# Patient Record
Sex: FEMALE | Race: WHITE | NOT HISPANIC OR LATINO | Employment: PART TIME | URBAN - METROPOLITAN AREA
[De-identification: names, ages, dates, MRNs, and addresses within clinical notes are randomized per-mention and may not be internally consistent; named-entity substitution may affect disease eponyms.]

---

## 2017-02-01 ENCOUNTER — ALLSCRIPTS OFFICE VISIT (OUTPATIENT)
Dept: OTHER | Facility: OTHER | Age: 67
End: 2017-02-01

## 2017-07-11 ENCOUNTER — ALLSCRIPTS OFFICE VISIT (OUTPATIENT)
Dept: OTHER | Facility: OTHER | Age: 67
End: 2017-07-11

## 2017-07-11 DIAGNOSIS — M81.8 OTHER OSTEOPOROSIS WITHOUT CURRENT PATHOLOGICAL FRACTURE: ICD-10-CM

## 2017-07-11 DIAGNOSIS — Z13.820 ENCOUNTER FOR SCREENING FOR OSTEOPOROSIS: ICD-10-CM

## 2017-07-11 DIAGNOSIS — Z12.31 ENCOUNTER FOR SCREENING MAMMOGRAM FOR MALIGNANT NEOPLASM OF BREAST: ICD-10-CM

## 2017-07-12 ENCOUNTER — ALLSCRIPTS OFFICE VISIT (OUTPATIENT)
Dept: OTHER | Facility: OTHER | Age: 67
End: 2017-07-12

## 2017-07-15 LAB
ADDITIONAL INFORMATION (HISTORICAL): NORMAL
ADEQUACY: (HISTORICAL): NORMAL
COMMENT (HISTORICAL): NORMAL
CYTOTECHNOLOGIST: (HISTORICAL): NORMAL
INTERPRETATION (HISTORICAL): NORMAL
LMP (HISTORICAL): NORMAL
PREV. BX: (HISTORICAL): NORMAL
PREV. PAP (HISTORICAL): NORMAL
SOURCE (HISTORICAL): NORMAL

## 2017-07-18 ENCOUNTER — GENERIC CONVERSION - ENCOUNTER (OUTPATIENT)
Dept: OTHER | Facility: OTHER | Age: 67
End: 2017-07-18

## 2017-10-27 ENCOUNTER — ALLSCRIPTS OFFICE VISIT (OUTPATIENT)
Dept: OTHER | Facility: OTHER | Age: 67
End: 2017-10-27

## 2018-01-10 NOTE — MISCELLANEOUS
Message  The patient has called several times complaining of insomnia  Increasing trazodone to 200 mg at bedtime did not help   I instructed her to take 2 mg of lorazepam in addition to 200 mg of trazodone at bedtime until she sees Teresa NUNEZ NP      Signatures   Electronically signed by : Lane Burk MD; Feb 9 2016  6:04PM EST                       (Author)

## 2018-01-11 NOTE — PSYCH
Psych Med Mgmt    Appearance: was calm and cooperative and good eye contact  Observed mood: was dysphoric  Observed mood: affect was flat and affect was constricted  Speech: a normal rate  Thought processes: coherent/organized  Hallucinations: no hallucinations present  Thought Content: no delusions  Abnormal Thoughts: The patient has no suicidal thoughts and no homicidal thoughts  Orientation: The patient is oriented to person, place and time  Recent and Remote Memory: short term memory intact and long term memory intact  Attention Span And Concentration: concentration intact  Insight: Insight intact  Judgment: Her judgment was intact  Treatment Recommendations: Follow up in 3 months  Same meds  The patient has been filling controlled prescriptions on time as prescribed to [facility]       She reports normal appetite, normal energy level and no weight change  Mood is stable  Has settled into her residence much better  More social with the group  Recently had to give her dog up for adoption due to multiple medical issues and Elsa Chaidez could not afford to keep up with the expenses  She's handling the loss fairly well  It certainly has helped that she's become more social at her residence and has made some friends  Continue current meds  Vitals  Signs   Recorded: 69WWI6327 96:74GS   Systolic: 694  Diastolic: 88  Height: 5 ft 5 in  Weight: 170 lb   BMI Calculated: 28 29  BSA Calculated: 1 85    DSM    Provisional Diagnosis: Bipolar Depression no Psychosis  GAF--40  Assessment    1  Bipolar affective disorder, currently depressed, mild (296 51) (F31 31)    Plan    1  ARIPiprazole 5 MG Oral Tablet (Abilify); TAKE 1 TABLET DAILY   2  QUEtiapine Fumarate 100 MG Oral Tablet (SEROquel); take 2 tablets by mouth   at bedtime    Active Problems    1  Bipolar affective disorder, currently depressed, mild (296 51) (F31 31)   2  Cervical cancer screening (V76 2) (Z12 4)   3  Colon cancer screening (V76 51) (Z12 11)   4  Dietary calcium deficiency (269 3) (E58)   5  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 (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 09) (M81 8)   6  Encounter for gynecological examination without abnormal finding (V72 31) (Z01 419)   7  Encounter for screening for osteoporosis (V82 81) (Z13 820)   8  Inadequate exercise (V69 0) (Z72 3)   9  Osteoporosis in other diseases classified elsewhere (733 09) (M81 8)   10  Sebaceous cyst (706 2) (L72 3)   11  Visit for screening mammogram (V76 12) (Z12 31)    Past Medical History    1  Denied: History of abnormal cervical Pap smear   2  History of arthritis (V13 4) (Z87 39)   3  History of gastroesophageal reflux (GERD) (V12 79) (Z87 19)   4  Denied: History of herpes simplex infection   5  History of herpes zoster virus vaccination (V45 89) (Z92 29)   6  History of hypertension (V12 59) (Z86 79)   7  Denied: History of pregnancy   8  History of Insomnia, unspecified type (780 52) (G47 00)   9  History of Irritable bowel syndrome, unspecified type (564 1) (K58 9)   10  History of Migraine with aura and without status migrainosus, not intractable (346 00)    (G43 109)   11  History of Therapeutic opioid-induced constipation (OIC) (564 09,E935 2)    (K59 03,T40 2X5A)   12  History of Varicella without complication (951 0) (R12 8)    Allergies    1  Morphine Derivatives   2  Skelaxin TABS    Current Meds   1  ARIPiprazole 5 MG Oral Tablet; TAKE 1 TABLET DAILY; Therapy: 98MTF9188 to (Evaluate:03Oct2017)  Requested for: 87EQO3779; Last   Rx:05Vzd9317 Ordered   2  Azelastine HCl - 0 1 % Nasal Solution; Therapy: 53IBE9164 to Recorded   3  BuPROPion HCl ER (XL) 300 MG Oral Tablet Extended Release 24 Hour; Take 1 tablet   daily; Therapy: 66AGR5846 to (Evaluate:43Dfu8494)  Requested for: 48QSD4301; Last   Rx:38Phw5022 Ordered   4  Linzess 145 MCG Oral Capsule; Therapy: 24XZD8737 to Recorded   5   LORazepam 2 MG Oral Tablet; TAKE 1 TABLET 3 times daily; Therapy: 93IFQ1814 to (Evaluate:02May2017); Last Rx:52Emv1109 Ordered   6  Losartan Potassium 50 MG Oral Tablet; Therapy: 18VNK0335 to Recorded   7  Omeprazole 20 MG Oral Capsule Delayed Release; Therapy: 12Kgp7030 to Recorded   8  QUEtiapine Fumarate 100 MG Oral Tablet; take 2 tablets by mouth at bedtime; Therapy: 29ALA4435 to (Evaluate:34Yzp9183)  Requested for: 43EQN8148; Last   KX:06PCR5665 Ordered   9  SLPG Compound Medication; oxycodone 10/325 1 po daily; Therapy: (Recorded:66Fls2592) to Recorded    Family Psych History  Mother    1  Family history of breast cancer (V16 3) (Z80 3)  Father    2  Family history of liver disease (V18 59) (Z83 79)  Sister    3  Family history of depression (V17 0) (Z81 8)  Maternal Cousin    4  Family history of ovarian cancer (V16 41) (Z80 41)    Social History    · Alcohol use (V49 89) (Z78 9)   · Current every day smoker (305 1) (F17 200)   · Disabled   · Denied: History of Drug use   · Inadequate exercise (V69 0) (Z72 3)   · Occupation   · [employer]   · Single    End of Encounter Meds    1  ARIPiprazole 5 MG Oral Tablet (Abilify); TAKE 1 TABLET DAILY; Therapy: 68QXC4954 to (Evaluate:10Oct2017)  Requested for: 76Qon9646; Last   Rx:87Ftd0566 Ordered   2  BuPROPion HCl ER (XL) 300 MG Oral Tablet Extended Release 24 Hour (Wellbutrin XL); Take 1 tablet daily; Therapy: 12DBU7394 to (Evaluate:09Jul2017)  Requested for: 28GZU0171; Last   Rx:10May2017 Ordered   3  LORazepam 2 MG Oral Tablet; TAKE 1 TABLET 3 times daily; Therapy: 61DTF1411 to (Evaluate:02May2017); Last Rx:01Feb2017 Ordered   4  QUEtiapine Fumarate 100 MG Oral Tablet (SEROquel); take 2 tablets by mouth at   bedtime; Therapy: 85IEG6595 to (Evaluate:10Oct2017)  Requested for: 30Lcq6114; Last   Rx:10Jbk7575 Ordered    5  Azelastine HCl - 0 1 % Nasal Solution; Therapy: 11AUW1404 to Recorded   6  Linzess 145 MCG Oral Capsule;    Therapy: 56TVP1189 to Recorded   7  Losartan Potassium 50 MG Oral Tablet; Therapy: 98HGL9623 to Recorded   8  Omeprazole 20 MG Oral Capsule Delayed Release; Therapy: 73Wkp2351 to Recorded   9  SLPG Compound Medication; oxycodone 10/325 1 po daily;    Therapy: (Recorded:10Xqm9540) to Recorded    Signatures   Electronically signed by : Tray Swan; Jul 12 2017  3:32PM EST                       (Author)    Electronically signed by : Charles Mazariegos MD; Jul 12 2017  4:56PM EST

## 2018-01-12 NOTE — PSYCH
Psych Med Mgmt    Appearance: was calm and cooperative, demonstrated behavior psychomotor retardation and good eye contact  Observed mood: depressed and anxious  Observed mood: affect was constricted  Speech: a normal rate  Thought processes: flight of ideas  Hallucinations: no hallucinations present  Thought Content: no delusions  Abnormal Thoughts: The patient has no suicidal thoughts and no homicidal thoughts  Orientation: The patient is oriented to person, place and time  Recent and Remote Memory: short term memory intact and long term memory intact  Attention Span And Concentration: concentration impaired  Insight: Limited insight  Judgment: Her judgment was intact  Treatment Recommendations: Follow up in 4 weeks  Trial Abilify  The patient has been filling controlled prescriptions on time as prescribed to [facility]       She reports normal appetite, normal energy level and no weight change  Still experiencing mood fluctuations and anger over finances  Fights with sister frequently over $ from her trust fund  Discussed options, such as part-time employment  She will consider  No suicidal ideation  Vitals  Signs   Recorded: 56TYP9467 33:01WX   Systolic: 962  Diastolic: 80  Height: 5 ft 5 in  Weight: 160 lb   BMI Calculated: 26 63  BSA Calculated: 1 8    DSM    Provisional Diagnosis: Bipolar Depression    GAF--40  Assessment    1  Bipolar affective disorder, currently depressed, mild (296 51) (F31 31)    Plan    1  Doxepin HCl - 50 MG Oral Capsule   2  RisperiDONE 0 5 MG Oral Tablet (RisperDAL)   3  ARIPiprazole 5 MG Oral Tablet (Abilify); TAKE 1 TABLET DAILY   4  Zolpidem Tartrate ER 12 5 MG Oral Tablet Extended Release; TAKE 1 TABLET AT   BEDTIME   5  BuPROPion HCl ER (SR) 150 MG Oral Tablet Extended Release 12 Hour   (Wellbutrin SR); Take 1 tablet twice daily   6   LORazepam 2 MG Oral Tablet; TAKE 1 TABLET 3 times daily    Active Problems    1  Bipolar affective disorder, currently depressed, mild (296 51) (F31 31)    Past Medical History    1  History of depression (V11 8) (Z86 59)   2  History of gastroesophageal reflux (GERD) (V12 79) (Z87 19)   3  History of hypertension (V12 59) (Z86 79)   4  History of migraine (V12 49) (Z86 69)    Allergies    1  Morphine Derivatives   2  Skelaxin TABS    Current Meds   1  BuPROPion HCl ER (SR) 150 MG Oral Tablet Extended Release 12 Hour; Take 1 tablet   twice daily; Therapy: 11OUX9132 to (Evaluate:65Bhv9786)  Requested for: 21Bfa0639; Last   Rx:45Cfq2350 Ordered   2  Doxepin HCl - 50 MG Oral Capsule; TAKE 1 CAPSULE Bedtime May take 1-2 capsules   HS; Therapy: 22PMS0506 to (Evaluate:38Orp9687)  Requested for: 85MYJ2242; Last   Rx:16Nov2016 Ordered   3  LORazepam 2 MG Oral Tablet; TAKE 1 TABLET 3 times daily; Therapy: 83IRH6621 to (Evaluate:63Pbx9698); Last Rx:09Ziq2506 Ordered   4  RisperiDONE 0 5 MG Oral Tablet; Take 1 tablet daily; Therapy: 31DOT0213 to (Evaluate:27Nov2016); Last Rx:28Xez2232 Ordered    Family Psych History  Mother    1  Family history of malignant neoplasm of breast (V16 3) (Z80 3)    Social History    · Current every day smoker (305 1) (F17 200)   · Disabled   · Single    End of Encounter Meds    1  ARIPiprazole 5 MG Oral Tablet (Abilify); TAKE 1 TABLET DAILY; Therapy: 35XCG6659 to (Evaluate:88Kqz7828)  Requested for: 14GOM1147; Last   Rx:30Nov2016 Ordered   2  BuPROPion HCl ER (SR) 150 MG Oral Tablet Extended Release 12 Hour (Wellbutrin   SR); Take 1 tablet twice daily; Therapy: 64MUH3352 to (Melinda Blase)  Requested for: 16JQZ9348; Last   Rx:30Nov2016 Ordered   3  LORazepam 2 MG Oral Tablet; TAKE 1 TABLET 3 times daily; Therapy: 07AOI0956 to (Evaluate:23Amk3472); Last Rx:30Nov2016 Ordered   4  Zolpidem Tartrate ER 12 5 MG Oral Tablet Extended Release; TAKE 1 TABLET AT   BEDTIME; Therapy: 51IYK7743 to (Evaluate:82Kni8753);  Last Rx:71Wiy6581 Ordered    Signatures   Electronically signed by : Svitlana Scott; Nov 30 2016 10:33AM EST                       (Author)    Electronically signed by : Jaspreet Rashid MD; Nov 30 2016  4:56PM EST

## 2018-01-12 NOTE — PSYCH
Psych Med Mgmt    Appearance: was calm and cooperative and good eye contact  Observed mood: mood appropriate  Treatment Recommendations: Follow up in 3 months  Seroquel 100-200mg HS  The patient has been filling controlled prescriptions on time as prescribed to [facility]       She reports normal appetite, normal energy level and no weight change  Still not sleeping well  Mood is "about the same" Reports 3-4 hours of sleep at night  Years ago , had very good sleep, now not so much    Suggested Melatonin  Also, will trial Seroquel HS to help with sleep  Also asked her to discuss a sleep study with her PCP  Vitals  Signs   Recorded: 09BSL6735 36:52YJ   Systolic: 016  Diastolic: 60  Height: 5 ft 5 in  Weight: 155 lb   BMI Calculated: 25 79  BSA Calculated: 1 78    DSM    Provisional Diagnosis: Bipolar Depression no Psychosis    GAF--40  Assessment    1  Bipolar affective disorder, currently depressed, mild (296 51) (F31 31)    Plan    1  BuPROPion HCl ER (SR) 150 MG Oral Tablet Extended Release 12 Hour   (Wellbutrin SR)   2  Zolpidem Tartrate ER 12 5 MG Oral Tablet Extended Release   3  BuPROPion HCl ER (XL) 300 MG Oral Tablet Extended Release 24 Hour   (Wellbutrin XL); Take 1 tablet daily   4  QUEtiapine Fumarate 100 MG Oral Tablet (SEROquel); TAKE 2 TABLET Bedtime   5  ARIPiprazole 5 MG Oral Tablet (Abilify); TAKE 1 TABLET DAILY   6  LORazepam 2 MG Oral Tablet; TAKE 1 TABLET 3 times daily    Active Problems    1  Bipolar affective disorder, currently depressed, mild (296 51) (F31 31)   2  Sebaceous cyst (706 2) (L72 3)    Past Medical History    1  History of depression (V11 8) (Z86 59)   2  History of gastroesophageal reflux (GERD) (V12 79) (Z87 19)   3  History of hypertension (V12 59) (Z86 79)   4  History of migraine (V12 49) (Z86 69)    Allergies    1  Morphine Derivatives   2  Skelaxin TABS    Current Meds   1   ARIPiprazole 5 MG Oral Tablet; TAKE 1 TABLET DAILY; Therapy: 04DWW4755 to (Evaluate:28Feb2017)  Requested for: 06RTB4064; Last   Rx:30Nov2016 Ordered   2  BuPROPion HCl ER (SR) 150 MG Oral Tablet Extended Release 12 Hour; Take 1 tablet   twice daily; Therapy: 16SWS6475 to (Andriette Race)  Requested for: 38RQR5946; Last   Rx:30Nov2016 Ordered   3  LORazepam 2 MG Oral Tablet; TAKE 1 TABLET 3 times daily; Therapy: 39PFS6870 to (Evaluate:74Bzg4522); Last Rx:30Nov2016 Ordered   4  Zolpidem Tartrate ER 12 5 MG Oral Tablet Extended Release; TAKE 1 TABLET AT   BEDTIME; Therapy: 58YOX6668 to (Evaluate:28Feb2017); Last Rx:30Nov2016 Ordered    Family Psych History  Mother    1  Family history of malignant neoplasm of breast (V16 3) (Z80 3)  Father    2  Family history of liver disease (V18 59) (Z83 79)    Social History    · Current every day smoker (305 1) (F17 200)   · Disabled   · Single    End of Encounter Meds    1  ARIPiprazole 5 MG Oral Tablet (Abilify); TAKE 1 TABLET DAILY; Therapy: 05NJO0544 to (Robb Hsu)  Requested for: 22SUO2958; Last   Rx:01Feb2017; Status: ACTIVE - Transmit to Pharmacy - Awaiting Verification Ordered   2  BuPROPion HCl ER (XL) 300 MG Oral Tablet Extended Release 24 Hour (Wellbutrin XL); Take 1 tablet daily; Therapy: 54GDN1850 to (Robb Hsu)  Requested for: 32AOF8092; Last   Rx:01Feb2017; Status: ACTIVE - Transmit to Pharmacy - Awaiting Verification Ordered   3  LORazepam 2 MG Oral Tablet; TAKE 1 TABLET 3 times daily; Therapy: 81GYC7518 to (Evaluate:70Nci7171); Last Rx:01Feb2017 Ordered   4  QUEtiapine Fumarate 100 MG Oral Tablet (SEROquel); TAKE 2 TABLET Bedtime;    Therapy: 46LUI3481 to (Robb Hsu)  Requested for: 51TJZ9916; Last   Rx:01Feb2017; Status: ACTIVE - Transmit to [facility] Verification Ordered    Signatures   Electronically signed by : Jorge Lopez; Feb  1 2017  2:37PM EST                       (Author)    Electronically signed by : Tanvir Harrington Kevin Leary MD; Feb 1 2017  5:07PM EST

## 2018-01-12 NOTE — PSYCH
Assessment    1  Bipolar affective disorder, currently depressed, mild (296 51) (F31 31)    Plan    1  ARIPiprazole 5 MG Oral Tablet (Abilify); TAKE 1 TABLET DAILY   2  BuPROPion HCl ER (XL) 300 MG Oral Tablet Extended Release 24 Hour   (Wellbutrin XL); Take 1 tablet daily   3  LORazepam 2 MG Oral Tablet; TAKE 0 5 TABLET 3 TIMES DAILY   4  TraZODone HCl - 100 MG Oral Tablet; TAKE 1 TABLET AT BEDTIME    Chief Complaint  Referred by Dr Isabelle Diaz  Here to evaluate current meds  History of Present Illness  60year old female here for med evaluation  Referred by Dr Isabelle Diaz  Has been diagnosed with Bipolar Disorder, but does not agree with diagnosis  Believes she is depressed not saldivar  Reports episodes of depression, feels sad and cries at times  No suicidal thoughts  Has been on multple psych meds and may need some adjustments  Has some mild paranoia  Lives in [address] fixed income apartment home in [facility]  Has been living here for one year  was living in a townhouse for 4 years but lost the home secondary to spending all her $ which was her inheritence  Now she has a rep payee  No suicidal thoughts  Past Psychiatric History    Past Psychiatric History: Never hospitalized psychiatrically  Has seen about 4-5 psychiatrists in past     Current meds:  Wellbutrin XL 450mg Daily  Ativan 2mg Daily  Pamelor 50mg PM  Ambien CR12 5mg Daily  Risperdal 2mg HS  Substance Abuse Hx    Substance Abuse History: None  Past Medical History    1  History of depression (V11 8) (Z86 59)   2  History of gastroesophageal reflux (GERD) (V12 79) (Z87 19)   3  History of hypertension (V12 59) (Z86 79)   4  History of migraine (V12 49) (Z86 69)    Allergies    1  Morphine Derivatives   2  Skelaxin TABS    Family Psych History    1   Family history of malignant neoplasm of breast (V16 3) (Z80 3)    Social History    · Current every day smoker (305 1) (F17 200)   · Disabled   · Single    History Of Phys/Sex Abuse Or Perpetration    History Of Phys/Sex Abuse or Perpetration: None  Vitals  Signs [Data Includes: Current Encounter]   Recorded: 70LPB6981 00:94LV   Systolic: 859  Diastolic: 80  Height: 5 ft 5 in  Weight: 178 lb   BMI Calculated: 29 62  BSA Calculated: 1 88    Physical Exam    Treatment Recommendations: Follow up in 5-6 weeks  DSM    Provisional Diagnosis: Bipolar Depression with Psychosis    GAF--40  End of Encounter Meds    1  ARIPiprazole 5 MG Oral Tablet (Abilify); TAKE 1 TABLET DAILY; Therapy: 61PJX4295 to (Aguila Mayo)  Requested for: 61DTA5588; Last   Rx:29Jan2016 Ordered   2  BuPROPion HCl ER (XL) 300 MG Oral Tablet Extended Release 24 Hour (Wellbutrin XL); Take 1 tablet daily; Therapy: 98KHH0219 to (Aguila Mayo)  Requested for: 26KVA5969; Last   Rx:29Jan2016 Ordered   3  LORazepam 2 MG Oral Tablet; TAKE 0 5 TABLET 3 TIMES DAILY; Therapy: 98ZDG3275 to (Evaluate:28Apr2016); Last Rx:29Jan2016 Ordered   4  TraZODone HCl - 100 MG Oral Tablet; TAKE 1 TABLET AT BEDTIME;    Therapy: 07HGK4639 to (Aguila Mayo)  Requested for: 12WGR8821; Last   MJ:59QOI0999 Ordered    Signatures   Electronically signed by : Cruz Miller; Jan 29 2016  3:28PM EST                       (Author)    Electronically signed by : Cruz Miller; Jan 29 2016  3:46PM EST                       (Author)    Electronically signed by : Elissa Brothers MD; Feb 1 2016  3:33PM EST

## 2018-01-13 VITALS
HEIGHT: 65 IN | BODY MASS INDEX: 28.32 KG/M2 | SYSTOLIC BLOOD PRESSURE: 142 MMHG | WEIGHT: 170 LBS | DIASTOLIC BLOOD PRESSURE: 90 MMHG

## 2018-01-14 VITALS
DIASTOLIC BLOOD PRESSURE: 60 MMHG | BODY MASS INDEX: 25.83 KG/M2 | WEIGHT: 155 LBS | HEIGHT: 65 IN | SYSTOLIC BLOOD PRESSURE: 120 MMHG

## 2018-01-14 VITALS
DIASTOLIC BLOOD PRESSURE: 88 MMHG | BODY MASS INDEX: 28.32 KG/M2 | WEIGHT: 170 LBS | SYSTOLIC BLOOD PRESSURE: 140 MMHG | HEIGHT: 65 IN

## 2018-01-15 NOTE — RESULT NOTES
Verified Results  (Q) THINPREP TIS PAP RFX HPV 47JSL9154 12:00AM Apryl Mayen     Test Name Result Flag Reference   CLINICAL INFORMATION:      NO H/O ABN PAPA 72 Y/O G0   LMP:      NONE GIVEN   PREV  PAP:      NONE GIVEN   PREV  BX:      NONE GIVEN   SOURCE:      Cervix, Endocervix   STATEMENT OF ADEQUACY:      Satisfactory for evaluation  Endocervical/transformation zone component  present  INTERPRETATION/RESULT:      Negative for intraepithelial lesion or malignancy  COMMENT:      This Pap test has been evaluated with computer  assisted technology     CYTOTECHNOLOGIST:      OLEG LEROY(ASCP)  CT screening location: [facility], [address] [place] [address]

## 2018-01-15 NOTE — MISCELLANEOUS
Message   Date: 21 Dec 2016 3:59 PM EST, Recorded By: Gracie Cosme For: [facility],Team   Called pt with results from mammogram done on 10/13/16  Negative results, f/u mammo in 1 yr  Pt already has mammo scheduled  Active Problems    1  Bipolar affective disorder, currently depressed, mild (296 51) (F31 31)   2  Sebaceous cyst (706 2) (L72 3)    Current Meds   1  ARIPiprazole 5 MG Oral Tablet (Abilify); TAKE 1 TABLET DAILY; Therapy: 39WOK2547 to (Evaluate:79Xcu5869)  Requested for: 01ILZ0779; Last   Rx:30Nov2016 Ordered   2  BuPROPion HCl ER (SR) 150 MG Oral Tablet Extended Release 12 Hour (Wellbutrin   SR); Take 1 tablet twice daily; Therapy: 32ESM2569 to (Janet Namira)  Requested for: 65YOB0639; Last   Rx:30Nov2016 Ordered   3  LORazepam 2 MG Oral Tablet; TAKE 1 TABLET 3 times daily; Therapy: 07OYI9640 to (Evaluate:45Mfu7766); Last Rx:30Nov2016 Ordered   4  Zolpidem Tartrate ER 12 5 MG Oral Tablet Extended Release; TAKE 1 TABLET AT   BEDTIME; Therapy: 17JFK4947 to (Evaluate:15Igs9996); Last Rx:30Nov2016 Ordered    Allergies    1  Morphine Derivatives   2   Skelaxin TABS    Signatures   Electronically signed by : Lashanda Restrepo, ; Dec 21 2016  4:01PM EST                       (Author)

## 2018-01-16 NOTE — PSYCH
Psych Med Mgmt    Appearance: was calm and cooperative and good eye contact  Observed mood: was dysphoric  Observed mood: affect was blunted  Speech: slowed  Thought processes: circumstantial and poverty of thought was observed  Hallucinations: no hallucinations present  Thought Content: no delusions  Abnormal Thoughts: The patient has no suicidal thoughts and no homicidal thoughts  Orientation: The patient is oriented to person, place and time  Recent and Remote Memory: short term memory intact and long term memory intact  Attention Span And Concentration: concentration impaired  Insight: Insight intact  Judgment: Her judgment was intact  Treatment Recommendations: Follow up in 8 weeks  She reports normal appetite, normal energy level and no weight change  Mood is very subdued  Lethargic at times  Certainly not as grandiose as she has been on initial visit  She also is complaining of some stomach distress, will adjust meds and monitor  No suicidal ideation  No psychosis  Vitals  Signs [Data Includes: Current Encounter]   Recorded: 17NXH0050 59:69SE   Systolic: 367  Diastolic: 80  Height: 5 ft 5 in  Weight: 160 lb   BMI Calculated: 26 63  BSA Calculated: 1 8    DSM    Provisional Diagnosis: Bipolar Disorder with Psychosis  GAF--40  Assessment    1  Bipolar affective disorder, currently depressed, mild (296 51) (F31 31)    Plan    1  ARIPiprazole 5 MG Oral Tablet (Abilify)   2  BuPROPion HCl ER (XL) 300 MG Oral Tablet Extended Release 24 Hour   (Wellbutrin XL)   3  ARIPiprazole 2 MG Oral Tablet (Abilify); TAKE 1 TABLET DAILY   4  BuPROPion HCl ER (SR) 150 MG Oral Tablet Extended Release 12 Hour   (Wellbutrin SR); Take 1 tablet twice daily   5  LORazepam 2 MG Oral Tablet; TAKE 0 5 TABLET 3 TIMES DAILY   6  TraZODone HCl - 50 MG Oral Tablet; TAKE 4 TABLET Bedtime    Active Problems    1   Bipolar affective disorder, currently depressed, mild (296 51) (F31 31)    Past Medical History    1  History of depression (V11 8) (Z86 59)   2  History of gastroesophageal reflux (GERD) (V12 79) (Z87 19)   3  History of hypertension (V12 59) (Z86 79)   4  History of migraine (V12 49) (Z86 69)    Allergies    1  Morphine Derivatives   2  Skelaxin TABS    Current Meds   1  ARIPiprazole 5 MG Oral Tablet; TAKE 1 TABLET DAILY; Therapy: 76QPW9633 to (Lowell Aponte)  Requested for: 63BBK7344; Last   Rx:29Jan2016 Ordered   2  BuPROPion HCl ER (XL) 300 MG Oral Tablet Extended Release 24 Hour; Take 1 tablet   daily; Therapy: 07EIH4710 to (Lowell Aponte)  Requested for: 66JMI0499; Last   Rx:29Jan2016 Ordered   3  LORazepam 2 MG Oral Tablet; TAKE 0 5 TABLET 3 TIMES DAILY; Therapy: 16WAP9599 to (Evaluate:28Apr2016); Last Rx:29Jan2016 Ordered   4  TraZODone HCl - 100 MG Oral Tablet; TAKE 1 TABLET AT BEDTIME; Therapy: 66ODQ7275 to (Lowell Aponte)  Requested for: 79QTK2208; Last   Rx:29Jan2016 Ordered    Family Psych History    1  Family history of malignant neoplasm of breast (V16 3) (Z80 3)    Social History    · Current every day smoker (305 1) (F17 200)   · Disabled   · Single    End of Encounter Meds    1  ARIPiprazole 2 MG Oral Tablet (Abilify); TAKE 1 TABLET DAILY; Therapy: 03WDQ3902 to (Evaluate:23Jun2016)  Requested for: 25Mar2016; Last   Rx:25Mar2016 Ordered   2  BuPROPion HCl ER (SR) 150 MG Oral Tablet Extended Release 12 Hour (Wellbutrin   SR); Take 1 tablet twice daily; Therapy: 46VQP4104 to (Evaluate:23Jun2016)  Requested for: 25Mar2016; Last   Rx:25Mar2016 Ordered   3  LORazepam 2 MG Oral Tablet; TAKE 0 5 TABLET 3 TIMES DAILY; Therapy: 64OOO5441 to (Evaluate:23Jun2016); Last Rx:25Mar2016 Ordered   4  TraZODone HCl - 50 MG Oral Tablet; TAKE 4 TABLET Bedtime;    Therapy: 57ZGS7997 to (Evaluate:23Jun2016)  Requested for: 51OPP1483; Last   Rx:25Mar2016 Ordered    Signatures   Electronically signed by : Cortez Hanley; Mar 25 2016  4:05PM EST (Author)    Electronically signed by : Marcelo Garcia MD; Mar 28 2016  4:51PM EST

## 2018-01-17 NOTE — PSYCH
Psych Med Mgmt    Appearance: was calm and cooperative and good eye contact  Observed mood: was dysphoric and anxious  Observed mood: affect was blunted  Speech: a normal rate  Thought processes: tangential  and circumstantial    Hallucinations: no hallucinations present  Thought Content: no delusions  Abnormal Thoughts: The patient has no suicidal thoughts and no homicidal thoughts  Orientation: The patient is oriented to person, place and time  Recent and Remote Memory: short term memory intact and long term memory intact  Attention Span And Concentration: concentration impaired  Insight: Limited insight  Judgment: Her judgment was limited  Treatment Recommendations: Follow up in 8-10 weeks    Will increase Abilify  She reports normal appetite, normal energy level and no weight change  Mood is labile, irritable  Fouund out her trust allottment will be decrease due to "not much money left"  This has her upset  Discussed ways to cope with less $ and still be able to enjoy things  Will also increase the Abilify to decrease her mood dysfunction  No suicidal thoughts  Vitals  Signs [Data Includes: Current Encounter]   Recorded: 41AXR8518 10:38AR   Systolic: 796  Diastolic: 80  Height: 5 ft 5 in  Weight: 160 lb   BMI Calculated: 26 63  BSA Calculated: 1 8    DSM    Provisional Diagnosis: Bipolar Depression    GAF--40  Assessment    1  Bipolar affective disorder, currently depressed, mild (296 51) (F31 31)    Plan    1  ARIPiprazole 2 MG Oral Tablet (Abilify)   2  ARIPiprazole 5 MG Oral Tablet (Abilify); TAKE 1 TABLET DAILY   3  From  LORazepam 2 MG Oral Tablet TAKE 0 5 TABLET 3 TIMES DAILY To   LORazepam 2 MG Oral Tablet TAKE 1 TABLET 3 times daily   4  BuPROPion HCl ER (SR) 150 MG Oral Tablet Extended Release 12 Hour   (Wellbutrin SR); Take 1 tablet twice daily   5  TraZODone HCl - 50 MG Oral Tablet; TAKE 4 TABLET Bedtime    Active Problems    1   Bipolar affective disorder, currently depressed, mild (296 51) (F31 31)    Past Medical History    1  History of depression (V11 8) (Z86 59)   2  History of gastroesophageal reflux (GERD) (V12 79) (Z87 19)   3  History of hypertension (V12 59) (Z86 79)   4  History of migraine (V12 49) (Z86 69)    Allergies    1  Morphine Derivatives   2  Skelaxin TABS    Current Meds   1  ARIPiprazole 2 MG Oral Tablet; TAKE 1 TABLET DAILY; Therapy: 09ECZ0702 to (Evaluate:23Jun2016)  Requested for: 25Mar2016; Last   Rx:25Mar2016 Ordered   2  BuPROPion HCl ER (SR) 150 MG Oral Tablet Extended Release 12 Hour; Take 1 tablet   twice daily; Therapy: 18GXE1581 to (Evaluate:23Jun2016)  Requested for: 25Mar2016; Last   Rx:25Mar2016 Ordered   3  LORazepam 2 MG Oral Tablet; TAKE 0 5 TABLET 3 TIMES DAILY; Therapy: 80SBS7557 to (Evaluate:23Jun2016); Last Rx:25Mar2016 Ordered   4  TraZODone HCl - 50 MG Oral Tablet; TAKE 4 TABLET Bedtime; Therapy: 47TXD2687 to (Evaluate:23Jun2016)  Requested for: 25Mar2016; Last   Rx:25Mar2016 Ordered    Family Psych History  Mother    1  Family history of malignant neoplasm of breast (V16 3) (Z80 3)    Social History    · Current every day smoker (305 1) (F17 200)   · Disabled   · Single    End of Encounter Meds    1  ARIPiprazole 5 MG Oral Tablet (Abilify); TAKE 1 TABLET DAILY; Therapy: 33JNS1708 to (Evaluate:04Poz7672)  Requested for: 87HFN6198; Last   Rx:32Akd0827 Ordered   2  BuPROPion HCl ER (SR) 150 MG Oral Tablet Extended Release 12 Hour (Wellbutrin   SR); Take 1 tablet twice daily; Therapy: 68BQK1094 to (Evaluate:91Jip0578)  Requested for: 88CGC2289; Last   Rx:43Xie7115 Ordered   3  LORazepam 2 MG Oral Tablet; TAKE 1 TABLET 3 times daily; Therapy: 74CRQ5423 to (Evaluate:49Gsa8486); Last NJ:08QLA5098 Ordered   4  TraZODone HCl - 50 MG Oral Tablet; TAKE 4 TABLET Bedtime;    Therapy: 43CEW9090 to (Evaluate:40Bms5336)  Requested for: 78DLJ1507; Last   FQ:14SRM8854 Ordered    Signatures   Electronically signed by MARSHAL Sanders; May 20 2016  3:23PM EST                       (Author)    Electronically signed by : Mayra Delaney MD; May 31 2016  3:34PM EST

## 2018-01-17 NOTE — PSYCH
Psych Med Mgmt    Appearance: was calm and cooperative and good eye contact  Observed mood: mood appropriate  Observed mood: affect was constricted, but affect appropriate  Speech: a normal rate  Thought processes: coherent/organized  Hallucinations: no hallucinations present  Thought Content: no delusions  Abnormal Thoughts: The patient has no suicidal thoughts and no homicidal thoughts  Orientation: The patient is oriented to person, place and time  Recent and Remote Memory: short term memory intact and long term memory intact  Attention Span And Concentration: concentration intact  Insight: Insight intact  Judgment: Her judgment was intact  Treatment Recommendations: Follow up in 6 months  Same meds  The patient has been filling controlled prescriptions on time as prescribed to [facility]       She reports normal appetite, normal energy level and no weight change  Mood is better  Getting along well with people in her place of residence  This has helped her to become more social, more engaging with others  Meds working well to keep symptoms under control  Denies anxiety or depression  Continue current treatment  Vitals  Signs   Recorded: 43EZB6244 57:69ZM   Systolic: 800  Diastolic: 86  Height: 5 ft 5 in  Weight: 170 lb   BMI Calculated: 28 29  BSA Calculated: 1 85    DSM    Provisional Diagnosis: Bipolar Depression no Psychosis  GAF--40    Assessment    1  Bipolar affective disorder, currently depressed, mild (296 51) (F31 31)    Plan    1  ARIPiprazole 5 MG Oral Tablet (Abilify); TAKE 1 TABLET DAILY   2  BuPROPion HCl ER (XL) 300 MG Oral Tablet Extended Release 24 Hour   (Wellbutrin XL); Take 1 tablet daily   3  LORazepam 2 MG Oral Tablet; TAKE 1 TABLET 3 times daily   4  QUEtiapine Fumarate 100 MG Oral Tablet (SEROquel); take 2 tablets by mouth   at bedtime    Active Problems    1   Bipolar affective disorder, currently depressed, mild (296 51) (F31 31)   2  Cervical cancer screening (V76 2) (Z12 4)   3  Colon cancer screening (V76 51) (Z12 11)   4  Dietary calcium deficiency (269 3) (E58)   5  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 (733 09,E947 9)   (M81 8,T50 905A)   6  Encounter for gynecological examination without abnormal finding (V72 31) (Z01 419)   7  Encounter for screening for osteoporosis (V82 81) (Z13 820)   8  Inadequate exercise (V69 0) (Z72 3)   9  Osteoporosis in other diseases classified elsewhere (733 09) (M81 8)   10  Sebaceous cyst (706 2) (L72 3)   11  Visit for screening mammogram (V76 12) (Z12 31)    Past Medical History    1  Denied: History of abnormal cervical Pap smear   2  History of arthritis (V13 4) (Z87 39)   3  History of gastroesophageal reflux (GERD) (V12 79) (Z87 19)   4  Denied: History of herpes simplex infection   5  History of herpes zoster virus vaccination (V45 89) (Z92 29)   6  History of hypertension (V12 59) (Z86 79)   7  Denied: History of pregnancy   8  History of Insomnia, unspecified type (780 52) (G47 00)   9  History of Irritable bowel syndrome, unspecified type (564 1) (K58 9)   10  History of Migraine with aura and without status migrainosus, not intractable (346 00)    (G43 109)   11  History of Therapeutic opioid-induced constipation (OIC) (564 09,E935 2)    (K59 03,T40 2X5A)   12  History of Varicella without complication (898 5) (Z62 8)    Allergies    1  Morphine Derivatives   2  Skelaxin TABS    Current Meds   1  ARIPiprazole 5 MG Oral Tablet; TAKE 1 TABLET DAILY; Therapy: 01NYH2761 to (Evaluate:10Oct2017)  Requested for: 48Qbi6454; Last   Rx:85Gic4450 Ordered   2  Azelastine HCl - 0 1 % Nasal Solution; Therapy: 13GYW9931 to Recorded   3  BuPROPion HCl ER (XL) 300 MG Oral Tablet Extended Release 24 Hour; Take 1 tablet   daily; Therapy: 27ZVQ1057 to (Evaluate:33Qcz6930)  Requested for: 99Yti9731; Last   Rx:86Ftg7612 Ordered   4   Linzess 145 MCG Oral Capsule; Therapy: 65GFE1361 to Recorded   5  LORazepam 2 MG Oral Tablet; TAKE 1 TABLET 3 times daily; Therapy: 00SEP5472 to (Evaluate:32Xuo9178); Last Rx:24Kah4903 Ordered   6  Losartan Potassium 50 MG Oral Tablet; Therapy: 99JRR8615 to Recorded   7  Omeprazole 20 MG Oral Capsule Delayed Release; Therapy: 04Fbo2366 to Recorded   8  QUEtiapine Fumarate 100 MG Oral Tablet; take 2 tablets by mouth at bedtime; Therapy: 24BHX3286 to (Evaluate:05Eth8478)  Requested for: 09Aug2017; Last   Rx:26Dzb3455 Ordered   9  SLPG Compound Medication; oxycodone 10/325 1 po daily; Therapy: (Recorded:37Ehq6651) to Recorded    Family Psych History  Mother    1  Family history of breast cancer (V16 3) (Z80 3)  Father    2  Family history of liver disease (V18 59) (Z83 79)  Sister    3  Family history of depression (V17 0) (Z81 8)  Maternal Cousin    4  Family history of ovarian cancer (V16 41) (Z80 41)    Social History    · Alcohol use (V49 89) (Z78 9)   · Current every day smoker (305 1) (F17 200)   · Disabled   · Denied: History of Drug use   · Inadequate exercise (V69 0) (Z72 3)   · Occupation   · [employer]   · Single    End of Encounter Meds    1  ARIPiprazole 5 MG Oral Tablet (Abilify); TAKE 1 TABLET DAILY; Therapy: 13MPL3286 to (Evaluate:25Apr2018)  Requested for: 27Oct2017; Last   Rx:27Oct2017 Ordered   2  BuPROPion HCl ER (XL) 300 MG Oral Tablet Extended Release 24 Hour (Wellbutrin XL); Take 1 tablet daily; Therapy: 22FFM5228 to (Evaluate:46Kth2923)  Requested for: 27Oct2017; Last   Rx:27Oct2017 Ordered   3  LORazepam 2 MG Oral Tablet; TAKE 1 TABLET 3 times daily; Therapy: 23XLH8062 to 96 072624); Last Rx:27Oct2017 Ordered   4  QUEtiapine Fumarate 100 MG Oral Tablet (SEROquel); take 2 tablets by mouth at   bedtime; Therapy: 36KJU2361 to (Evaluate:28Ogf8438)  Requested for: 27Oct2017; Last   Rx:27Oct2017 Ordered    5  Azelastine HCl - 0 1 % Nasal Solution;    Therapy: 32NTD6271 to Recorded   6  Linzess 145 MCG Oral Capsule; Therapy: 78OFY5825 to Recorded   7  Losartan Potassium 50 MG Oral Tablet; Therapy: 85JJT6003 to Recorded   8  Omeprazole 20 MG Oral Capsule Delayed Release; Therapy: 81Zfx3916 to Recorded   9  SLPG Compound Medication; oxycodone 10/325 1 po daily;    Therapy: (Recorded:41Drr6820) to Recorded    Signatures   Electronically signed by : Eliud Bird; Oct 27 2017  1:51PM EST                       (Author)    Electronically signed by : Neva Johnson MD; Oct 27 2017  2:14PM EST

## 2018-01-17 NOTE — PSYCH
Psych Med Mgmt    Appearance: was calm and cooperative and good eye contact  Observed mood: was dysphoric and anxious  Observed mood: affect was flat  Speech: a normal rate  Thought processes: coherent/organized  Hallucinations: no hallucinations present  Thought Content: no delusions  Abnormal Thoughts: The patient has no suicidal thoughts and no homicidal thoughts  Orientation: The patient is oriented to person, place and time  Recent and Remote Memory: short term memory intact and long term memory intact  Attention Span And Concentration: concentration intact  Insight: Limited insight  Judgment: Her judgment was limited  Treatment Recommendations: Folow up in 3 months  D/C Abilify secondary to cost issues  Trial Risperdal 2 mg HS  She reports normal appetite, normal energy level and no weight change  Recently had an incident at [facility] where she did not have enough o pay for groceries  Other customers helped her out, and she was able to get her groceries  She did manage her emotions well through this incident  Less labile,less irritable  Still with financial issues  Easily overwhelmed  No suicidal ideation  Worried about $  Wants Abilify changed to a more cost effective med  History of being on Risperdal, will retry  Vitals  Signs   Recorded: 83WFA2576 39:03DS   Systolic: 287  Diastolic: 80  Height: 5 ft 5 in  Weight: 160 lb   BMI Calculated: 26 63  BSA Calculated: 1 8    DSM    Provisional Diagnosis: Bipolar Depression-Recurrent    GAF--40  Assessment    1  Bipolar affective disorder, currently depressed, mild (296 51) (F31 31)    Plan    1  RisperiDONE 2 MG Oral Tablet (RisperDAL); TAKE 1 TABLET AT BEDTIME   2  BuPROPion HCl ER (SR) 150 MG Oral Tablet Extended Release 12 Hour   (Wellbutrin SR); Take 1 tablet twice daily   3  LORazepam 2 MG Oral Tablet; TAKE 1 TABLET 3 times daily   4   TraZODone HCl - 50 MG Oral Tablet; TAKE 4 TABLET Bedtime    Active Problems    1  Bipolar affective disorder, currently depressed, mild (296 51) (F31 31)    Past Medical History    1  History of depression (V11 8) (Z86 59)   2  History of gastroesophageal reflux (GERD) (V12 79) (Z87 19)   3  History of hypertension (V12 59) (Z86 79)   4  History of migraine (V12 49) (Z86 69)    Allergies    1  Morphine Derivatives   2  Skelaxin TABS    Current Meds   1  BuPROPion HCl ER (SR) 150 MG Oral Tablet Extended Release 12 Hour; Take 1 tablet   twice daily; Therapy: 86PQE5059 to (Evaluate:80Ogq7239)  Requested for: 21XMP9178; Last   Rx:93Qkq9302 Ordered   2  LORazepam 2 MG Oral Tablet; TAKE 1 TABLET 3 times daily; Therapy: 56FFY1822 to (Evaluate:86Zll8194); Last DP:41KGJ4422 Ordered   3  TraZODone HCl - 50 MG Oral Tablet; TAKE 4 TABLET Bedtime; Therapy: 17SLL4792 to (Evaluate:35Bnv6094)  Requested for: 73DMO2797; Last   Rx:11Ama1606 Ordered    Family Psych History  Mother    1  Family history of malignant neoplasm of breast (V16 3) (Z80 3)    Social History    · Current every day smoker (305 1) (F17 200)   · Disabled   · Single    End of Encounter Meds    1  BuPROPion HCl ER (SR) 150 MG Oral Tablet Extended Release 12 Hour (Wellbutrin   SR); Take 1 tablet twice daily; Therapy: 64OLN6904 to (Evaluate:79Vfy6835)  Requested for: 90Akv5464; Last   Rx:52Zoc2917 Ordered   2  LORazepam 2 MG Oral Tablet; TAKE 1 TABLET 3 times daily; Therapy: 71BSA2653 to (Evaluate:31Nwa2241); Last Rx:24Nxb8042 Ordered   3  RisperiDONE 2 MG Oral Tablet (RisperDAL); TAKE 1 TABLET AT BEDTIME; Therapy: 06Sos3726 to (Bharat Chime)  Requested for: 86Jvk9432; Last   Rx:67Grp8930 Ordered   4  TraZODone HCl - 50 MG Oral Tablet; TAKE 4 TABLET Bedtime;    Therapy: 72YDV0918 to (Evaluate:34Bqc2529)  Requested for: 50Yfu5630; Last   II:71TAX6298 Ordered    Signatures   Electronically signed by : Ronit Hernandez; Sep  7 2016  3:25PM EST                       (Author)    Electronically signed by : Phan Jarquin Kwaku Connors MD; Sep  7 2016  4:53PM EST

## 2018-01-22 VITALS
BODY MASS INDEX: 28.32 KG/M2 | DIASTOLIC BLOOD PRESSURE: 86 MMHG | HEIGHT: 65 IN | SYSTOLIC BLOOD PRESSURE: 140 MMHG | WEIGHT: 170 LBS

## 2018-03-23 ENCOUNTER — TELEPHONE (OUTPATIENT)
Dept: PSYCHIATRY | Facility: CLINIC | Age: 68
End: 2018-03-23

## 2018-04-15 DIAGNOSIS — F33.0 MILD EPISODE OF RECURRENT MAJOR DEPRESSIVE DISORDER (HCC): Primary | ICD-10-CM

## 2018-04-16 ENCOUNTER — TELEPHONE (OUTPATIENT)
Dept: PSYCHIATRY | Facility: CLINIC | Age: 68
End: 2018-04-16

## 2018-04-18 RX ORDER — BUPROPION HYDROCHLORIDE 300 MG/1
TABLET ORAL
Qty: 30 TABLET | Refills: 2 | Status: SHIPPED | OUTPATIENT
Start: 2018-04-18 | End: 2018-04-25 | Stop reason: ALTCHOICE

## 2018-04-25 ENCOUNTER — OFFICE VISIT (OUTPATIENT)
Dept: PSYCHIATRY | Facility: CLINIC | Age: 68
End: 2018-04-25
Payer: MEDICARE

## 2018-04-25 DIAGNOSIS — F31.77 BIPOLAR DISORDER, IN PARTIAL REMISSION, MOST RECENT EPISODE MIXED (HCC): ICD-10-CM

## 2018-04-25 DIAGNOSIS — F33.0 MILD EPISODE OF RECURRENT MAJOR DEPRESSIVE DISORDER (HCC): Primary | ICD-10-CM

## 2018-04-25 PROBLEM — F33.41 RECURRENT MAJOR DEPRESSIVE DISORDER, IN PARTIAL REMISSION (HCC): Status: ACTIVE | Noted: 2018-04-25

## 2018-04-25 PROCEDURE — 99213 OFFICE O/P EST LOW 20 MIN: CPT | Performed by: PSYCHIATRY & NEUROLOGY

## 2018-04-25 RX ORDER — QUETIAPINE FUMARATE 100 MG/1
2 TABLET, FILM COATED ORAL
COMMUNITY
Start: 2017-02-01 | End: 2018-07-25 | Stop reason: SDUPTHER

## 2018-04-25 RX ORDER — ARIPIPRAZOLE 5 MG/1
1 TABLET ORAL DAILY
COMMUNITY
Start: 2016-11-30 | End: 2018-04-25 | Stop reason: SDUPTHER

## 2018-04-25 RX ORDER — ARIPIPRAZOLE 5 MG/1
5 TABLET ORAL DAILY
Qty: 30 TABLET | Refills: 5 | Status: SHIPPED | OUTPATIENT
Start: 2018-04-25 | End: 2018-07-25 | Stop reason: SDUPTHER

## 2018-04-25 RX ORDER — BUPROPION HYDROCHLORIDE 300 MG/1
150 TABLET ORAL DAILY
COMMUNITY
Start: 2018-03-16 | End: 2018-04-25 | Stop reason: SDUPTHER

## 2018-04-25 RX ORDER — LORAZEPAM 2 MG/1
2 TABLET ORAL 3 TIMES DAILY
Qty: 90 TABLET | Refills: 2 | Status: SHIPPED | OUTPATIENT
Start: 2018-04-25 | End: 2018-07-25 | Stop reason: SDUPTHER

## 2018-04-25 RX ORDER — LORAZEPAM 2 MG/1
1 TABLET ORAL 3 TIMES DAILY
COMMUNITY
Start: 2016-01-29 | End: 2018-04-25 | Stop reason: SDUPTHER

## 2018-04-25 RX ORDER — BUPROPION HYDROCHLORIDE 300 MG/1
300 TABLET ORAL DAILY
Qty: 30 TABLET | Refills: 5 | Status: SHIPPED | OUTPATIENT
Start: 2018-04-25 | End: 2018-07-25 | Stop reason: SDUPTHER

## 2018-04-25 NOTE — PSYCH
PROGRESS NOTE        [facility]      Name and Date of Birth:  Adan Bauman 78 y o  1950    Date of Visit: 04/25/18    SUBJECTIVE:    Kendra De La Torre continues to feel better since the last visit  She denies significant depressive symptoms, continues to do well  She denies suicidal ideation, intent or plan at present, has no suicidal ideation, intent or plan at present  She denies any auditory hallucinations and visual hallucinations, denies any other delusional thinking, denies any delusional thinking  She denies any side effects from medications    HPI ROS Appetite Changes and Sleep: normal appetite, normal sleep    Review Of Systems:      Constitutional Negative   ENT Negative   Cardiovascular Negative   Respiratory As Noted in HPI   Gastrointestinal Negative   Genitourinary Negative   Musculoskeletal Negative   Integumentary Negative   Neurological Negative   Endocrine Negative   Other Symptoms Negative and None       Laboratory Results: No results found for this or any previous visit  Substance Abuse History:    History   Drug Use No       Family Psychiatric History:     No family history on file  The following portions of the patient's history were reviewed and updated as appropriate: past family history, past medical history, past social history, past surgical history and problem list     Social History     Social History    Marital status: Single     Spouse name: N/A    Number of children: N/A    Years of education: N/A     Occupational History    Not on file       Social History Main Topics    Smoking status: Current Every Day Smoker     Types: Cigarettes    Smokeless tobacco: Never Used    Alcohol use No    Drug use: No    Sexual activity: Not on file     Other Topics Concern    Not on file     Social History Narrative    No narrative on file     Social History     Social History Narrative    No narrative on file Social History     Tobacco History     Smoking Status  Current Every Day Smoker Smoking Tobacco Type  Cigarettes    Smokeless Tobacco Use  Never Used          Alcohol History     Alcohol Use Status  No          Drug Use     Drug Use Status  No          Sexual Activity     Sexually Active  Not Asked          Activities of Daily Living    Not Asked                     OBJECTIVE:     Mental Status Evaluation:    Appearance age appropriate, casually dressed   Behavior pleasant, cooperative   Speech normal volume, normal pitch   Mood improved   Affect brighter   Thought Processes logical   Associations intact associations   Thought Content normal   Perceptual Disturbances: none   Abnormal Thoughts  Risk Potential Suicidal ideation - None  Homicidal ideation - None  Potential for aggression - Yes   Orientation oriented to person, place, time/date and situation   Memory recent and remote memory grossly intact   Cosciousness alert and awake   Attention Span attention span and concentration are age appropriate   Intellect Appears to be of Average Intelligence   Insight age appropriate and improved   Judgement good and improved   Muscle Strength and  Gait muscle strength and tone were normal   Language no difficulty naming common objects   Fund of Knowledge displays adequate knowledge of current events   Pain none   Pain Scale 2       Assessment/Plan:       Diagnoses and all orders for this visit:    Mild episode of recurrent major depressive disorder (HCC)  -     ARIPiprazole (ABILIFY) 5 mg tablet; Take 1 tablet (5 mg total) by mouth daily  -     buPROPion (WELLBUTRIN XL) 300 mg 24 hr tablet; Take 1 tablet (300 mg total) by mouth daily  -     LORazepam (ATIVAN) 2 mg tablet; Take 1 tablet (2 mg total) by mouth 3 (three) times a day    Bipolar disorder, in partial remission, most recent episode mixed (HCC)    Other orders  -     Discontinue: ARIPiprazole (ABILIFY) 5 mg tablet;  Take 1 tablet by mouth daily  - Discontinue: LORazepam (ATIVAN) 2 mg tablet; Take 1 tablet by mouth 3 (three) times a day  -     QUEtiapine (SEROquel) 100 mg tablet; Take 2 tablets by mouth  -     Discontinue: buPROPion (WELLBUTRIN XL) 300 mg 24 hr tablet; Take 150 mg by mouth daily          Treatment Recommendations/Precautions:    Continue current medications:    Risks/Benefits      Risks, Benefits And Possible Side Effects Of Medications:    Risks, benefits, and possible side effects of medications explained to patient and patient verbalizes understanding and agreement for treatment      Controlled Medication Discussion:     Not applicable    Psychotherapy Provided:     Individual psychotherapy provided: No

## 2018-07-25 ENCOUNTER — OFFICE VISIT (OUTPATIENT)
Dept: PSYCHIATRY | Facility: CLINIC | Age: 68
End: 2018-07-25
Payer: MEDICARE

## 2018-07-25 DIAGNOSIS — F31.32 BIPOLAR 1 DISORDER, DEPRESSED, MODERATE (HCC): Primary | ICD-10-CM

## 2018-07-25 DIAGNOSIS — F33.0 MILD EPISODE OF RECURRENT MAJOR DEPRESSIVE DISORDER (HCC): ICD-10-CM

## 2018-07-25 PROCEDURE — 99213 OFFICE O/P EST LOW 20 MIN: CPT | Performed by: NURSE PRACTITIONER

## 2018-07-25 RX ORDER — BUPROPION HYDROCHLORIDE 300 MG/1
300 TABLET ORAL DAILY
Qty: 90 TABLET | Refills: 1 | Status: SHIPPED | OUTPATIENT
Start: 2018-07-25 | End: 2018-11-28 | Stop reason: SDUPTHER

## 2018-07-25 RX ORDER — QUETIAPINE FUMARATE 100 MG/1
200 TABLET, FILM COATED ORAL
Qty: 60 TABLET | Refills: 2 | Status: SHIPPED | OUTPATIENT
Start: 2018-07-25 | End: 2019-03-12 | Stop reason: ALTCHOICE

## 2018-07-25 RX ORDER — ARIPIPRAZOLE 5 MG/1
5 TABLET ORAL DAILY
Qty: 30 TABLET | Refills: 2 | Status: SHIPPED | OUTPATIENT
Start: 2018-07-25 | End: 2019-01-10 | Stop reason: ALTCHOICE

## 2018-07-25 RX ORDER — LORAZEPAM 2 MG/1
2 TABLET ORAL 3 TIMES DAILY
Qty: 270 TABLET | Refills: 1 | Status: SHIPPED | OUTPATIENT
Start: 2018-07-25 | End: 2019-01-10 | Stop reason: DRUGHIGH

## 2018-07-25 NOTE — PSYCH
PROGRESS NOTE        [facility]      Name and Date of Birth:  Teresa Mccray 78 y o  1950    Date of Visit: 07/25/18    SUBJECTIVE:    Katerin Valadez continues to feel better since the last visit  She denies significant depressive symptoms, continues to do well  No new clinical concerns or issues expressed  She denies suicidal ideation, intent or plan at present, has no suicidal ideation, intent or plan at present  She denies any auditory hallucinations and visual hallucinations, denies any other delusional thinking, denies any delusional thinking  She denies any side effects from medications    HPI ROS Appetite Changes and Sleep: normal appetite, normal sleep    Review Of Systems:      Constitutional Negative   ENT Negative   Cardiovascular Negative   Respiratory As Noted in HPI   Gastrointestinal Negative   Genitourinary Negative   Musculoskeletal Negative   Integumentary Negative   Neurological Negative   Endocrine Negative   Other Symptoms Negative and None       Laboratory Results: No results found for this or any previous visit  Substance Abuse History:    History   Drug Use No       Family Psychiatric History:     No family history on file  The following portions of the patient's history were reviewed and updated as appropriate: past family history, past medical history, past social history, past surgical history and problem list     Social History     Social History    Marital status: Single     Spouse name: N/A    Number of children: N/A    Years of education: N/A     Occupational History    Not on file       Social History Main Topics    Smoking status: Current Every Day Smoker     Types: Cigarettes    Smokeless tobacco: Never Used    Alcohol use No    Drug use: No    Sexual activity: Not on file     Other Topics Concern    Not on file     Social History Narrative    No narrative on file     Social History     Social History Narrative    No narrative on file        Social History     Tobacco History     Smoking Status  Current Every Day Smoker Smoking Tobacco Type  Cigarettes    Smokeless Tobacco Use  Never Used          Alcohol History     Alcohol Use Status  No          Drug Use     Drug Use Status  No          Sexual Activity     Sexually Active  Not Asked          Activities of Daily Living    Not Asked                     OBJECTIVE:     Mental Status Evaluation:    Appearance age appropriate, casually dressed   Behavior pleasant, cooperative   Speech normal volume, normal pitch   Mood improved   Affect brighter   Thought Processes logical   Associations intact associations   Thought Content normal   Perceptual Disturbances: none   Abnormal Thoughts  Risk Potential Suicidal ideation - None  Homicidal ideation - None  Potential for aggression - Yes   Orientation oriented to person, place, time/date and situation   Memory recent and remote memory grossly intact   Cosciousness alert and awake   Attention Span attention span and concentration are age appropriate   Intellect Appears to be of Average Intelligence   Insight age appropriate and improved   Judgement good and improved   Muscle Strength and  Gait muscle strength and tone were normal   Language no difficulty naming common objects   Fund of Knowledge displays adequate knowledge of current events   Pain none   Pain Scale 2       Assessment/Plan:       Diagnoses and all orders for this visit:    Bipolar 1 disorder, depressed, moderate (HCC)  -     QUEtiapine (SEROquel) 100 mg tablet; Take 2 tablets (200 mg total) by mouth daily at bedtime    Mild episode of recurrent major depressive disorder (HCC)  -     buPROPion (WELLBUTRIN XL) 300 mg 24 hr tablet; Take 1 tablet (300 mg total) by mouth daily  -     LORazepam (ATIVAN) 2 mg tablet; Take 1 tablet (2 mg total) by mouth 3 (three) times a day  -     ARIPiprazole (ABILIFY) 5 mg tablet;  Take 1 tablet (5 mg total) by mouth daily Treatment Recommendations/Precautions:    Continue current medications:    Risks/Benefits      Risks, Benefits And Possible Side Effects Of Medications:    Risks, benefits, and possible side effects of medications explained to patient and patient verbalizes understanding and agreement for treatment  Controlled Medication Discussion: Discussed use of Benzodiazepines and Opiates for pain use  She does not take Benzo within 4 hours of taking Oxycontin for pain         Psychotherapy Provided:     Individual psychotherapy provided: No

## 2018-11-28 ENCOUNTER — TELEPHONE (OUTPATIENT)
Dept: PSYCHIATRY | Facility: CLINIC | Age: 68
End: 2018-11-28

## 2018-11-28 DIAGNOSIS — F33.0 MILD EPISODE OF RECURRENT MAJOR DEPRESSIVE DISORDER (HCC): ICD-10-CM

## 2018-11-28 RX ORDER — BUPROPION HYDROCHLORIDE 300 MG/1
300 TABLET ORAL DAILY
Qty: 90 TABLET | Refills: 2 | Status: SHIPPED | OUTPATIENT
Start: 2018-11-28 | End: 2019-01-10 | Stop reason: SDUPTHER

## 2018-12-07 ENCOUNTER — TELEPHONE (OUTPATIENT)
Dept: BEHAVIORAL/MENTAL HEALTH CLINIC | Facility: CLINIC | Age: 68
End: 2018-12-07

## 2019-01-08 ENCOUNTER — TELEPHONE (OUTPATIENT)
Dept: PSYCHIATRY | Facility: CLINIC | Age: 69
End: 2019-01-08

## 2019-01-10 ENCOUNTER — OFFICE VISIT (OUTPATIENT)
Dept: PSYCHIATRY | Facility: CLINIC | Age: 69
End: 2019-01-10
Payer: MEDICARE

## 2019-01-10 ENCOUNTER — TELEPHONE (OUTPATIENT)
Dept: PSYCHIATRY | Facility: CLINIC | Age: 69
End: 2019-01-10

## 2019-01-10 DIAGNOSIS — F33.0 MILD EPISODE OF RECURRENT MAJOR DEPRESSIVE DISORDER (HCC): ICD-10-CM

## 2019-01-10 DIAGNOSIS — F33.1 MODERATE EPISODE OF RECURRENT MAJOR DEPRESSIVE DISORDER (HCC): Primary | ICD-10-CM

## 2019-01-10 PROBLEM — F32.A ANXIETY AND DEPRESSION: Status: ACTIVE | Noted: 2019-01-10

## 2019-01-10 PROBLEM — F41.9 ANXIETY AND DEPRESSION: Status: ACTIVE | Noted: 2019-01-10

## 2019-01-10 PROCEDURE — 99214 OFFICE O/P EST MOD 30 MIN: CPT | Performed by: NURSE PRACTITIONER

## 2019-01-10 RX ORDER — LORAZEPAM 2 MG/1
2 TABLET ORAL DAILY PRN
Qty: 30 TABLET | Refills: 0 | OUTPATIENT
Start: 2019-01-10 | End: 2019-10-02 | Stop reason: SDUPTHER

## 2019-01-10 RX ORDER — BUPROPION HYDROCHLORIDE 300 MG/1
300 TABLET ORAL DAILY
Qty: 90 TABLET | Refills: 2 | Status: SHIPPED | OUTPATIENT
Start: 2019-01-10 | End: 2019-04-09 | Stop reason: ALTCHOICE

## 2019-01-10 NOTE — PSYCH
PROGRESS NOTE        [address] [facility]      Name and Date of Birth:  Shan Bauman 80 y o  1950    Date of Visit: 01/10/19    SUBJECTIVE:    Mela Richey feels anxious and upset regarding her finances  She tells me that her trust fund is running low and she is in need of an extra 250 dollars per month for her expenses  I have advised her to contact [facility] to see if there any she could tap into to help her situation because she feels at this point, she cannot work even on a minimal per mallika level  Today, we did reduce some of her medications since she is not taking it the way she had in the past   She is not been taking the Abilify mood more often than once a week so we discontinued  Lorazepam I have reduced to 2 mg per day because she does take oxycodone  I am also going to contact her pain management doctor, Dr Evgeny Osorio since office which she gave me permission to do regarding the oxycodone he prescribes for her and the fact that I am prescribing the lorazepam   So for now, will continue with Wellbutrin  mg daily and Seroquel 200 mg at bedtime  The Ativan has been reduced to a total of 2 mg per day and she has been strongly advised to not take it within 2-4 hours of the time she is taking the oxycodone  She will follow-up with us in 6 months or sooner if necessary  She denies suicidal ideation, intent or plan at present, has no suicidal ideation, intent or plan at present  She denies any auditory hallucinations and visual hallucinations, denies any other delusional thinking, denies any delusional thinking  She denies any side effects from medications      HPI ROS Appetite Changes and Sleep: normal appetite, normal sleep    Review Of Systems:      Constitutional Negative   ENT Negative   Cardiovascular Negative   Respiratory As Noted in HPI   Gastrointestinal Negative   Genitourinary Negative   Musculoskeletal Negative Integumentary Negative   Neurological Negative   Endocrine Negative   Other Symptoms Negative and None       Laboratory Results: No results found for this or any previous visit  Substance Abuse History:    History   Drug Use No       Family Psychiatric History:     No family history on file  The following portions of the patient's history were reviewed and updated as appropriate: past family history, past medical history, past social history, past surgical history and problem list     Social History     Social History    Marital status: Single     Spouse name: N/A    Number of children: N/A    Years of education: N/A     Occupational History    Not on file       Social History Main Topics    Smoking status: Current Every Day Smoker     Types: Cigarettes    Smokeless tobacco: Never Used    Alcohol use No    Drug use: No    Sexual activity: Not on file     Other Topics Concern    Not on file     Social History Narrative    No narrative on file     Social History     Social History Narrative    No narrative on file        Social History     Tobacco History     Smoking Status  Current Every Day Smoker Smoking Tobacco Type  Cigarettes    Smokeless Tobacco Use  Never Used          Alcohol History     Alcohol Use Status  No          Drug Use     Drug Use Status  No          Sexual Activity     Sexually Active  Not Asked          Activities of Daily Living    Not Asked                     OBJECTIVE:     Mental Status Evaluation:    Appearance age appropriate, casually dressed   Behavior pleasant, cooperative   Speech normal volume, normal pitch   Mood Anxious   Affect Blunted   Thought Processes logical   Associations intact associations   Thought Content normal   Perceptual Disturbances: none   Abnormal Thoughts  Risk Potential Suicidal ideation - None  Homicidal ideation - None  Potential for aggression -No   Orientation oriented to person, place, time/date and situation   Memory recent and remote memory grossly intact   Cosciousness alert and awake   Attention Span attention span and concentration are age appropriate   Intellect Appears to be of Average Intelligence   Insight age appropriate and improved   Judgement good and improved   Muscle Strength and  Gait muscle strength and tone were normal   Language no difficulty naming common objects   Fund of Knowledge displays adequate knowledge of current events   Pain none   Pain Scale 2       Assessment/Plan:       Diagnoses and all orders for this visit:    Moderate episode of recurrent major depressive disorder (HCC)  -     LORazepam (ATIVAN) 2 mg tablet; Take 1 tablet (2 mg total) by mouth daily as needed for anxiety    Mild episode of recurrent major depressive disorder (HCC)  -     buPROPion (WELLBUTRIN XL) 300 mg 24 hr tablet; Take 1 tablet (300 mg total) by mouth daily          Treatment Recommendations/Precautions:    Continue current medications:  Reduce and titrated down on lorazepam  Contact her pain management doctor, Dr Elva Lopez and discussed with him the use of oxycodone and to make him aware that the patient is also taking the Razadyne him  Wellbutrin  mg daily  Seroquel 200 mg at bedtime  Follow-up in 6 months or sooner if necessary  Risks/Benefits      Risks, Benefits And Possible Side Effects Of Medications:    Risks, benefits, and possible side effects of medications explained to patient and patient verbalizes understanding and agreement for treatment      Controlled Medication Discussion:     Not applicable    Psychotherapy Provided:     Individual psychotherapy provided: No

## 2019-02-05 ENCOUNTER — TELEPHONE (OUTPATIENT)
Dept: PSYCHIATRY | Facility: CLINIC | Age: 69
End: 2019-02-05

## 2019-03-06 ENCOUNTER — TELEPHONE (OUTPATIENT)
Dept: PSYCHIATRY | Facility: CLINIC | Age: 69
End: 2019-03-06

## 2019-03-12 ENCOUNTER — OFFICE VISIT (OUTPATIENT)
Dept: PSYCHIATRY | Facility: CLINIC | Age: 69
End: 2019-03-12
Payer: MEDICARE

## 2019-03-12 DIAGNOSIS — F33.1 MAJOR DEPRESSIVE DISORDER, RECURRENT, MODERATE (HCC): Primary | ICD-10-CM

## 2019-03-12 PROCEDURE — 99214 OFFICE O/P EST MOD 30 MIN: CPT | Performed by: NURSE PRACTITIONER

## 2019-03-12 RX ORDER — TRAZODONE HYDROCHLORIDE 100 MG/1
TABLET ORAL
Qty: 60 TABLET | Refills: 2 | Status: SHIPPED | OUTPATIENT
Start: 2019-03-12 | End: 2019-04-03 | Stop reason: SDUPTHER

## 2019-03-12 NOTE — PSYCH
PROGRESS NOTE        [facility]      Name and Date of Birth:  Brody Bauman 80 y o  1950    Date of Visit: 03/12/19    SUBJECTIVE:    Jena Villar returns today with a primary complaint of insomnia  She said the Seroquel was working fairly well for her but unfortunately, it did cause her some mild akathisia  She discontinued it  She has chronic problems with sleep so I did suggest to her that she contact her PCP and discussed with him the possibility of a sleep study  She will do that  For S today, I did add trazodone 100-200 mg at bedtime and I have asked her again to trial melatonin at a dose of 6 mg at bedtime  She assures me that she will  She still takes the lorazepam during the day although I told her may be more effective to split the dose half in the morning and half at night  She is no longer taking any opiates for pain relief  She will follow-up with us again in 4 weeks  She denies suicidal ideation, intent or plan at present, has no suicidal ideation, intent or plan at present  She denies any auditory hallucinations and visual hallucinations, denies any other delusional thinking, denies any delusional thinking  She denies any side effects from medications    HPI ROS Appetite Changes and Sleep: normal appetite, normal sleep    Review Of Systems:      Constitutional Negative   ENT Negative   Cardiovascular Negative   Respiratory As Noted in HPI   Gastrointestinal Negative   Genitourinary Negative   Musculoskeletal Negative   Integumentary Negative   Neurological Negative   Endocrine Negative   Other Symptoms Negative and None       Laboratory Results: No results found for this or any previous visit  Substance Abuse History:    Social History     Substance and Sexual Activity   Drug Use No       Family Psychiatric History:     No family history on file      The following portions of the patient's history were reviewed and updated as appropriate: past family history, past medical history, past social history, past surgical history and problem list     Social History     Socioeconomic History    Marital status: Single     Spouse name: Not on file    Number of children: Not on file    Years of education: Not on file    Highest education level: Not on file   Occupational History    Not on file   Social Needs    Financial resource strain: Not on file    Food insecurity:     Worry: Not on file     Inability: Not on file    Transportation needs:     Medical: Not on file     Non-medical: Not on file   Tobacco Use    Smoking status: Current Every Day Smoker     Types: Cigarettes    Smokeless tobacco: Never Used   Substance and Sexual Activity    Alcohol use: No    Drug use: No    Sexual activity: Not on file   Lifestyle    Physical activity:     Days per week: Not on file     Minutes per session: Not on file    Stress: Not on file   Relationships    Social connections:     Talks on phone: Not on file     Gets together: Not on file     Attends Hindu service: Not on file     Active member of club or organization: Not on file     Attends meetings of clubs or organizations: Not on file     Relationship status: Not on file    Intimate partner violence:     Fear of current or ex partner: Not on file     Emotionally abused: Not on file     Physically abused: Not on file     Forced sexual activity: Not on file   Other Topics Concern    Not on file   Social History Narrative    Not on file     Social History     Social History Narrative    Not on file        Social History     Tobacco History     Smoking Status  Current Every Day Smoker Smoking Tobacco Type  Cigarettes    Smokeless Tobacco Use  Never Used          Alcohol History     Alcohol Use Status  No          Drug Use     Drug Use Status  No          Sexual Activity     Sexually Active  Not Asked          Activities of Daily Living    Not Asked                     OBJECTIVE: Mental Status Evaluation:    Appearance age appropriate, casually dressed   Behavior pleasant, cooperative   Speech normal volume, normal pitch   Mood Euthymic   Affect Frustrated   Thought Processes Ruminates   Associations intact associations   Thought Content Normal   Perceptual Disturbances: None   Abnormal Thoughts  Risk Potential Suicidal ideation - None  Homicidal ideation - None  Potential for aggression - No   Orientation oriented to person, place, time/date and situation   Memory recent and remote memory grossly intact   Cosciousness alert and awake   Attention Span attention span and concentration are age appropriate   Intellect Appears to be of Average Intelligence   Insight Fair   Judgement Fair   Muscle Strength and  Gait muscle strength and tone were normal   Language no difficulty naming common objects   Fund of Knowledge displays adequate knowledge of current events   Pain none   Pain Scale 2       Assessment/Plan:       Diagnoses and all orders for this visit:    Major depressive disorder, recurrent, moderate (HCC)  -     traZODone (DESYREL) 100 mg tablet; 1-2 HS          Treatment Recommendations/Precautions:    Continue current medications: Wellbutrin  mg daily  Lorazepam 2 mg daily  Add trazodone 100-200 mg HS  Discontinue Seroquel  Discussed with her PCP possibility of a sleep study  All    Risks/Benefits      Risks, Benefits And Possible Side Effects Of Medications:    Risks, benefits, and possible side effects of medications explained to patient and patient verbalizes understanding and agreement for treatment      Controlled Medication Discussion:   No history of overuse/abuse    Psychotherapy Provided:     Individual psychotherapy provided: No

## 2019-04-03 DIAGNOSIS — F33.1 MAJOR DEPRESSIVE DISORDER, RECURRENT, MODERATE (HCC): ICD-10-CM

## 2019-04-03 RX ORDER — TRAZODONE HYDROCHLORIDE 100 MG/1
TABLET ORAL
Qty: 60 TABLET | Refills: 2 | Status: SHIPPED | OUTPATIENT
Start: 2019-04-03 | End: 2019-04-09 | Stop reason: ALTCHOICE

## 2019-04-03 RX ORDER — TRAZODONE HYDROCHLORIDE 100 MG/1
TABLET ORAL
Qty: 60 TABLET | Refills: 0 | OUTPATIENT
Start: 2019-04-03

## 2019-04-08 ENCOUNTER — TELEPHONE (OUTPATIENT)
Dept: PSYCHIATRY | Facility: CLINIC | Age: 69
End: 2019-04-08

## 2019-04-09 ENCOUNTER — OFFICE VISIT (OUTPATIENT)
Dept: PSYCHIATRY | Facility: CLINIC | Age: 69
End: 2019-04-09
Payer: MEDICARE

## 2019-04-09 DIAGNOSIS — F33.1 MAJOR DEPRESSIVE DISORDER, RECURRENT, MODERATE (HCC): Primary | ICD-10-CM

## 2019-04-09 DIAGNOSIS — F33.1 MODERATE EPISODE OF RECURRENT MAJOR DEPRESSIVE DISORDER (HCC): Primary | ICD-10-CM

## 2019-04-09 PROBLEM — F99 INSOMNIA DUE TO OTHER MENTAL DISORDER: Status: ACTIVE | Noted: 2019-04-09

## 2019-04-09 PROBLEM — F51.05 INSOMNIA DUE TO OTHER MENTAL DISORDER: Status: ACTIVE | Noted: 2019-04-09

## 2019-04-09 PROCEDURE — 99214 OFFICE O/P EST MOD 30 MIN: CPT | Performed by: NURSE PRACTITIONER

## 2019-04-09 RX ORDER — ZOLPIDEM TARTRATE 12.5 MG/1
12.5 TABLET, FILM COATED, EXTENDED RELEASE ORAL
Qty: 30 TABLET | Refills: 1 | Status: SHIPPED | OUTPATIENT
Start: 2019-04-09 | End: 2019-04-09

## 2019-04-09 RX ORDER — BUPROPION HYDROCHLORIDE 150 MG/1
TABLET, EXTENDED RELEASE ORAL
Qty: 60 TABLET | Refills: 2 | Status: SHIPPED | OUTPATIENT
Start: 2019-04-09 | End: 2019-07-01 | Stop reason: SDUPTHER

## 2019-04-09 RX ORDER — ZOLPIDEM TARTRATE 10 MG/1
10 TABLET ORAL
Qty: 30 TABLET | Refills: 2 | Status: SHIPPED | OUTPATIENT
Start: 2019-04-09 | End: 2019-05-07 | Stop reason: ALTCHOICE

## 2019-04-11 ENCOUNTER — TELEPHONE (OUTPATIENT)
Dept: PSYCHIATRY | Facility: CLINIC | Age: 69
End: 2019-04-11

## 2019-04-11 DIAGNOSIS — F33.1 MODERATE EPISODE OF RECURRENT MAJOR DEPRESSIVE DISORDER (HCC): Primary | ICD-10-CM

## 2019-04-11 RX ORDER — ESCITALOPRAM OXALATE 5 MG/1
5 TABLET ORAL DAILY
Qty: 30 TABLET | Refills: 2 | Status: SHIPPED | OUTPATIENT
Start: 2019-04-11 | End: 2019-07-02

## 2019-05-07 ENCOUNTER — OFFICE VISIT (OUTPATIENT)
Dept: PSYCHIATRY | Facility: CLINIC | Age: 69
End: 2019-05-07
Payer: MEDICARE

## 2019-05-07 DIAGNOSIS — F33.41 RECURRENT MAJOR DEPRESSIVE DISORDER, IN PARTIAL REMISSION (HCC): Primary | ICD-10-CM

## 2019-05-07 PROBLEM — F51.01 PRIMARY INSOMNIA: Status: ACTIVE | Noted: 2019-05-07

## 2019-05-07 PROCEDURE — 99214 OFFICE O/P EST MOD 30 MIN: CPT | Performed by: NURSE PRACTITIONER

## 2019-05-07 RX ORDER — TRAZODONE HYDROCHLORIDE 50 MG/1
50 TABLET ORAL
Qty: 30 TABLET | Refills: 2 | Status: SHIPPED | OUTPATIENT
Start: 2019-05-07 | End: 2019-06-06

## 2019-05-07 RX ORDER — HYDROXYZINE PAMOATE 50 MG/1
CAPSULE ORAL
Qty: 60 CAPSULE | Refills: 2 | Status: SHIPPED | OUTPATIENT
Start: 2019-05-07 | End: 2019-07-02 | Stop reason: ALTCHOICE

## 2019-05-24 PROCEDURE — 88305 TISSUE EXAM BY PATHOLOGIST: CPT | Performed by: PATHOLOGY

## 2019-05-25 ENCOUNTER — LAB REQUISITION (OUTPATIENT)
Dept: LAB | Facility: HOSPITAL | Age: 69
End: 2019-05-25
Payer: MEDICARE

## 2019-05-25 DIAGNOSIS — D49.2 NEOPLASM OF UNSPECIFIED BEHAVIOR OF BONE, SOFT TISSUE, AND SKIN: ICD-10-CM

## 2019-06-03 ENCOUNTER — TELEPHONE (OUTPATIENT)
Dept: PSYCHIATRY | Facility: CLINIC | Age: 69
End: 2019-06-03

## 2019-06-06 DIAGNOSIS — F33.1 MODERATE EPISODE OF RECURRENT MAJOR DEPRESSIVE DISORDER (HCC): Primary | ICD-10-CM

## 2019-06-06 DIAGNOSIS — F51.05 INSOMNIA DUE TO OTHER MENTAL DISORDER: ICD-10-CM

## 2019-06-06 DIAGNOSIS — F99 INSOMNIA DUE TO OTHER MENTAL DISORDER: ICD-10-CM

## 2019-06-06 RX ORDER — DOXEPIN HYDROCHLORIDE 50 MG/1
50 CAPSULE ORAL
Qty: 30 CAPSULE | Refills: 2 | Status: SHIPPED | OUTPATIENT
Start: 2019-06-06 | End: 2019-07-02

## 2019-06-06 RX ORDER — PHENOL 1.4 %
10 AEROSOL, SPRAY (ML) MUCOUS MEMBRANE
Qty: 30 TABLET | Refills: 2 | Status: SHIPPED | OUTPATIENT
Start: 2019-06-06 | End: 2020-09-25

## 2019-06-10 PROCEDURE — 88304 TISSUE EXAM BY PATHOLOGIST: CPT | Performed by: PATHOLOGY

## 2019-06-12 ENCOUNTER — LAB REQUISITION (OUTPATIENT)
Dept: LAB | Facility: HOSPITAL | Age: 69
End: 2019-06-12
Payer: MEDICARE

## 2019-06-12 DIAGNOSIS — D49.2 NEOPLASM OF UNSPECIFIED BEHAVIOR OF BONE, SOFT TISSUE, AND SKIN: ICD-10-CM

## 2019-06-20 ENCOUNTER — TELEPHONE (OUTPATIENT)
Dept: PSYCHIATRY | Facility: CLINIC | Age: 69
End: 2019-06-20

## 2019-07-01 DIAGNOSIS — F33.1 MODERATE EPISODE OF RECURRENT MAJOR DEPRESSIVE DISORDER (HCC): ICD-10-CM

## 2019-07-01 RX ORDER — BUPROPION HYDROCHLORIDE 150 MG/1
TABLET, EXTENDED RELEASE ORAL
Qty: 180 TABLET | Refills: 0 | Status: SHIPPED | OUTPATIENT
Start: 2019-07-01 | End: 2019-10-02 | Stop reason: DRUGHIGH

## 2019-07-02 ENCOUNTER — OFFICE VISIT (OUTPATIENT)
Dept: PSYCHIATRY | Facility: CLINIC | Age: 69
End: 2019-07-02
Payer: MEDICARE

## 2019-07-02 DIAGNOSIS — F99 INSOMNIA DUE TO OTHER MENTAL DISORDER: ICD-10-CM

## 2019-07-02 DIAGNOSIS — F51.05 INSOMNIA DUE TO OTHER MENTAL DISORDER: ICD-10-CM

## 2019-07-02 DIAGNOSIS — F33.1 MODERATE EPISODE OF RECURRENT MAJOR DEPRESSIVE DISORDER (HCC): ICD-10-CM

## 2019-07-02 PROCEDURE — 99213 OFFICE O/P EST LOW 20 MIN: CPT | Performed by: NURSE PRACTITIONER

## 2019-07-02 RX ORDER — ESCITALOPRAM OXALATE 5 MG/1
5 TABLET ORAL DAILY
Qty: 30 TABLET | Refills: 5 | Status: SHIPPED | OUTPATIENT
Start: 2019-07-02 | End: 2019-07-19 | Stop reason: SDUPTHER

## 2019-07-02 RX ORDER — DOXEPIN HYDROCHLORIDE 50 MG/1
CAPSULE ORAL
Qty: 90 CAPSULE | Refills: 2 | Status: SHIPPED | OUTPATIENT
Start: 2019-07-02 | End: 2019-07-23 | Stop reason: SDUPTHER

## 2019-07-02 NOTE — PSYCH
PROGRESS NOTE        [address] [facility]      Name and Date of Birth:  Mercedez Bauman 80 y o  1950    Date of Visit: 07/02/19    SUBJECTIVE:    Raul Arteaga presents for treatment of mood disorder and major depressive disorder and chronic insomnia  She is sleeping better but she is still not sleeping through the night  I told her she could take a maximum dose of doxepin up to 150 mg at bedtime but no more  She seems to be doing fairly well in taking the 100 mg at bedtime to help with sleep  Otherwise all other medications will remain the same  She is reporting no new depressive symptoms and no new anxiety symptoms  Follow-up will be in 3 months  She denies suicidal ideation, intent or plan at present, has no suicidal ideation, intent or plan at present  She denies any auditory hallucinations and visual hallucinations, denies any other delusional thinking, denies any delusional thinking  She denies any side effects from medications    HPI ROS Appetite Changes and Sleep: normal appetite, normal sleep    Review Of Systems:      Constitutional Negative   ENT Negative   Cardiovascular Negative   Respiratory As Noted in HPI   Gastrointestinal Negative   Genitourinary Negative   Musculoskeletal Negative   Integumentary Negative   Neurological Negative   Endocrine Negative   Other Symptoms Negative and None       Laboratory Results: No results found for this or any previous visit  Substance Abuse History:    Social History     Substance and Sexual Activity   Drug Use No       Family Psychiatric History:     No family history on file      The following portions of the patient's history were reviewed and updated as appropriate: past family history, past medical history, past social history, past surgical history and problem list     Social History     Socioeconomic History    Marital status: Single     Spouse name: Not on file    Number of children: Not on file    Years of education: Not on file    Highest education level: Not on file   Occupational History    Not on file   Social Needs    Financial resource strain: Not on file    Food insecurity:     Worry: Not on file     Inability: Not on file    Transportation needs:     Medical: Not on file     Non-medical: Not on file   Tobacco Use    Smoking status: Current Every Day Smoker     Types: Cigarettes    Smokeless tobacco: Never Used   Substance and Sexual Activity    Alcohol use: No    Drug use: No    Sexual activity: Not on file   Lifestyle    Physical activity:     Days per week: Not on file     Minutes per session: Not on file    Stress: Not on file   Relationships    Social connections:     Talks on phone: Not on file     Gets together: Not on file     Attends Mormonism service: Not on file     Active member of club or organization: Not on file     Attends meetings of clubs or organizations: Not on file     Relationship status: Not on file    Intimate partner violence:     Fear of current or ex partner: Not on file     Emotionally abused: Not on file     Physically abused: Not on file     Forced sexual activity: Not on file   Other Topics Concern    Not on file   Social History Narrative    Not on file     Social History     Social History Narrative    Not on file        Social History     Tobacco History     Smoking Status  Current Every Day Smoker Smoking Tobacco Type  Cigarettes    Smokeless Tobacco Use  Never Used          Alcohol History     Alcohol Use Status  No          Drug Use     Drug Use Status  No          Sexual Activity     Sexually Active  Not Asked          Activities of Daily Living    Not Asked                     OBJECTIVE:     Mental Status Evaluation:    Appearance age appropriate, casually dressed   Behavior pleasant, cooperative   Speech normal volume, normal pitch   Mood Stable   Affect brighter   Thought Processes logical   Associations intact associations   Thought Content Normal   Perceptual Disturbances: None   Abnormal Thoughts  Risk Potential Suicidal ideation - None  Homicidal ideation - None  Potential for aggression - No   Orientation oriented to person, place, time/date and situation   Memory recent and remote memory grossly intact   Cosciousness alert and awake   Attention Span attention span and concentration are age appropriate   Intellect Appears to be of Average Intelligence   Insight Fair   Judgement Fair   Muscle Strength and  Gait muscle strength and tone were normal   Language no difficulty naming common objects   Fund of Knowledge displays adequate knowledge of current events   Pain None   Pain Scale 0       Assessment/Plan:       Diagnoses and all orders for this visit:    Insomnia due to other mental disorder  -     doxepin (SINEquan) 50 mg capsule; 1-3 capsules at HS PRN for sleep    Moderate episode of recurrent major depressive disorder (HCC)  -     escitalopram (LEXAPRO) 5 mg tablet; Take 1 tablet (5 mg total) by mouth daily          Treatment Recommendations/Precautions:    Continue current medications:  Doxepin  mg HS  Lexapro 5 mg daily  Ativan 2 mg HS p r n  Insomnia  Melatonin 10 mg HS  Follow-up in 3 months or sooner if necessary  Risks/Benefits      Risks, Benefits And Possible Side Effects Of Medications:    Risks, benefits, and possible side effects of medications explained to patient and patient verbalizes understanding and agreement for treatment  Controlled Medication Discussion:  Takes all meds as prescribed with no history of overuse or abuse      Not applicable    Psychotherapy Provided:     Individual psychotherapy provided: No

## 2019-07-19 ENCOUNTER — TELEPHONE (OUTPATIENT)
Dept: PSYCHIATRY | Facility: CLINIC | Age: 69
End: 2019-07-19

## 2019-07-19 DIAGNOSIS — F33.1 MODERATE EPISODE OF RECURRENT MAJOR DEPRESSIVE DISORDER (HCC): ICD-10-CM

## 2019-07-19 RX ORDER — ESCITALOPRAM OXALATE 5 MG/1
5 TABLET ORAL DAILY
Qty: 30 TABLET | Refills: 5 | Status: CANCELLED | OUTPATIENT
Start: 2019-07-19

## 2019-07-19 RX ORDER — ESCITALOPRAM OXALATE 5 MG/1
5 TABLET ORAL DAILY
Qty: 30 TABLET | Refills: 5 | Status: SHIPPED | OUTPATIENT
Start: 2019-07-19 | End: 2020-03-24 | Stop reason: SDUPTHER

## 2019-07-19 NOTE — TELEPHONE ENCOUNTER
Pt stating she constantly has dry mouth and she would like to you about it  She states the medication is helping her sleep, but now is experiencing severe dry mouth  Pt can be reached at [phone]

## 2019-07-23 DIAGNOSIS — F51.05 INSOMNIA DUE TO OTHER MENTAL DISORDER: ICD-10-CM

## 2019-07-23 DIAGNOSIS — F99 INSOMNIA DUE TO OTHER MENTAL DISORDER: ICD-10-CM

## 2019-07-23 RX ORDER — DOXEPIN HYDROCHLORIDE 50 MG/1
CAPSULE ORAL
Qty: 90 CAPSULE | Refills: 2 | Status: SHIPPED | OUTPATIENT
Start: 2019-07-23 | End: 2019-10-02 | Stop reason: ALTCHOICE

## 2019-08-20 ENCOUNTER — TELEPHONE (OUTPATIENT)
Dept: PSYCHIATRY | Facility: CLINIC | Age: 69
End: 2019-08-20

## 2019-08-20 NOTE — TELEPHONE ENCOUNTER
Gideon Reddy called for refill on doxepin 50 mg  Gideon Reddy only wants two weeks worth of medication filled        Upcoming appt 10/2/19        [facility] [phone]

## 2019-08-21 NOTE — TELEPHONE ENCOUNTER
Blanka Rene going to contact pharmacy  Pharmacy claimed that 270 pills given when filled on 7/23/2019  Blanka Rene stated that this is not the case   She will call back if she would need anything more from us

## 2019-09-06 PROCEDURE — 88305 TISSUE EXAM BY PATHOLOGIST: CPT | Performed by: PATHOLOGY

## 2019-09-09 ENCOUNTER — LAB REQUISITION (OUTPATIENT)
Dept: LAB | Facility: HOSPITAL | Age: 69
End: 2019-09-09
Payer: MEDICARE

## 2019-09-09 DIAGNOSIS — D49.2 NEOPLASM OF UNSPECIFIED BEHAVIOR OF BONE, SOFT TISSUE, AND SKIN: ICD-10-CM

## 2019-09-17 PROCEDURE — 88305 TISSUE EXAM BY PATHOLOGIST: CPT | Performed by: PATHOLOGY

## 2019-09-18 ENCOUNTER — LAB REQUISITION (OUTPATIENT)
Dept: LAB | Facility: HOSPITAL | Age: 69
End: 2019-09-18
Payer: MEDICARE

## 2019-09-18 DIAGNOSIS — D49.2 NEOPLASM OF UNSPECIFIED BEHAVIOR OF BONE, SOFT TISSUE, AND SKIN: ICD-10-CM

## 2019-10-02 ENCOUNTER — OFFICE VISIT (OUTPATIENT)
Dept: PSYCHIATRY | Facility: CLINIC | Age: 69
End: 2019-10-02
Payer: MEDICARE

## 2019-10-02 DIAGNOSIS — G47.00 INSOMNIA, UNSPECIFIED TYPE: ICD-10-CM

## 2019-10-02 DIAGNOSIS — F33.1 MODERATE EPISODE OF RECURRENT MAJOR DEPRESSIVE DISORDER (HCC): ICD-10-CM

## 2019-10-02 DIAGNOSIS — F33.41 RECURRENT MAJOR DEPRESSIVE DISORDER, IN PARTIAL REMISSION (HCC): Primary | ICD-10-CM

## 2019-10-02 PROCEDURE — 99214 OFFICE O/P EST MOD 30 MIN: CPT | Performed by: NURSE PRACTITIONER

## 2019-10-02 RX ORDER — LORAZEPAM 2 MG/1
2 TABLET ORAL DAILY PRN
Qty: 30 TABLET | Refills: 2 | Status: SHIPPED | OUTPATIENT
Start: 2019-10-02 | End: 2020-04-27

## 2019-10-02 RX ORDER — BUPROPION HYDROCHLORIDE 300 MG/1
300 TABLET ORAL DAILY
Qty: 30 TABLET | Refills: 5 | Status: SHIPPED | OUTPATIENT
Start: 2019-10-02 | End: 2020-03-24 | Stop reason: SDUPTHER

## 2019-10-02 NOTE — PSYCH
PROGRESS NOTE        [facility]      Name and Date of Birth:  Aisha Bauman 80 y o  1950    Date of Visit: 10/02/19    SUBJECTIVE:    Jolyn Galeazzi today for treatment of major depressive disorder, generalized anxiety disorder, mood disorder and chronic insomnia  The patient is requesting to go back on Wellbutrin XL verses the Wellbutrin SR  That was ordered for her today  She is continue with the 300 mg  She said she was having trouble taking the divided dose of the 150s  I told her she could also use the to tabs of the 150s in the morning but she said she felt better with the XL version  So that was ordered  Also, she still complaining of very poor sleep  This is a patient that we have tried multiple and various medications and treatment to help her sleep  Unfortunately, none have really helped  Given this, I have asked her to get in touch with her PCP regarding her continued persistent problems with insomnia  Also, I have provided her with the phone number for our [facility] and Dr Karen Bustamante who is 1 of the providers there  Alvarezia Zheng provided her with a letter today stating her diagnoses and what medication she takes on a regular basis  She tells me today that she is also searching other treatment options to help with her chronic insomnia  We will follow up with her for treatment of her depression and her mood disorder in her anxiety disorder in 3 months  She denies suicidal ideation, intent or plan at present, has no suicidal ideation, intent or plan at present  She denies any auditory hallucinations and visual hallucinations, denies any other delusional thinking, denies any delusional thinking  She denies any side effects from medications      HPI ROS Appetite Changes and Sleep: normal appetite, normal sleep    Review Of Systems:      Constitutional Negative   ENT Negative   Cardiovascular Negative   Respiratory As Noted in HPI   Gastrointestinal Negative   Genitourinary Negative   Musculoskeletal Negative   Integumentary Negative   Neurological Negative   Endocrine Negative   Other Symptoms Negative and None       Laboratory Results: No results found for this or any previous visit  Substance Abuse History:    Social History     Substance and Sexual Activity   Drug Use No       Family Psychiatric History:     No family history on file      The following portions of the patient's history were reviewed and updated as appropriate: past family history, past medical history, past social history, past surgical history and problem list     Social History     Socioeconomic History    Marital status: Single     Spouse name: Not on file    Number of children: Not on file    Years of education: Not on file    Highest education level: Not on file   Occupational History    Not on file   Social Needs    Financial resource strain: Not on file    Food insecurity:     Worry: Not on file     Inability: Not on file    Transportation needs:     Medical: Not on file     Non-medical: Not on file   Tobacco Use    Smoking status: Current Every Day Smoker     Types: Cigarettes    Smokeless tobacco: Never Used   Substance and Sexual Activity    Alcohol use: No    Drug use: No    Sexual activity: Not on file   Lifestyle    Physical activity:     Days per week: Not on file     Minutes per session: Not on file    Stress: Not on file   Relationships    Social connections:     Talks on phone: Not on file     Gets together: Not on file     Attends Taoism service: Not on file     Active member of club or organization: Not on file     Attends meetings of clubs or organizations: Not on file     Relationship status: Not on file    Intimate partner violence:     Fear of current or ex partner: Not on file     Emotionally abused: Not on file     Physically abused: Not on file     Forced sexual activity: Not on file   Other Topics Concern    Not on file   Social History Narrative    Not on file     Social History     Social History Narrative    Not on file        Social History     Tobacco History     Smoking Status  Current Every Day Smoker Smoking Tobacco Type  Cigarettes    Smokeless Tobacco Use  Never Used          Alcohol History     Alcohol Use Status  No          Drug Use     Drug Use Status  No          Sexual Activity     Sexually Active  Not Asked          Activities of Daily Living    Not Asked                     OBJECTIVE:     Mental Status Evaluation:    Appearance age appropriate, casually dressed   Behavior pleasant, cooperative   Speech normal volume, normal pitch   Mood Stable   Affect Constricted   Thought Processes logical   Associations intact associations   Thought Content Normal   Perceptual Disturbances: None   Abnormal Thoughts  Risk Potential Suicidal ideation - None  Homicidal ideation - None  Potential for aggression - No   Orientation oriented to person, place, time/date and situation   Memory recent and remote memory grossly intact   Cosciousness alert and awake   Attention Span attention span and concentration are age appropriate   Intellect Appears to be of Average Intelligence   Insight Fair   Judgement Fair   Muscle Strength and  Gait muscle strength and tone were normal   Language no difficulty naming common objects   Fund of Knowledge displays adequate knowledge of current events   Pain None   Pain Scale 0       Assessment/Plan:       Diagnoses and all orders for this visit:    Recurrent major depressive disorder, in partial remission (HCC)  -     buPROPion (WELLBUTRIN XL) 300 mg 24 hr tablet; Take 1 tablet (300 mg total) by mouth daily    Insomnia, unspecified type    Moderate episode of recurrent major depressive disorder (HCC)  -     LORazepam (ATIVAN) 2 mg tablet;  Take 1 tablet (2 mg total) by mouth daily as needed for anxiety          Treatment Recommendations/Precautions:    Continue current medications:  Lexapro 5 mg daily  Start Wellbutrin  mg daily and discontinue Wellbutrin  mg b i d  Ativan 2 mg HS  Continue with melatonin HS  Follow-up in 3 months  Risks/Benefits      Risks, Benefits And Possible Side Effects Of Medications:    Risks, benefits, and possible side effects of medications explained to patient and patient verbalizes understanding and agreement for treatment      Controlled Medication Discussion:     Not applicable    Psychotherapy Provided:     Individual psychotherapy provided: No

## 2019-10-02 NOTE — LETTER
October 2, 2019     Patient: Rosie Nicolas   YOB: 1950   Date of Visit: 10/2/2019       To Whom it May Concern:    Romy Garsia is under my professional care  She was seen in my office on 10-2-2019  She is being treated for diagnoses of Major Depression, Generalized Anxiety Disorder and Chronic Insomnia  We prescribe: Lexapro 5mg Daily, Wellbutrin XL 300mg Daily, Ativan 2mg HS and she takes Melatonin 10mg  Nightly  If you have any questions or concerns, please don't hesitate to call  Sincerely,          Elveria Osgood, CRNP        CC: Angelina Felty Hessinger

## 2019-10-04 PROCEDURE — 88305 TISSUE EXAM BY PATHOLOGIST: CPT | Performed by: PATHOLOGY

## 2019-10-07 ENCOUNTER — LAB REQUISITION (OUTPATIENT)
Dept: LAB | Facility: HOSPITAL | Age: 69
End: 2019-10-07
Payer: MEDICARE

## 2019-10-07 DIAGNOSIS — D49.2 NEOPLASM OF UNSPECIFIED BEHAVIOR OF BONE, SOFT TISSUE, AND SKIN: ICD-10-CM

## 2019-10-15 PROCEDURE — 88305 TISSUE EXAM BY PATHOLOGIST: CPT | Performed by: PATHOLOGY

## 2019-10-17 ENCOUNTER — LAB REQUISITION (OUTPATIENT)
Dept: LAB | Facility: HOSPITAL | Age: 69
End: 2019-10-17
Payer: MEDICARE

## 2019-10-17 DIAGNOSIS — D49.2 NEOPLASM OF UNSPECIFIED BEHAVIOR OF BONE, SOFT TISSUE, AND SKIN: ICD-10-CM

## 2019-10-29 PROCEDURE — 88305 TISSUE EXAM BY PATHOLOGIST: CPT | Performed by: PATHOLOGY

## 2019-10-30 ENCOUNTER — LAB REQUISITION (OUTPATIENT)
Dept: LAB | Facility: HOSPITAL | Age: 69
End: 2019-10-30
Payer: MEDICARE

## 2019-10-30 DIAGNOSIS — D49.2 NEOPLASM OF UNSPECIFIED BEHAVIOR OF BONE, SOFT TISSUE, AND SKIN: ICD-10-CM

## 2020-01-17 PROCEDURE — 88305 TISSUE EXAM BY PATHOLOGIST: CPT | Performed by: PATHOLOGY

## 2020-01-18 ENCOUNTER — LAB REQUISITION (OUTPATIENT)
Dept: LAB | Facility: HOSPITAL | Age: 70
End: 2020-01-18
Payer: MEDICARE

## 2020-01-18 DIAGNOSIS — D49.2 NEOPLASM OF UNSPECIFIED BEHAVIOR OF BONE, SOFT TISSUE, AND SKIN: ICD-10-CM

## 2020-01-27 RX ORDER — LOSARTAN POTASSIUM 50 MG/1
50 TABLET ORAL DAILY
Status: ON HOLD | COMMUNITY
End: 2020-01-29 | Stop reason: ALTCHOICE

## 2020-01-27 RX ORDER — LUBIPROSTONE 8 UG/1
16 CAPSULE, GELATIN COATED ORAL 2 TIMES DAILY WITH MEALS
COMMUNITY
End: 2020-11-05 | Stop reason: ALTCHOICE

## 2020-01-27 RX ORDER — AZELASTINE 1 MG/ML
2 SPRAY, METERED NASAL 2 TIMES DAILY
COMMUNITY

## 2020-01-27 RX ORDER — ARIPIPRAZOLE 5 MG/1
5 TABLET ORAL DAILY
Status: ON HOLD | COMMUNITY
End: 2020-01-29 | Stop reason: ALTCHOICE

## 2020-01-27 RX ORDER — OXYCODONE HCL 10 MG/1
15 TABLET, FILM COATED, EXTENDED RELEASE ORAL 2 TIMES DAILY
COMMUNITY

## 2020-01-27 RX ORDER — QUETIAPINE FUMARATE 200 MG/1
200 TABLET, FILM COATED ORAL
Status: ON HOLD | COMMUNITY
End: 2020-01-29 | Stop reason: ALTCHOICE

## 2020-01-27 RX ORDER — OMEPRAZOLE 20 MG/1
20 CAPSULE, DELAYED RELEASE ORAL DAILY
Status: ON HOLD | COMMUNITY
End: 2020-01-29 | Stop reason: ALTCHOICE

## 2020-01-28 ENCOUNTER — ANESTHESIA EVENT (OUTPATIENT)
Dept: PERIOP | Facility: HOSPITAL | Age: 70
End: 2020-01-28
Payer: MEDICARE

## 2020-01-28 RX ORDER — TRAMADOL HYDROCHLORIDE 50 MG/1
50 TABLET ORAL EVERY 6 HOURS PRN
Status: ON HOLD | COMMUNITY
End: 2020-01-29 | Stop reason: ALTCHOICE

## 2020-01-28 RX ORDER — MONTELUKAST SODIUM 10 MG/1
10 TABLET ORAL
COMMUNITY
End: 2020-11-05 | Stop reason: ALTCHOICE

## 2020-01-28 RX ORDER — BENZONATATE 200 MG/1
200 CAPSULE ORAL 3 TIMES DAILY PRN
COMMUNITY
End: 2020-11-05 | Stop reason: ALTCHOICE

## 2020-01-28 RX ORDER — DICYCLOMINE HYDROCHLORIDE 10 MG/1
10 CAPSULE ORAL 3 TIMES DAILY PRN
COMMUNITY
End: 2022-01-27 | Stop reason: SDUPTHER

## 2020-01-28 RX ORDER — PROCHLORPERAZINE MALEATE 10 MG
10 TABLET ORAL EVERY 6 HOURS PRN
COMMUNITY
End: 2020-08-04 | Stop reason: SDUPTHER

## 2020-01-28 RX ORDER — BISOPROLOL FUMARATE 5 MG/1
5 TABLET ORAL DAILY
COMMUNITY
End: 2021-09-09

## 2020-01-28 RX ORDER — ROPINIROLE 1 MG/1
4 TABLET, FILM COATED ORAL DAILY
COMMUNITY

## 2020-01-28 RX ORDER — CETIRIZINE HYDROCHLORIDE 10 MG/1
10 TABLET ORAL DAILY
COMMUNITY

## 2020-01-28 NOTE — ANESTHESIA PREPROCEDURE EVALUATION
Review of Systems/Medical History  Patient summary reviewed  Chart reviewed      Cardiovascular  Hypertension , No angina , No PND,    Pulmonary  Smoker cigarette smoker  , Tobacco cessation counseling given Cumulative Pack Years: 4,        GI/Hepatic    GERD well controlled,        Negative  ROS        Endo/Other  Negative endo/other ROS      GYN       Hematology  Negative hematology ROS      Musculoskeletal  Back pain , spinal stenosis,   Comment: Trochanteric bursitis Arthritis     Neurology    Headaches,    Psychology   Anxiety, Depression ,   Chronic opioid dependence Chronic pain,            Physical Exam    Airway    Mallampati score: II  TM Distance: >3 FB  Neck ROM: full     Dental       Cardiovascular  Cardiovascular exam normal    Pulmonary  Pulmonary exam normal     Other Findings        Anesthesia Plan  ASA Score- 2     Anesthesia Type- IV sedation with anesthesia with ASA Monitors  Additional Monitors:   Airway Plan:         Plan Factors-    Induction-     Postoperative Plan-     Informed Consent- Anesthetic plan and risks discussed with patient  I personally reviewed this patient with the CRNA  Discussed and agreed on the Anesthesia Plan with the CRNA  Pauline Lennon

## 2020-01-28 NOTE — PRE-PROCEDURE INSTRUCTIONS
Pre-Surgery Instructions:   Medication Instructions    ARIPiprazole (ABILIFY) 5 mg tablet Instructed patient per Anesthesia Guidelines   azelastine (ASTELIN) 0 1 % nasal spray Instructed patient per Anesthesia Guidelines   benzonatate (TESSALON) 200 MG capsule Instructed patient per Anesthesia Guidelines   buPROPion (WELLBUTRIN XL) 300 mg 24 hr tablet Instructed patient per Anesthesia Guidelines   BUTALBITAL-ACETAMINOPHEN PO Instructed patient per Anesthesia Guidelines   cetirizine (ZyrTEC) 10 mg tablet Instructed patient per Anesthesia Guidelines   escitalopram (LEXAPRO) 5 mg tablet Instructed patient per Anesthesia Guidelines   LORazepam (ATIVAN) 2 mg tablet Instructed patient per Anesthesia Guidelines   losartan (COZAAR) 50 mg tablet Instructed patient per Anesthesia Guidelines   lubiprostone (AMITIZA) 8 mcg capsule Instructed patient per Anesthesia Guidelines   omeprazole (PriLOSEC) 20 mg delayed release capsule Instructed patient per Anesthesia Guidelines   oxyCODONE (OxyCONTIN) 10 mg 12 hr tablet Instructed patient per Anesthesia Guidelines

## 2020-01-29 ENCOUNTER — HOSPITAL ENCOUNTER (OUTPATIENT)
Facility: HOSPITAL | Age: 70
Setting detail: OUTPATIENT SURGERY
Discharge: HOME | End: 2020-01-29
Attending: PLASTIC SURGERY | Admitting: PLASTIC SURGERY
Payer: MEDICARE

## 2020-01-29 ENCOUNTER — ANESTHESIA (OUTPATIENT)
Dept: PERIOP | Facility: HOSPITAL | Age: 70
End: 2020-01-29
Payer: MEDICARE

## 2020-01-29 VITALS
DIASTOLIC BLOOD PRESSURE: 66 MMHG | RESPIRATION RATE: 18 BRPM | TEMPERATURE: 97.6 F | OXYGEN SATURATION: 98 % | HEIGHT: 65 IN | HEART RATE: 63 BPM | WEIGHT: 158 LBS | SYSTOLIC BLOOD PRESSURE: 137 MMHG | BODY MASS INDEX: 26.33 KG/M2

## 2020-01-29 DIAGNOSIS — H02.31 BLEPHAROCHALASIS RIGHT UPPER EYELID: ICD-10-CM

## 2020-01-29 DIAGNOSIS — H02.34 BLEPHAROCHALASIS LEFT UPPER EYELID: ICD-10-CM

## 2020-01-29 PROCEDURE — 88302 TISSUE EXAM BY PATHOLOGIST: CPT | Performed by: PATHOLOGY

## 2020-01-29 RX ORDER — PROPOFOL 10 MG/ML
INJECTION, EMULSION INTRAVENOUS CONTINUOUS PRN
Status: DISCONTINUED | OUTPATIENT
Start: 2020-01-29 | End: 2020-01-29 | Stop reason: SURG

## 2020-01-29 RX ORDER — MAGNESIUM HYDROXIDE 1200 MG/15ML
LIQUID ORAL AS NEEDED
Status: DISCONTINUED | OUTPATIENT
Start: 2020-01-29 | End: 2020-01-29 | Stop reason: HOSPADM

## 2020-01-29 RX ORDER — SODIUM CHLORIDE, SODIUM LACTATE, POTASSIUM CHLORIDE, CALCIUM CHLORIDE 600; 310; 30; 20 MG/100ML; MG/100ML; MG/100ML; MG/100ML
125 INJECTION, SOLUTION INTRAVENOUS CONTINUOUS
Status: DISCONTINUED | OUTPATIENT
Start: 2020-01-29 | End: 2020-01-29 | Stop reason: HOSPADM

## 2020-01-29 RX ORDER — ONDANSETRON 4 MG/1
4 TABLET, ORALLY DISINTEGRATING ORAL EVERY 6 HOURS PRN
Status: DISCONTINUED | OUTPATIENT
Start: 2020-01-29 | End: 2020-01-29 | Stop reason: HOSPADM

## 2020-01-29 RX ORDER — DIPHENHYDRAMINE HYDROCHLORIDE 50 MG/ML
12.5 INJECTION INTRAMUSCULAR; INTRAVENOUS ONCE AS NEEDED
Status: DISCONTINUED | OUTPATIENT
Start: 2020-01-29 | End: 2020-01-29 | Stop reason: HOSPADM

## 2020-01-29 RX ORDER — ONDANSETRON 2 MG/ML
INJECTION INTRAMUSCULAR; INTRAVENOUS AS NEEDED
Status: DISCONTINUED | OUTPATIENT
Start: 2020-01-29 | End: 2020-01-29 | Stop reason: SURG

## 2020-01-29 RX ORDER — SODIUM CHLORIDE, SODIUM LACTATE, POTASSIUM CHLORIDE, CALCIUM CHLORIDE 600; 310; 30; 20 MG/100ML; MG/100ML; MG/100ML; MG/100ML
INJECTION, SOLUTION INTRAVENOUS CONTINUOUS PRN
Status: DISCONTINUED | OUTPATIENT
Start: 2020-01-29 | End: 2020-01-29 | Stop reason: SURG

## 2020-01-29 RX ORDER — FENTANYL CITRATE 50 UG/ML
INJECTION, SOLUTION INTRAMUSCULAR; INTRAVENOUS AS NEEDED
Status: DISCONTINUED | OUTPATIENT
Start: 2020-01-29 | End: 2020-01-29 | Stop reason: SURG

## 2020-01-29 RX ORDER — BALANCED SALT SOLUTION 6.4; .75; .48; .3; 3.9; 1.7 MG/ML; MG/ML; MG/ML; MG/ML; MG/ML; MG/ML
SOLUTION OPHTHALMIC AS NEEDED
Status: DISCONTINUED | OUTPATIENT
Start: 2020-01-29 | End: 2020-01-29 | Stop reason: HOSPADM

## 2020-01-29 RX ORDER — PROPOFOL 10 MG/ML
INJECTION, EMULSION INTRAVENOUS AS NEEDED
Status: DISCONTINUED | OUTPATIENT
Start: 2020-01-29 | End: 2020-01-29 | Stop reason: SURG

## 2020-01-29 RX ORDER — GLYCOPYRROLATE 0.2 MG/ML
INJECTION INTRAMUSCULAR; INTRAVENOUS AS NEEDED
Status: DISCONTINUED | OUTPATIENT
Start: 2020-01-29 | End: 2020-01-29 | Stop reason: SURG

## 2020-01-29 RX ORDER — LIDOCAINE HYDROCHLORIDE AND EPINEPHRINE 5; 5 MG/ML; UG/ML
INJECTION, SOLUTION INFILTRATION; PERINEURAL AS NEEDED
Status: DISCONTINUED | OUTPATIENT
Start: 2020-01-29 | End: 2020-01-29 | Stop reason: HOSPADM

## 2020-01-29 RX ORDER — HYDROMORPHONE HCL/PF 1 MG/ML
0.5 SYRINGE (ML) INJECTION
Status: DISCONTINUED | OUTPATIENT
Start: 2020-01-29 | End: 2020-01-29 | Stop reason: HOSPADM

## 2020-01-29 RX ORDER — MIDAZOLAM HYDROCHLORIDE 2 MG/2ML
INJECTION, SOLUTION INTRAMUSCULAR; INTRAVENOUS AS NEEDED
Status: DISCONTINUED | OUTPATIENT
Start: 2020-01-29 | End: 2020-01-29 | Stop reason: SURG

## 2020-01-29 RX ORDER — NEOMYCIN SULFATE, POLYMYXIN B SULFATE, AND DEXAMETHASONE 3.5; 10000; 1 MG/G; [USP'U]/G; MG/G
OINTMENT OPHTHALMIC AS NEEDED
Status: DISCONTINUED | OUTPATIENT
Start: 2020-01-29 | End: 2020-01-29 | Stop reason: HOSPADM

## 2020-01-29 RX ORDER — FENTANYL CITRATE/PF 50 MCG/ML
25 SYRINGE (ML) INJECTION
Status: DISCONTINUED | OUTPATIENT
Start: 2020-01-29 | End: 2020-01-29 | Stop reason: HOSPADM

## 2020-01-29 RX ADMIN — FENTANYL CITRATE 50 MCG: 50 INJECTION INTRAMUSCULAR; INTRAVENOUS at 09:08

## 2020-01-29 RX ADMIN — MIDAZOLAM HYDROCHLORIDE 2 MG: 1 INJECTION, SOLUTION INTRAMUSCULAR; INTRAVENOUS at 08:47

## 2020-01-29 RX ADMIN — PROPOFOL 20 MG: 10 INJECTION, EMULSION INTRAVENOUS at 09:00

## 2020-01-29 RX ADMIN — FENTANYL CITRATE 50 MCG: 50 INJECTION INTRAMUSCULAR; INTRAVENOUS at 08:53

## 2020-01-29 RX ADMIN — GLYCOPYRROLATE 0.2 MG: 0.2 INJECTION, SOLUTION INTRAMUSCULAR; INTRAVENOUS at 08:46

## 2020-01-29 RX ADMIN — FENTANYL CITRATE 25 MCG: 50 INJECTION, SOLUTION INTRAMUSCULAR; INTRAVENOUS at 10:22

## 2020-01-29 RX ADMIN — FENTANYL CITRATE 25 MCG: 50 INJECTION, SOLUTION INTRAMUSCULAR; INTRAVENOUS at 10:14

## 2020-01-29 RX ADMIN — PROPOFOL 70 MCG/KG/MIN: 10 INJECTION, EMULSION INTRAVENOUS at 08:53

## 2020-01-29 RX ADMIN — ONDANSETRON HYDROCHLORIDE 4 MG: 2 INJECTION, SOLUTION INTRAMUSCULAR; INTRAVENOUS at 08:57

## 2020-01-29 RX ADMIN — FENTANYL CITRATE 25 MCG: 50 INJECTION, SOLUTION INTRAMUSCULAR; INTRAVENOUS at 10:27

## 2020-01-29 RX ADMIN — SODIUM CHLORIDE, SODIUM LACTATE, POTASSIUM CHLORIDE, AND CALCIUM CHLORIDE: .6; .31; .03; .02 INJECTION, SOLUTION INTRAVENOUS at 08:41

## 2020-01-29 NOTE — OP NOTE
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]     * Blepharochalasis right upper eyelid [H02 31]    OperativeProcedure(s) (LRB):  BLEPHAROPLASTY UPPER (Bilateral)    Specimen(s):  ID Type Source Tests Collected by Time Destination   1 : Right Upper Eyelid Skin Tissue Soft Tissue, Other TISSUE EXAM Matt Butler MD 2020 [id number]    2 : Left Upper Eyelid Skin Tissue Soft Tissue, Other TISSUE EXAM Matt Butler MD 2020 [id number]      Operative history:  Patient had visual field disturbance due to excess excessive upper eyelid skin bilaterally  This was removed with a curvilinear ellipse at this time  Operative procedure: The patient taken the operating placed supine the operating table  She was prepped and draped in usual fashion  Anesthesia was general supplemented xylocaine 1% with epinephrine  For both upper eyelids in identical fashion a Curvilinear ellipse was then designed going from the medial upper eyelid to just lateral to the eyebrow where a skin excess was noted with the lower border being the palpebral crease and the upper border being that on pinch test the could be removed out causing lagophthalmos  After the incisions blunt dissection underneath the skin was then done to allow to be easily removed with a regular as oculi muscle  A strip that was also removed  Careful hemostasis was achieved the bipolar  The incision closed with a 5-0 nylon running whipstitch  Light dressings were applied  Patient tolerated the procedure well  Taken to recovery in good condition        SIGNATURE: Matt Butler MD  DATE: January 29, 2020  TIME: 3:42 PM

## 2020-01-29 NOTE — NURSING NOTE
Returned from PACU at 1046  Alert and oriented  Pain 2/10 upper eye lids due to surgery  Did not want medication at this time  Ice continues to eye area  Suture lines intact to upper eye lids  Scant serosang drainage  Respirations easy and non labored  IV fluids continue  Call bell in reach

## 2020-01-29 NOTE — ANESTHESIA POSTPROCEDURE EVALUATION
Post-Op Assessment Note    CV Status:  Stable  Pain Score: 0    Pain management: satisfactory to patient     Mental Status:  Alert   Hydration Status:  Stable   PONV Controlled:  None   Airway Patency:  Patent   Post Op Vitals Reviewed: Yes      Staff: Anesthesiologist, with CRNAs           BP      Temp     Pulse     Resp      SpO2

## 2020-01-29 NOTE — DISCHARGE INSTRUCTIONS
Blepharoplasty   WHAT YOU NEED TO KNOW:   Blepharoplasty, or eye lift, is surgery to fix a sagging, drooping, or baggy eyelid  The upper and lower eyelids may be fixed  DISCHARGE INSTRUCTIONS:   Medicines:   · Medicines  can help decrease pain and swelling, or prevent an infection  · Take your medicine as directed  Contact your healthcare provider if you think your medicine is not helping or if you have side effects  Tell him or her if you are allergic to any medicine  Keep a list of the medicines, vitamins, and herbs you take  Include the amounts, and when and why you take them  Bring the list or the pill bottles to follow-up visits  Carry your medicine list with you in case of an emergency  Follow up with your healthcare provider as directed: You may need to return to have your eyelid checked or your stitches removed  Write down your questions so you remember to ask them during your visits  Care for your eye:   · Use artificial tears  twice a day if you have dry eye  · Apply ice  on your eye 15 to 20 minutes every hour or as directed  Use an ice pack, or put crushed ice in a plastic bag  Cover it with a towel  Ice helps prevent tissue damage and decreases swelling and pain  · Elevate your head and upper back  when you rest, such as in a recliner  Place extra pillows under your head and neck when you sleep in bed  Elevation will help decrease swelling  · Limit activity as directed  Do not lift objects over 20 pounds  Ask when you can return to your usual daily activities  · Care for your wound as directed  Carefully wash the wound with soap and water  Dry the area and put on new, clean bandages as directed  Change your bandages when they get wet or dirty  · Do not wear contact lenses or eye makeup  until your eye has healed  Wear sunglasses to protect your eye when you are outside  Contact your healthcare provider if:   · Your eye is red, swollen, or draining pus      · You have a rash around your eye  · You have a fever and chills  · You have questions or concerns about your condition or care  Seek care immediately or call 911 if:   · You have chest pain or trouble breathing  · You have vision loss  · Your eye begins to bleed  · You feel sudden, sharp pain in your eye  · Your stitches come apart  © 2017 [employer] Information is for End User's use only and may not be sold, redistributed or otherwise used for commercial purposes  All illustrations and images included in CareNotes® are the copyrighted property of [employer]  or [employer]  The above information is an  only  It is not intended as medical advice for individual conditions or treatments  Talk to your doctor, nurse or pharmacist before following any medical regimen to see if it is safe and effective for you

## 2020-01-29 NOTE — NURSING NOTE
When told she has a script for Tramadol she said she did not want it because it does not help  She does not want me to call Dr Ender Murguia because she said that she "has Oxy's at home" that she can take

## 2020-01-29 NOTE — INTERVAL H&P NOTE
H&P reviewed  After examining the patient I find no changes in the patients condition since the H&P had been written      Vitals:    01/29/20 0652   BP: 115/58   Pulse: 70   Resp: 20   Temp: (!) 96 °F (35 6 °C)   SpO2: 96%

## 2020-02-17 ENCOUNTER — APPOINTMENT (EMERGENCY)
Dept: RADIOLOGY | Facility: HOSPITAL | Age: 70
End: 2020-02-17
Payer: MEDICARE

## 2020-02-17 ENCOUNTER — HOSPITAL ENCOUNTER (EMERGENCY)
Facility: HOSPITAL | Age: 70
Discharge: HOME | End: 2020-02-17
Attending: EMERGENCY MEDICINE | Admitting: EMERGENCY MEDICINE
Payer: MEDICARE

## 2020-02-17 VITALS
OXYGEN SATURATION: 96 % | TEMPERATURE: 98.8 F | RESPIRATION RATE: 18 BRPM | HEART RATE: 68 BPM | SYSTOLIC BLOOD PRESSURE: 123 MMHG | DIASTOLIC BLOOD PRESSURE: 79 MMHG

## 2020-02-17 DIAGNOSIS — R05.3 CHRONIC COUGH: ICD-10-CM

## 2020-02-17 DIAGNOSIS — J40 BRONCHITIS: Primary | ICD-10-CM

## 2020-02-17 DIAGNOSIS — J06.9 UPPER RESPIRATORY TRACT INFECTION: ICD-10-CM

## 2020-02-17 LAB
ALBUMIN SERPL BCP-MCNC: 3.4 G/DL (ref 3.5–5)
ALP SERPL-CCNC: 83 U/L (ref 46–116)
ALT SERPL W P-5'-P-CCNC: 15 U/L (ref 12–78)
ANION GAP SERPL CALCULATED.3IONS-SCNC: 9 MMOL/L (ref 4–13)
AST SERPL W P-5'-P-CCNC: 20 U/L (ref 5–45)
ATRIAL RATE: 66 BPM
BASOPHILS # BLD AUTO: 0.01 THOUSANDS/ΜL (ref 0–0.1)
BASOPHILS NFR BLD AUTO: 0 % (ref 0–1)
BILIRUB SERPL-MCNC: 0.44 MG/DL (ref 0.2–1)
BUN SERPL-MCNC: 15 MG/DL (ref 5–25)
CALCIUM SERPL-MCNC: 9 MG/DL (ref 8.3–10.1)
CHLORIDE SERPL-SCNC: 101 MMOL/L (ref 100–108)
CO2 SERPL-SCNC: 27 MMOL/L (ref 21–32)
CREAT SERPL-MCNC: 0.91 MG/DL (ref 0.6–1.3)
EOSINOPHIL # BLD AUTO: 0.03 THOUSAND/ΜL (ref 0–0.61)
EOSINOPHIL NFR BLD AUTO: 1 % (ref 0–6)
ERYTHROCYTE [DISTWIDTH] IN BLOOD BY AUTOMATED COUNT: 13.2 % (ref 11.6–15.1)
GFR SERPL CREATININE-BSD FRML MDRD: 65 ML/MIN/1.73SQ M
GLUCOSE SERPL-MCNC: 97 MG/DL (ref 65–140)
HCT VFR BLD AUTO: 42.9 % (ref 34.8–46.1)
HGB BLD-MCNC: 14.3 G/DL (ref 11.5–15.4)
IMM GRANULOCYTES # BLD AUTO: 0.02 THOUSAND/UL (ref 0–0.2)
IMM GRANULOCYTES NFR BLD AUTO: 1 % (ref 0–2)
LYMPHOCYTES # BLD AUTO: 0.64 THOUSANDS/ΜL (ref 0.6–4.47)
LYMPHOCYTES NFR BLD AUTO: 18 % (ref 14–44)
MCH RBC QN AUTO: 31.5 PG (ref 26.8–34.3)
MCHC RBC AUTO-ENTMCNC: 33.3 G/DL (ref 31.4–37.4)
MCV RBC AUTO: 95 FL (ref 82–98)
MONOCYTES # BLD AUTO: 0.44 THOUSAND/ΜL (ref 0.17–1.22)
MONOCYTES NFR BLD AUTO: 12 % (ref 4–12)
NEUTROPHILS # BLD AUTO: 2.46 THOUSANDS/ΜL (ref 1.85–7.62)
NEUTS SEG NFR BLD AUTO: 68 % (ref 43–75)
NRBC BLD AUTO-RTO: 0 /100 WBCS
P AXIS: 71 DEGREES
PLATELET # BLD AUTO: 171 THOUSANDS/UL (ref 149–390)
PMV BLD AUTO: 10.8 FL (ref 8.9–12.7)
POTASSIUM SERPL-SCNC: 4.7 MMOL/L (ref 3.5–5.3)
PR INTERVAL: 118 MS
PROT SERPL-MCNC: 7.3 G/DL (ref 6.4–8.2)
QRS AXIS: 39 DEGREES
QRSD INTERVAL: 72 MS
QT INTERVAL: 366 MS
QTC INTERVAL: 383 MS
RBC # BLD AUTO: 4.54 MILLION/UL (ref 3.81–5.12)
SODIUM SERPL-SCNC: 137 MMOL/L (ref 136–145)
T WAVE AXIS: 60 DEGREES
TROPONIN I SERPL-MCNC: <0.02 NG/ML
VENTRICULAR RATE: 66 BPM
WBC # BLD AUTO: 3.6 THOUSAND/UL (ref 4.31–10.16)

## 2020-02-17 PROCEDURE — 93010 ELECTROCARDIOGRAM REPORT: CPT | Performed by: INTERNAL MEDICINE

## 2020-02-17 PROCEDURE — 94640 AIRWAY INHALATION TREATMENT: CPT | Performed by: EMERGENCY MEDICINE

## 2020-02-17 PROCEDURE — 96360 HYDRATION IV INFUSION INIT: CPT

## 2020-02-17 PROCEDURE — 71046 X-RAY EXAM CHEST 2 VIEWS: CPT

## 2020-02-17 PROCEDURE — 84484 ASSAY OF TROPONIN QUANT: CPT | Performed by: EMERGENCY MEDICINE

## 2020-02-17 PROCEDURE — 85025 COMPLETE CBC W/AUTO DIFF WBC: CPT | Performed by: EMERGENCY MEDICINE

## 2020-02-17 PROCEDURE — 80053 COMPREHEN METABOLIC PANEL: CPT | Performed by: EMERGENCY MEDICINE

## 2020-02-17 PROCEDURE — 99285 EMERGENCY DEPT VISIT HI MDM: CPT | Performed by: EMERGENCY MEDICINE

## 2020-02-17 PROCEDURE — 36415 COLL VENOUS BLD VENIPUNCTURE: CPT | Performed by: EMERGENCY MEDICINE

## 2020-02-17 PROCEDURE — 94640 AIRWAY INHALATION TREATMENT: CPT

## 2020-02-17 PROCEDURE — 93005 ELECTROCARDIOGRAM TRACING: CPT

## 2020-02-17 PROCEDURE — 99285 EMERGENCY DEPT VISIT HI MDM: CPT

## 2020-02-17 RX ORDER — ALBUTEROL SULFATE 2.5 MG/3ML
5 SOLUTION RESPIRATORY (INHALATION) ONCE
Status: COMPLETED | OUTPATIENT
Start: 2020-02-17 | End: 2020-02-17

## 2020-02-17 RX ORDER — ALBUTEROL SULFATE 90 UG/1
2 AEROSOL, METERED RESPIRATORY (INHALATION) ONCE
Status: COMPLETED | OUTPATIENT
Start: 2020-02-17 | End: 2020-02-17

## 2020-02-17 RX ORDER — PREDNISONE 20 MG/1
40 TABLET ORAL ONCE
Status: COMPLETED | OUTPATIENT
Start: 2020-02-17 | End: 2020-02-17

## 2020-02-17 RX ORDER — PREDNISONE 20 MG/1
40 TABLET ORAL DAILY
Qty: 8 TABLET | Refills: 0 | Status: SHIPPED | OUTPATIENT
Start: 2020-02-17 | End: 2020-02-21

## 2020-02-17 RX ORDER — AZITHROMYCIN 250 MG/1
500 TABLET, FILM COATED ORAL ONCE
Status: COMPLETED | OUTPATIENT
Start: 2020-02-17 | End: 2020-02-17

## 2020-02-17 RX ORDER — AZITHROMYCIN 500 MG/1
500 TABLET, FILM COATED ORAL EVERY 24 HOURS
Qty: 3 TABLET | Refills: 0 | Status: SHIPPED | OUTPATIENT
Start: 2020-02-17 | End: 2020-02-20

## 2020-02-17 RX ADMIN — PREDNISONE 40 MG: 20 TABLET ORAL at 15:17

## 2020-02-17 RX ADMIN — ALBUTEROL SULFATE 5 MG: 2.5 SOLUTION RESPIRATORY (INHALATION) at 14:33

## 2020-02-17 RX ADMIN — IPRATROPIUM BROMIDE 0.5 MG: 0.5 SOLUTION RESPIRATORY (INHALATION) at 14:33

## 2020-02-17 RX ADMIN — ALBUTEROL SULFATE 2 PUFF: 90 AEROSOL, METERED RESPIRATORY (INHALATION) at 16:13

## 2020-02-17 RX ADMIN — AZITHROMYCIN 500 MG: 250 TABLET, FILM COATED ORAL at 16:15

## 2020-02-17 RX ADMIN — SODIUM CHLORIDE 500 ML: 0.9 INJECTION, SOLUTION INTRAVENOUS at 15:16

## 2020-02-17 NOTE — ED ATTENDING ATTESTATION
2/17/2020  IKlarissa, , saw and evaluated the patient  I have discussed the patient with the resident/non-physician practitioner and agree with the resident's/non-physician practitioner's findings, Plan of Care, and MDM as documented in the resident's/non-physician practitioner's note, except where noted  All available labs and Radiology studies were reviewed  I was present for key portions of any procedure(s) performed by the resident/non-physician practitioner and I was immediately available to provide assistance  At this point I agree with the current assessment done in the Emergency Department  I have conducted an independent evaluation of this patient a history and physical is as follows: 71y F w/ cough/congestion and dyspnea for the last 2 months  Current episode started Saturday w/ cough, nasal congestion, sore throat and dyspnea  Sob started this am and is a new symptom for her  No relief tessalon perles, mucinex or singulair  Was on tamiflu in January for flu followed by doxycycline for bronchitis  Also given flovent inhaler but not using  Here for ongoing symptoms  Exam wnwd nad, oral mmm, eye perrl, neck supple, hrrr, lungs some end expiratory wheezing noted w/ scattered crackles b/l, abd nd, ext, no c/c/e, neuro non-focal   A/P Cough/wheezing - likely copd exac vs pna   Will get labs, cxr, ekg, give treatment and re-eval       ED Course         Critical Care Time  Procedures

## 2020-02-17 NOTE — ED NOTES
Patient given spacer for home  Patient demonstrates appropriate use of spacer with inhaler        Declan Muhammad RN  02/17/20 6085

## 2020-02-17 NOTE — ED PROVIDER NOTES
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  Patient had flu-like symptoms and also tested positive for flu  She was treated with oseltamivir  Felt slightly better afterward, but then developed worsening nasal congestion, cough, sore throat  She saw her PCP in mid January and was prescribed course of doxycycline  Chest x-ray at that time was negative for pneumonia  Patient was also tested for pertussis, which was negative  Patient says that she felt better last week, but babysat again and now has worsening symptoms including nagging cough that is worse at night, nasal congestion, sore throat, and some shortness of breath  She has no chest pain but does describe sensation of chest heaviness with coughing  She has been taking Tessalon Perles, Mucinex, and Singulair  Her PCP also gave her a prescription for a fluticasone inhaler, but she has not been using this  She has no formal diagnosis of COPD or asthma, but does have +smoking history  No objective fevers  No leg pain or swelling  She had a single episode of posttussive emesis yesterday  Denies any nausea currently  No abdominal pain, constipation, or diarrhea  Prior to Admission Medications   Prescriptions Last Dose Informant Patient Reported? Taking?    BUTALBITAL-ACETAMINOPHEN PO   Yes No   Sig: Take  mg by mouth as needed   LORazepam (ATIVAN) 2 mg tablet   No No   Sig: Take 1 tablet (2 mg total) by mouth daily as needed for anxiety   Melatonin 10 MG TABS No No   Sig: Take 1 tablet (10 mg total) by mouth daily at bedtime   azelastine (ASTELIN) 0 1 % nasal spray   Yes No   Si sprays into each nostril 2 (two) times a day Use in each nostril as directed   benzonatate (TESSALON) 200 MG capsule   Yes No   Sig: Take 200 mg by mouth 3 (three) times a day as needed for cough   bisoprolol (ZEBETA) 5 mg tablet   Yes No   Sig: Take 5 mg by mouth daily   buPROPion (WELLBUTRIN XL) 300 mg 24 hr tablet   No No   Sig: Take 1 tablet (300 mg total) by mouth daily   cetirizine (ZyrTEC) 10 mg tablet   Yes No   Sig: Take 10 mg by mouth daily   dicyclomine (BENTYL) 10 mg capsule   Yes No   Sig: Take 10 mg by mouth 3 (three) times a day as needed   escitalopram (LEXAPRO) 5 mg tablet   No No   Sig: Take 1 tablet (5 mg total) by mouth daily   lubiprostone (AMITIZA) 8 mcg capsule   Yes No   Sig: Take 16 mcg by mouth 2 (two) times a day with meals   montelukast (SINGULAIR) 10 mg tablet   Yes No   Sig: Take 10 mg by mouth daily at bedtime   oxyCODONE (OxyCONTIN) 10 mg 12 hr tablet   Yes No   Sig: Take 10 mg by mouth every 8 (eight) hours   prochlorperazine (COMPAZINE) 10 mg tablet   Yes No   Sig: Take 10 mg by mouth every 6 (six) hours as needed for nausea or vomiting   rOPINIRole (REQUIP) 1 mg tablet   Yes No   Sig: Take 1-2 mg by mouth daily at bedtime      Facility-Administered Medications: None       Past Medical History:   Diagnosis Date    Allergic rhinitis     Anxiety     Arthritis     Cancer (HCC)     Skin     Chronic pain disorder     knee and back    Chronic prescription opiate use     Depression     GERD (gastroesophageal reflux disease)     Hypertension     Irritable bowel syndrome     Migraines     Spinal stenosis        Past Surgical History:   Procedure Laterality Date    CHOLECYSTECTOMY      COLONOSCOPY      SD REV UPPER EYELID W EXCESS SKIN Bilateral 2020    Procedure: BLEPHAROPLASTY UPPER;  Surgeon: Yolande Collet, MD;  Location:  MAIN OR;  Service: Plastics    ROTATOR CUFF REPAIR Bilateral        Family History   Problem Relation Age of Onset    Breast cancer Mother     Alzheimer's disease Father     Depression Sister      I have reviewed and agree with the history as documented  Social History     Tobacco Use    Smoking status: Current Every Day Smoker     Packs/day: 0 50     Types: Cigarettes    Smokeless tobacco: Never Used   Substance Use Topics    Alcohol use: Yes     Comment: occ    Drug use: No        Review of Systems   Constitutional: Negative for chills and fever  HENT: Positive for congestion, postnasal drip and rhinorrhea  Negative for trouble swallowing  Eyes: Negative for discharge and redness  Respiratory: Positive for cough and shortness of breath  Negative for wheezing and stridor  Cardiovascular: Negative for chest pain and leg swelling  Gastrointestinal: Positive for vomiting  Negative for abdominal pain, constipation, diarrhea and nausea  Genitourinary: Negative for dysuria and flank pain  Musculoskeletal: Negative for neck pain and neck stiffness  Skin: Negative for pallor and rash  Neurological: Negative for dizziness, weakness and headaches  All other systems reviewed and are negative  Physical Exam  ED Triage Vitals [02/17/20 1335]   Temperature Pulse Respirations Blood Pressure SpO2   98 8 °F (37 1 °C) 70 16 (!) 187/86 94 %      Temp Source Heart Rate Source Patient Position - Orthostatic VS BP Location FiO2 (%)   Oral Monitor Sitting Right arm --      Pain Score       7             Orthostatic Vital Signs  Vitals:    02/17/20 1335 02/17/20 1443   BP: (!) 187/86 123/79   Pulse: 70 68   Patient Position - Orthostatic VS: Sitting        Physical Exam   Constitutional: She is oriented to person, place, and time  She appears well-developed and well-nourished  No distress  Occasional dry cough  HENT:   Head: Normocephalic and atraumatic  Eyes: Pupils are equal, round, and reactive to light  Conjunctivae and EOM are normal  No scleral icterus  Neck: Normal range of motion  Neck supple  Cardiovascular: Normal rate and regular rhythm  Exam reveals no gallop and no friction rub  No murmur heard  Pulmonary/Chest: Breath sounds normal  She has no wheezes  She has no rales  Work of breathing is normal   Mild end-expiratory wheezing, few scattered rales in lower lung fields bilaterally  Abdominal: Soft  She exhibits no distension  There is no tenderness  There is no rebound and no guarding  Musculoskeletal: Normal range of motion  She exhibits no edema or tenderness  Neurological: She is alert and oriented to person, place, and time  No cranial nerve deficit or sensory deficit  She exhibits normal muscle tone  Skin: Skin is warm and dry  She is not diaphoretic  No erythema  No pallor  Psychiatric: She has a normal mood and affect  Her behavior is normal    Nursing note and vitals reviewed        ED Medications  Medications   albuterol inhalation solution 5 mg (5 mg Nebulization Given 2/17/20 1433)   ipratropium (ATROVENT) 0 02 % inhalation solution 0 5 mg (0 5 mg Nebulization Given 2/17/20 1433)   sodium chloride 0 9 % bolus 500 mL (0 mL Intravenous Stopped 2/17/20 1610)   predniSONE tablet 40 mg (40 mg Oral Given 2/17/20 1517)   albuterol (PROVENTIL HFA,VENTOLIN HFA) inhaler 2 puff (2 puffs Inhalation Given 2/17/20 1613)   azithromycin (ZITHROMAX) tablet 500 mg (500 mg Oral Given 2/17/20 1615)       Diagnostic Studies  Results Reviewed     Procedure Component Value Units Date/Time    Troponin I [[id number]]  (Normal) Collected:  02/17/20 1451    Lab Status:  Final result Specimen:  Blood from Hand, Left Updated:  02/17/20 1519     Troponin I <0 02 ng/mL     Comprehensive metabolic panel [[id number]]  (Abnormal) Collected:  02/17/20 1451    Lab Status:  Final result Specimen:  Blood from Hand, Left Updated:  02/17/20 1516     Sodium 137 mmol/L      Potassium 4 7 mmol/L      Chloride 101 mmol/L CO2 27 mmol/L      ANION GAP 9 mmol/L      BUN 15 mg/dL      Creatinine 0 91 mg/dL      Glucose 97 mg/dL      Calcium 9 0 mg/dL      AST 20 U/L      ALT 15 U/L      Alkaline Phosphatase 83 U/L      Total Protein 7 3 g/dL      Albumin 3 4 g/dL      Total Bilirubin 0 44 mg/dL      eGFR 65 ml/min/1 73sq m     Narrative:       National Kidney Disease Foundation guidelines for Chronic Kidney Disease (CKD):     Stage 1 with normal or high GFR (GFR > 90 mL/min/1 73 square meters)    Stage 2 Mild CKD (GFR = 60-89 mL/min/1 73 square meters)    Stage 3A Moderate CKD (GFR = 45-59 mL/min/1 73 square meters)    Stage 3B Moderate CKD (GFR = 30-44 mL/min/1 73 square meters)    Stage 4 Severe CKD (GFR = 15-29 mL/min/1 73 square meters)    Stage 5 End Stage CKD (GFR <15 mL/min/1 73 square meters)  Note: GFR calculation is accurate only with a steady state creatinine    CBC and differential [[id number]]  (Abnormal) Collected:  02/17/20 1451    Lab Status:  Final result Specimen:  Blood from Hand, Left Updated:  02/17/20 1458     WBC 3 60 Thousand/uL      RBC 4 54 Million/uL      Hemoglobin 14 3 g/dL      Hematocrit 42 9 %      MCV 95 fL      MCH 31 5 pg      MCHC 33 3 g/dL      RDW 13 2 %      MPV 10 8 fL      Platelets 504 Thousands/uL      nRBC 0 /100 WBCs      Neutrophils Relative 68 %      Immat GRANS % 1 %      Lymphocytes Relative 18 %      Monocytes Relative 12 %      Eosinophils Relative 1 %      Basophils Relative 0 %      Neutrophils Absolute 2 46 Thousands/µL      Immature Grans Absolute 0 02 Thousand/uL      Lymphocytes Absolute 0 64 Thousands/µL      Monocytes Absolute 0 44 Thousand/µL      Eosinophils Absolute 0 03 Thousand/µL      Basophils Absolute 0 01 Thousands/µL                  XR chest 2 views   Final Result by Kari Cummins MD (02/17 1539)      No acute cardiopulmonary disease              Workstation performed: [id number]               Procedures  ECG 12 Lead Documentation Only  Date/Time: 2/17/2020 1:40 PM  Performed by: Warden Alexx MD  Authorized by: Warden Alexx MD     Indications / Diagnosis:  Chest heaviness  ECG reviewed by me, the ED Provider: yes    Patient location:  ED  Previous ECG:     Previous ECG:  Unavailable  Interpretation:     Interpretation: normal    Rate:     ECG rate:  66    ECG rate assessment: normal    Rhythm:     Rhythm: sinus rhythm    Ectopy:     Ectopy: none    QRS:     QRS axis:  Normal    QRS intervals:  Normal  Conduction:     Conduction: normal    ST segments:     ST segments:  Normal  T waves:     T waves: normal            ED Course  ED Course as of Feb 17 1627   Mon Feb 17, 2020 1600 Troponin I: <0 02   1600 WBC(!): 3 60   1600 Hemoglobin: 14 3         MDM  Number of Diagnoses or Management Options  Bronchitis: new and requires workup  Chronic cough: new and requires workup  Upper respiratory tract infection: new and requires workup     Amount and/or Complexity of Data Reviewed  Clinical lab tests: ordered and reviewed  Tests in the radiology section of CPT®: ordered and reviewed  Tests in the medicine section of CPT®: ordered and reviewed  Decide to obtain previous medical records or to obtain history from someone other than the patient: yes  Review and summarize past medical records: yes  Independent visualization of images, tracings, or specimens: yes    Patient Progress  Patient progress: improved    15-year-old woman here for chronic cough, shortness of breath, URI type symptoms  Patient is well-appearing and afebrile  On auscultation of her lungs I hear a few wheezes and rales in the lower fields  Although patient carries no formal diagnosis of COPD, given her smoking history I believe she would benefit from a breathing treatment and course of steroids  Will check a chest x-ray to evaluate for pneumonia    Will check EKG and troponin given her chest heaviness, although discomfort is much more likely to be secondary to her frequent coughing  Patient's chest x-ray shows no pneumonia  No leukocytosis  Troponin is negative  Patient was given a breathing treatment with subsequent improvement in her cough  Will discharge with course of steroids and azithromycin for COPD exacerbation  Patient was also given albuterol inhaler with a spacer  She will follow up with her PCP for formal pulmonary function testing for COPD  Return to ED if her symptoms worsen  Disposition  Final diagnoses:   Chronic cough   Upper respiratory tract infection   Bronchitis     Time reflects when diagnosis was documented in both MDM as applicable and the Disposition within this note     Time User Action Codes Description Comment    2/17/2020  4:06 PM Gloria Ayala Add [J44 1] COPD exacerbation ([address] )     2/17/2020  4:06 PM Gloria Ayala Add [R05] Chronic cough     2/17/2020  4:07 PM Gloria Ayala Add [J06 9] Upper respiratory tract infection     2/17/2020  4:07 PM Gloria Ayala Modify [R05] Chronic cough     2/17/2020  4:07 PM Gloria Ayala Remove [J44 1] COPD exacerbation ([facility] [address] )     2/17/2020  4:07 PM Gloria Ayala Add [J44 1] COPD exacerbation ([address] )     2/17/2020  4:07 PM Gloria Ayala Modify [R05] Chronic cough     2/17/2020  4:07 PM Gloria Ayala Modify [J44 1] COPD exacerbation ([address] )     2/17/2020  4:08 PM Gloria Ayala Modify [R05] Chronic cough     2/17/2020  4:08 PM Gloria Ayala Remove [J44 1] COPD exacerbation ([facility] [address] )     2/17/2020  4:08 PM Gloria Ayala Add [J40] Bronchitis     2/17/2020  4:08 PM Gloria Ayala Modify [R05] Chronic cough     2/17/2020  4:08 PM [address] Bronchitis       ED Disposition     ED Disposition Condition Date/Time Comment    Discharge Good Mon Feb 17, 2020  4:06 PM Bety Bauman discharge to home/self care              Follow-up Information     Follow up With Specialties Details Why Contact Info Additional Brenna 23, DO Internal Medicine Schedule an appointment as soon as possible for a visit  For ER follow-up [address] [address]  [address] [facility] Emergency Department Emergency Medicine Go to  If symptoms worsen [facility] [address]  [place] [address] ED, [address]  , [place], [place], [address]          Patient's Medications   Discharge Prescriptions    AZITHROMYCIN (ZITHROMAX) 500 MG TABLET    Take 1 tablet (500 mg total) by mouth every 24 hours for 3 days       Start Date: 2/17/2020 End Date: 2/20/2020       Order Dose: 500 mg       Quantity: 3 tablet    Refills: 0    PREDNISONE 20 MG TABLET    Take 2 tablets (40 mg total) by mouth daily for 4 days       Start Date: 2/17/2020 End Date: 2/21/2020       Order Dose: 40 mg       Quantity: 8 tablet    Refills: 0     No discharge procedures on file  ED Provider  Attending physically available and evaluated Charisma Monday  I managed the patient along with the ED Attending      Electronically Signed by         Jo Diaz MD  02/17/20 5704

## 2020-03-24 ENCOUNTER — TELEMEDICINE (OUTPATIENT)
Dept: PSYCHIATRY | Facility: CLINIC | Age: 70
End: 2020-03-24
Payer: MEDICARE

## 2020-03-24 DIAGNOSIS — G47.00 INSOMNIA, UNSPECIFIED TYPE: ICD-10-CM

## 2020-03-24 DIAGNOSIS — F33.41 RECURRENT MAJOR DEPRESSIVE DISORDER, IN PARTIAL REMISSION (HCC): Primary | ICD-10-CM

## 2020-03-24 DIAGNOSIS — F33.1 MODERATE EPISODE OF RECURRENT MAJOR DEPRESSIVE DISORDER (HCC): ICD-10-CM

## 2020-03-24 PROCEDURE — G2012 BRIEF CHECK IN BY MD/QHP: HCPCS | Performed by: NURSE PRACTITIONER

## 2020-03-24 RX ORDER — ESCITALOPRAM OXALATE 5 MG/1
5 TABLET ORAL DAILY
Qty: 30 TABLET | Refills: 5 | Status: SHIPPED | OUTPATIENT
Start: 2020-03-24 | End: 2020-11-05 | Stop reason: ALTCHOICE

## 2020-03-24 RX ORDER — BUPROPION HYDROCHLORIDE 300 MG/1
300 TABLET ORAL DAILY
Qty: 30 TABLET | Refills: 5 | Status: SHIPPED | OUTPATIENT
Start: 2020-03-24

## 2020-03-24 RX ORDER — ESZOPICLONE 2 MG/1
TABLET, FILM COATED ORAL
Qty: 30 TABLET | Refills: 0 | Status: SHIPPED | OUTPATIENT
Start: 2020-03-24 | End: 2020-03-25

## 2020-03-24 NOTE — PSYCH
Virtual Regular Visit    Problem List Items Addressed This Visit     None          [unfilled]    Reason for visit is treated for major depressive disorder, mood disorder and generalized anxiety disorder  She also suffers from chronic insomnia  Most of the medications we have tried for sleep have not been successful in relieving the symptoms  She was taking doxepin but unfortunately, it worked briefly but she was experiencing a significant side effect of dry mouth  We discussed other options in the only 2 medications she has not tried is [place] and [address]  So today I am going to add Lunesta to her medication regimen since give that a try to see if that would help her to sleep  She will continue with her other medications  Encounter provider MARSHAL Chapa    Provider located at [facility]      [unfilled]     After connecting through [employer], the patient was identified by name and date of birth  Zion Baird was informed that this is a telemedicine visit and that the visit is being conducted through telephone which may not be secure and therefore, might not be HIPAA-compliant  My office door was closed  No one else was in the room  She acknowledged consent and understanding of privacy and security of the video platform  The patient has agreed to participate and understands they can discontinue the visit at any time  Subjective  Zion Baird is a 77 y o  female who has a history of major depressive disorder, generalized anxiety disorder and insomnia  As noted above, the patient is suffering from insomnia  Her depression and anxiety are under good control for the time being  Unfortunately, she is still suffering from insomnia and having side effects to doxepin  Will trial Lunesta and she will let us know how that works for her  Otherwise follow-up is in 3 months         Past Medical History:   Diagnosis Date    Allergic rhinitis     Anxiety     Arthritis     Cancer ([facility] [address] )     Skin     Chronic pain disorder     knee and back    Chronic prescription opiate use     Depression     GERD (gastroesophageal reflux disease)     Hypertension     Irritable bowel syndrome     Migraines     Spinal stenosis        Past Surgical History:   Procedure Laterality Date    CHOLECYSTECTOMY      COLONOSCOPY      ND REV UPPER EYELID W EXCESS SKIN Bilateral 1/29/2020    Procedure: BLEPHAROPLASTY UPPER;  Surgeon: Johanna Ramirez MD;  Location: [facility] MAIN OR;  Service: Plastics    ROTATOR CUFF REPAIR Bilateral        Current Outpatient Medications   Medication Sig Dispense Refill    buPROPion (WELLBUTRIN XL) 300 mg 24 hr tablet Take 1 tablet (300 mg total) by mouth daily 30 tablet 5    escitalopram (LEXAPRO) 5 mg tablet Take 1 tablet (5 mg total) by mouth daily 30 tablet 5    LORazepam (ATIVAN) 2 mg tablet Take 1 tablet (2 mg total) by mouth daily as needed for anxiety 30 tablet 2    Melatonin 10 MG TABS Take 1 tablet (10 mg total) by mouth daily at bedtime 30 tablet 2    azelastine (ASTELIN) 0 1 % nasal spray 2 sprays into each nostril 2 (two) times a day Use in each nostril as directed      benzonatate (TESSALON) 200 MG capsule Take 200 mg by mouth 3 (three) times a day as needed for cough      bisoprolol (ZEBETA) 5 mg tablet Take 5 mg by mouth daily      BUTALBITAL-ACETAMINOPHEN PO Take  mg by mouth as needed      cetirizine (ZyrTEC) 10 mg tablet Take 10 mg by mouth daily      dicyclomine (BENTYL) 10 mg capsule Take 10 mg by mouth 3 (three) times a day as needed      lubiprostone (AMITIZA) 8 mcg capsule Take 16 mcg by mouth 2 (two) times a day with meals      montelukast (SINGULAIR) 10 mg tablet Take 10 mg by mouth daily at bedtime      oxyCODONE (OxyCONTIN) 10 mg 12 hr tablet Take 10 mg by mouth every 8 (eight) hours      prochlorperazine (COMPAZINE) 10 mg tablet Take 10 mg by mouth every 6 (six) hours as needed for nausea or vomiting      rOPINIRole (REQUIP) 1 mg tablet Take 1-2 mg by mouth daily at bedtime       No current facility-administered medications for this visit  Allergies   Allergen Reactions    Codeine Itching    Morphine GI Intolerance    Other Itching     Category: Adverse Reaction;     Metaxalone Hives and Rash     Reaction Date: [id number]; Category: Allergy;     Tapentadol Other (See Comments)     tachycardia       Review of Systems      I spent 25 minutes with the patient during this visit

## 2020-03-25 ENCOUNTER — TELEPHONE (OUTPATIENT)
Dept: PSYCHIATRY | Facility: CLINIC | Age: 70
End: 2020-03-25

## 2020-03-25 DIAGNOSIS — G47.00 INSOMNIA, UNSPECIFIED TYPE: Primary | ICD-10-CM

## 2020-03-25 RX ORDER — ZALEPLON 5 MG/1
CAPSULE ORAL
Qty: 60 CAPSULE | Refills: 1 | Status: SHIPPED | OUTPATIENT
Start: 2020-03-25 | End: 2020-03-25 | Stop reason: SDUPTHER

## 2020-03-25 RX ORDER — ZALEPLON 5 MG/1
CAPSULE ORAL
Qty: 30 CAPSULE | Refills: 1 | Status: SHIPPED | OUTPATIENT
Start: 2020-03-25 | End: 2020-04-27 | Stop reason: ALTCHOICE

## 2020-03-25 RX ORDER — ZALEPLON 5 MG/1
CAPSULE ORAL
Qty: 30 CAPSULE | Refills: 1 | Status: SHIPPED | OUTPATIENT
Start: 2020-03-25 | End: 2020-03-25 | Stop reason: SDUPTHER

## 2020-04-15 ENCOUNTER — DOCUMENTATION (OUTPATIENT)
Dept: PSYCHIATRY | Facility: CLINIC | Age: 70
End: 2020-04-15

## 2020-04-24 ENCOUNTER — TELEPHONE (OUTPATIENT)
Dept: PSYCHIATRY | Facility: CLINIC | Age: 70
End: 2020-04-24

## 2020-04-24 DIAGNOSIS — F33.1 MODERATE EPISODE OF RECURRENT MAJOR DEPRESSIVE DISORDER (HCC): ICD-10-CM

## 2020-04-24 DIAGNOSIS — G47.00 INSOMNIA, UNSPECIFIED TYPE: Primary | ICD-10-CM

## 2020-04-27 RX ORDER — HYDROXYZINE PAMOATE 50 MG/1
50 CAPSULE ORAL
Qty: 30 CAPSULE | Refills: 0 | Status: SHIPPED | OUTPATIENT
Start: 2020-04-27 | End: 2020-11-05 | Stop reason: ALTCHOICE

## 2020-04-27 RX ORDER — LORAZEPAM 1 MG/1
1.5 TABLET ORAL DAILY
Start: 2020-05-04 | End: 2020-08-04 | Stop reason: DRUGHIGH

## 2020-07-17 ENCOUNTER — TELEPHONE (OUTPATIENT)
Dept: NEUROLOGY | Facility: CLINIC | Age: 70
End: 2020-07-17

## 2020-07-21 NOTE — TELEPHONE ENCOUNTER
Returned a voicemail from patient left last night  Spoke with patient, patient is aware she needs a doctor to doctor referral  I told her it will take 24-48 hours before it is scanned into her chart

## 2020-07-24 ENCOUNTER — TELEPHONE (OUTPATIENT)
Dept: NEUROLOGY | Facility: CLINIC | Age: 70
End: 2020-07-24

## 2020-07-24 NOTE — TELEPHONE ENCOUNTER
Best contact number for patient:[phone]    Emergency Contact name and number:  Deni Luke [phone]  Referring provider and telephone number:  Micheal Joe [phone]  Primary Care Provider Name and if affiliated with [facility]:   Michael Joe No  Reason for Appointment/Dx:  Migraines  Neurology Location patient would like to be seen:  Any  Order received? Yes                                                 Records Received? Yes    Have you ever seen another Neurologist?       No    Insurance Information    Insurance Name:Medicare A/B     ID/Policy #:[id number]    Secondary Insurance:    ID/Policy#: Workman's Comp/ Accident/ School  Information      Workman's Comp/Accident/School related?        No    If yes name of Insurance company:    Date of Injury:    Type of Injury:    [address] Name and Telephone Number:    Notes:                   Appointment date:   7/30/2020

## 2020-07-30 ENCOUNTER — CONSULT (OUTPATIENT)
Dept: NEUROLOGY | Facility: CLINIC | Age: 70
End: 2020-07-30
Payer: MEDICARE

## 2020-07-30 VITALS
DIASTOLIC BLOOD PRESSURE: 82 MMHG | SYSTOLIC BLOOD PRESSURE: 112 MMHG | HEIGHT: 65 IN | TEMPERATURE: 98 F | WEIGHT: 169.8 LBS | BODY MASS INDEX: 28.29 KG/M2

## 2020-07-30 DIAGNOSIS — G43.011 INTRACTABLE MIGRAINE WITHOUT AURA AND WITH STATUS MIGRAINOSUS: ICD-10-CM

## 2020-07-30 DIAGNOSIS — G43.109 OCULAR MIGRAINE: Primary | ICD-10-CM

## 2020-07-30 PROBLEM — G43.019 INTRACTABLE MIGRAINE WITHOUT AURA: Status: ACTIVE | Noted: 2020-07-30

## 2020-07-30 PROCEDURE — 99204 OFFICE O/P NEW MOD 45 MIN: CPT | Performed by: PSYCHIATRY & NEUROLOGY

## 2020-07-30 RX ORDER — PROCHLORPERAZINE EDISYLATE 5 MG/ML
5 INJECTION INTRAMUSCULAR; INTRAVENOUS EVERY 4 HOURS PRN
Status: SHIPPED | OUTPATIENT
Start: 2020-07-30

## 2020-07-30 RX ORDER — FLUTICASONE PROPIONATE 50 MCG
2 SPRAY, SUSPENSION (ML) NASAL DAILY
COMMUNITY
End: 2021-09-09

## 2020-07-30 NOTE — ASSESSMENT & PLAN NOTE
Patient with known history of migraines  Now with a different pattern involving persistent ocular symptoms  Associated symptoms include:photophobia, phonophobia with improvement in the dark and cold, nausea but no vomiting and blurry vision at times  Migraine accompanied by tight muscles and tension in her back  As of recent she has been getting her headache every day, mostly in the morning she wakes up with ocular pain  As of now patient does not take any migraine medications  She takesOxycodone for back pain which used to incidentally help with migraine however this has not provided relief as of recent   Botox is the only thing that has worked for her in the past  No focal neurological deficit on exam      Plan:   · Will refer patient to headache specialist for botox injection   · In the interim, will get MRI brain and orbits w/wo contrast to make sure there isn't a vascular or structural cause behind her headaches   · Will give compazine to help with the nausea and headaches until patient sees headache specialist

## 2020-07-30 NOTE — PATIENT INSTRUCTIONS
Very nice to meet you  We will acquire an MRI of your brain and get you in with one of our headache clinic providers

## 2020-07-30 NOTE — PROGRESS NOTES
Patient ID: Guillermina Mazariegos is a 77 y o  female  Assessment/Plan:    Intractable migraine without aura  Patient with known history of migraines  Now with a different pattern involving persistent ocular symptoms  Associated symptoms include:photophobia, phonophobia with improvement in the dark and cold, nausea but no vomiting and blurry vision at times  Migraine accompanied by tight muscles and tension in her back  As of recent she has been getting her headache every day, mostly in the morning she wakes up with ocular pain  As of now patient does not take any migraine medications  She takesOxycodone for back pain which used to incidentally help with migraine however this has not provided relief as of recent  Botox is the only thing that has worked for her in the past  No focal neurological deficit on exam      Plan:   · Will refer patient to headache specialist for botox injection   · In the interim, will get MRI brain and orbits w/wo contrast to make sure there isn't a vascular or structural cause behind her headaches   · Will give compazine to help with the nausea and headaches until patient sees headache specialist       Diagnoses and all orders for this visit:    Ocular migraine  -     MRI brain and orbits wo and w contrast; Future  -     prochlorperazine (COMPAZINE) injection 5 mg    Intractable migraine without aura and with status migrainosus    Other orders  -     fluticasone (FLONASE) 50 mcg/act nasal spray; 2 sprays into each nostril daily         Subjective:    DIANE     Hi Owusu is a 7year-old female with past medical history of migraine headaches who presents to clinic for recurrent migraine headaches  Patient was diagnosed with migraines in her early 25s  She states that at baseline her migraines started with ocular pain bilaterally (R>L) and progressed to generalized pain throughout her head radiating to her occipital region    Pain usually resolves altogether however 6 weeks ago headache pattern changed  Now she gets ocular pain with progression to generalized pain over her head  Then the generalized pain resolved however the ocular pain persists  Along with the pain patient has photophobia, phonophobia and her symptoms are better in the dark and cold  Patient also gets nausea but no vomiting  She states that she has blurry vision at times  Denies lacrimation  She also states that she has tight muscles and tension in her back  As of recent she has been getting her headache every day, mostly in the morning she wakes up with ocular pain  As of now patient does not take any migraine medications  She takes oxycodone for her back pain and states that this use to help in the past but now it does not causing her to increase dosing  She takes up to 3 tablets of 5 mg of oxycodone daily  Previously in the past patient was treated at [facility] for her headaches  She states that she tried multiple migraine medications however did not tolerate them  We did work was Botox but her last dose was 6 years ago  At that time she was started on oxycodone and that helped control her pain  Patient does go to a chiropractor and gets adjustments regularly  These used to help with her headache for some time however for the past week and a half the headache has recurred within an hour to of the adjustment  Patient also states that she has poor sleeping patterns, sleeping 3-4 hours a day  Patient has problems falling asleep and staying asleep  August 11th patient has a sleep study scheduled  Patient recently followed up by Ophthalmology and she was noted to have a 2 point increased in ocular pressure bilaterally  In terms of her lifestyle patient lives alone  She drinks only decaf coffee  Patient does smoke, she drinks occasionally and denies use of illicit drugs  Smoke yes  Drinks [employer]  No illicit drugs         The following portions of the patient's history were reviewed and updated as appropriate: allergies, current medications, past family history, past medical history, past social history, past surgical history and problem listand [facility]  Objective:    Blood pressure 112/82, temperature 98 °F (36 7 °C), height 5' 5" (1 651 m), weight 77 kg (169 lb 12 8 oz)  Physical Exam   Eyes: Pupils are equal, round, and reactive to light  Lids are normal    Neurological: She has normal strength  Reflex Scores:       Tricep reflexes are 2+ on the right side and 2+ on the left side  Bicep reflexes are 2+ on the right side and 2+ on the left side  Brachioradialis reflexes are 2+ on the right side and 2+ on the left side  Patellar reflexes are 2+ on the right side and 2+ on the left side  Achilles reflexes are 0 on the right side and 0 on the left side  Neurological Exam  Mental Status  Awake, alert and oriented to person, place and time  Cranial Nerves  CN II: Visual fields full to confrontation  CN III, IV, VI: Extraocular movements intact bilaterally  Normal lids and orbits bilaterally  Pupils equal round and reactive to light bilaterally  CN V: Facial sensation is normal   CN VII: Full and symmetric facial movement  CN VIII: Hearing is normal   CN XI: Shoulder shrug strength is normal   CN XII: Tongue midline without atrophy or fasciculations  Motor   Strength is 5/5 throughout all four extremities  Sensory  Light touch is normal in upper and lower extremities  Temperature is normal in upper and lower extremities       Reflexes                                           Right                      Left  Brachioradialis                    2+                         2+  Biceps                                 2+                         2+  Triceps                                2+                         2+  Patellar                                2+                         2+  Achilles                                0 0  Plantar                           Downgoing                Downgoing  No pronator drift   Coordination  Right: Finger-to-nose normal   Left: Finger-to-nose normal     Gait  Casual gait is normal including stance, stride, and arm swing  ROS:    Review of Systems   Constitutional: Negative  Negative for appetite change and fever  HENT: Negative  Negative for hearing loss, tinnitus, trouble swallowing and voice change  Eyes: Positive for visual disturbance (right eye)  Negative for photophobia and pain  Respiratory: Negative  Negative for shortness of breath  Cardiovascular: Negative  Negative for palpitations  Gastrointestinal: Positive for nausea and vomiting  Endocrine: Negative  Negative for cold intolerance  Genitourinary: Negative  Negative for dysuria, frequency and urgency  Musculoskeletal: Negative  Negative for myalgias and neck pain  Skin: Negative  Negative for rash  Neurological: Positive for headaches  Negative for dizziness, tremors, seizures, syncope, facial asymmetry, speech difficulty, weakness, light-headedness and numbness  Hematological: Negative  Does not bruise/bleed easily  Psychiatric/Behavioral: Negative  Negative for confusion, hallucinations and sleep disturbance

## 2020-08-04 ENCOUNTER — OFFICE VISIT (OUTPATIENT)
Dept: NEUROLOGY | Facility: CLINIC | Age: 70
End: 2020-08-04
Payer: MEDICARE

## 2020-08-04 VITALS
DIASTOLIC BLOOD PRESSURE: 85 MMHG | HEART RATE: 85 BPM | TEMPERATURE: 97.6 F | SYSTOLIC BLOOD PRESSURE: 138 MMHG | WEIGHT: 169 LBS | BODY MASS INDEX: 28.12 KG/M2

## 2020-08-04 DIAGNOSIS — G43.719 INTRACTABLE CHRONIC MIGRAINE WITHOUT AURA AND WITHOUT STATUS MIGRAINOSUS: ICD-10-CM

## 2020-08-04 DIAGNOSIS — F51.01 PRIMARY INSOMNIA: ICD-10-CM

## 2020-08-04 DIAGNOSIS — G43.719 INTRACTABLE CHRONIC MIGRAINE WITHOUT AURA AND WITHOUT STATUS MIGRAINOSUS: Primary | ICD-10-CM

## 2020-08-04 DIAGNOSIS — M79.18 MYOFASCIAL PAIN: ICD-10-CM

## 2020-08-04 PROCEDURE — 99214 OFFICE O/P EST MOD 30 MIN: CPT | Performed by: PHYSICIAN ASSISTANT

## 2020-08-04 PROCEDURE — 96372 THER/PROPH/DIAG INJ SC/IM: CPT | Performed by: PHYSICIAN ASSISTANT

## 2020-08-04 RX ORDER — KETOROLAC TROMETHAMINE 30 MG/ML
60 INJECTION, SOLUTION INTRAMUSCULAR; INTRAVENOUS ONCE
Status: COMPLETED | OUTPATIENT
Start: 2020-08-04 | End: 2020-08-04

## 2020-08-04 RX ORDER — LORAZEPAM 2 MG/1
2 TABLET ORAL 3 TIMES DAILY PRN
COMMUNITY
Start: 2020-07-29

## 2020-08-04 RX ORDER — VERAPAMIL HYDROCHLORIDE 40 MG/1
TABLET ORAL
Qty: 60 TABLET | Refills: 2 | Status: SHIPPED | OUTPATIENT
Start: 2020-08-04 | End: 2020-09-25 | Stop reason: SDUPTHER

## 2020-08-04 RX ORDER — TIZANIDINE 2 MG/1
TABLET ORAL
Qty: 30 TABLET | Refills: 2 | Status: SHIPPED | OUTPATIENT
Start: 2020-08-04 | End: 2020-08-17 | Stop reason: SDUPTHER

## 2020-08-04 RX ORDER — PROCHLORPERAZINE MALEATE 10 MG
TABLET ORAL
Qty: 30 TABLET | Refills: 0 | Status: SHIPPED | OUTPATIENT
Start: 2020-08-04 | End: 2020-08-11 | Stop reason: SDUPTHER

## 2020-08-04 RX ORDER — DEXAMETHASONE 2 MG/1
TABLET ORAL
Qty: 5 TABLET | Refills: 0 | Status: SHIPPED | OUTPATIENT
Start: 2020-08-04 | End: 2020-09-04

## 2020-08-04 RX ADMIN — KETOROLAC TROMETHAMINE 60 MG: 30 INJECTION, SOLUTION INTRAMUSCULAR; INTRAVENOUS at 16:46

## 2020-08-04 NOTE — PROGRESS NOTES
Patient ID: Yessi Aguirre is a 77 y o  female  Assessment/Plan:     Diagnoses and all orders for this visit:    Intractable chronic migraine without aura and without status migrainosus  -     prochlorperazine (COMPAZINE) 10 mg tablet; 1 tab q6 hours prn nausea  -     Discontinue: Ubrogepant (UBRELVY) 50 MG tablet; 1-2 tabs at migraine onset, repeat in 2 hours if needed  Max 2 per day  -     dexamethasone (DECADRON) 2 mg tablet; 1 tab qam with food prn migraine   -     verapamil (CALAN) 40 mg tablet; 1 tab qhs x 1 week, then BID  -     tiZANidine (ZANAFLEX) 2 mg tablet; 1 tab qhs prn neck pain  -     Chemodenervation; Future  -     ketorolac (TORADOL) 60 mg/2 mL IM injection 60 mg    Primary insomnia    Myofascial pain  -     tiZANidine (ZANAFLEX) 2 mg tablet; 1 tab qhs prn neck pain    Other orders  -     LORazepam (ATIVAN) 2 mg tablet; Take 2 mg by mouth 3 (three) times a day as needed          Worsening migraine headaches: Will restart Botox  This was tried in the past and worked very well as noted below  Side effects reviewed again just in case  She will try tizanidine for neck pain and headache prevention  Side effects reviewed  For prevention of headaches she was agreeable to a low dose of verapamil, and Decadron to bridge to the verapamil and until she gets Botox  S/e of all meds above reviewed  Brain MRI scheduled  The patient should not hesitate to call me prior to her follow up with any questions or concerns  The patient was instructed to urgently call 911 or present to the nearest emergency room with any new or worsening neurological deficits  Subjective:    HPI    Ms Yessi Aguirre is a very pleasant 62-year-old female here for neurological follow-up for migraine headaches  Her headaches have been intractable for the past week or 2   She last saw neurology on 7/30/2020 and repeat brain MRI was recommended for worsening migraines which is currently scheduled for 8/10/2020  She also has low back pain on oxycodone and IBS  With her back pain she has difficulty standing for even short periods of time, and feels she can no longer work  She used to be a manager and sold [employer]  Prior documentation:  Patient was diagnosed with migraines in her early 25s  She states that at baseline her migraines started with ocular pain bilaterally (R>L) and progressed to generalized pain throughout her head radiating to her occipital region  Pain usually resolves altogether however 6 weeks ago headache pattern changed  Now she gets ocular pain with progression to generalized pain over her head  Then the generalized pain resolved however the ocular pain persists  Along with the pain patient has photophobia, phonophobia and her symptoms are better in the dark and cold  Patient also gets nausea but no vomiting  She states that she has blurry vision at times  Denies lacrimation  She also states that she has tight muscles and tension in her back  As of recent she has been getting her headache every day, mostly in the morning she wakes up with ocular pain  As of now patient does not take any migraine medications  She takes oxycodone for her back pain and states that this use to help in the past but now it does not causing her to increase dosing  She takes up to 3 tablets of 5 mg of oxycodone daily      Previously in the past patient was treated at [facility] for her headaches  She states that she tried multiple migraine medications however did not tolerate them  We did work was Botox but her last dose was 6 years ago  At that time she was started on oxycodone and that helped control her pain  She also states that she has poor sleeping patterns, sleeping 3-4 hours a day  Patient has problems falling asleep and staying asleep  August 11th patient has a sleep study scheduled      She recently saw Ophthalmology and she was noted to have a "2 point increase" in ocular pressure bilaterally (she goes to [facility])     In terms of her lifestyle patient lives alone  She drinks only decaf coffee  She does smoke, she drinks occasionally and denies use of illicit drugs  She has tried Botox in the past with significant reduction of headaches by greater than 50%  States she was placed on Oxy in 2007 which got rid of her migraines, so she stopped Botox  She understands that oxycodone is not an appropriate treatment for migraines and she wants to restart Botox  Sleep: she has difficulty falling and staying asleep  Sometimes she cannot get to sleep until 3:00 a m       Migraine  Onset: 20s  Head injury(?): denies    Current pain: 6-7/10  Reaches pain level at worst: 10/10  Frequency: daily for the past 1 5 weeks  Duration: 24 hours or a few days  Location: "Tremendous pain" around both eyes, feels like knives are twisting in both eyes R worse than L, headache/ pain radiates up to the apex and around to the occipital region b/l, a lot of neck tension b/l  Quality: sharp, knife like, tension  Alleviating factors: ice packs  Associated with: nausea, photophobia, phonophobia, dizziness sometimes, +congestion, no runny nose, no lacrimation    She gets some relief from:  Ice packs- for forehead and neck, ties with scarf on head and aspercream on neck  Medications tried:  Prevention- doxepin, Lexapro, bisoprolol   Abortive-Fioricet, narcotics, Toradol injection, Zofran, steroid, Compazine    Toradol IM injection works partially, Toradol p o  Does not work as well      Other non-medication therapies or treatments-  Med marijuana  Chiropractic care- helpful  acpuncture- not helpful  Massage-helpful but expensive    The following portions of the patient's history were reviewed and updated as appropriate: She  has a past medical history of Allergic rhinitis, Anxiety, Arthritis, Cancer ([address] ), Chronic pain disorder, Chronic prescription opiate use, Depression, GERD (gastroesophageal reflux disease), Hypertension, Irritable bowel syndrome, Migraines, and Spinal stenosis  She   Patient Active Problem List    Diagnosis Date Noted    Myofascial pain 08/10/2020    Intractable chronic migraine without aura and without status migrainosus 08/04/2020    Intractable migraine without aura 07/30/2020    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    Primary insomnia 05/07/2019    Insomnia due to other mental disorder 04/09/2019    Anxiety and depression 01/10/2019    Recurrent major depressive disorder, in partial remission ([facility] ) 04/25/2018     She  has a past surgical history that includes Colonoscopy; Cholecystectomy; Rotator cuff repair (Bilateral); and pr rev upper eyelid w excess skin (Bilateral, 1/29/2020)  Her family history includes Alzheimer's disease in her father; Breast cancer in her mother; Depression in her sister  She  reports that she has been smoking cigarettes  She has been smoking about 0 50 packs per day  She has never used smokeless tobacco  She reports current alcohol use  She reports that she does not use drugs    Current Outpatient Medications   Medication Sig Dispense Refill    azelastine (ASTELIN) 0 1 % nasal spray 2 sprays into each nostril 2 (two) times a day Use in each nostril as directed      buPROPion (WELLBUTRIN XL) 300 mg 24 hr tablet Take 1 tablet (300 mg total) by mouth daily 30 tablet 5    BUTALBITAL-ACETAMINOPHEN PO Take  mg by mouth as needed      dicyclomine (BENTYL) 10 mg capsule Take 10 mg by mouth 3 (three) times a day as needed      escitalopram (LEXAPRO) 5 mg tablet Take 1 tablet (5 mg total) by mouth daily 30 tablet 5    fluticasone (FLONASE) 50 mcg/act nasal spray 2 sprays into each nostril daily      hydrOXYzine pamoate (VISTARIL) 50 mg capsule Take 1 capsule (50 mg total) by mouth daily at bedtime as needed (sleep) 30 capsule 0    LORazepam (ATIVAN) 2 mg tablet Take 2 mg by mouth 3 (three) times a day as needed  montelukast (SINGULAIR) 10 mg tablet Take 10 mg by mouth daily at bedtime      oxyCODONE (OxyCONTIN) 10 mg 12 hr tablet Take 10 mg by mouth every 8 (eight) hours      prochlorperazine (COMPAZINE) 10 mg tablet 1 tab q6 hours prn nausea 30 tablet 0    rOPINIRole (REQUIP) 1 mg tablet Take 1 mg by mouth 3 (three) times a day       benzonatate (TESSALON) 200 MG capsule Take 200 mg by mouth 3 (three) times a day as needed for cough      bisoprolol (ZEBETA) 5 mg tablet Take 5 mg by mouth daily      cetirizine (ZyrTEC) 10 mg tablet Take 10 mg by mouth daily      dexamethasone (DECADRON) 2 mg tablet 1 tab qam with food prn migraine  5 tablet 0    lubiprostone (AMITIZA) 8 mcg capsule Take 16 mcg by mouth 2 (two) times a day with meals      Melatonin 10 MG TABS Take 1 tablet (10 mg total) by mouth daily at bedtime (Patient not taking: Reported on 8/4/2020) 30 tablet 2    Rimegepant Sulfate (NURTEC) 75 MG TBDP 1 tab at migraine onset  No more than one dose per 24 hours  Hold triptan  10 tablet 0    tiZANidine (ZANAFLEX) 2 mg tablet 1 tab qhs prn neck pain 30 tablet 2    verapamil (CALAN) 40 mg tablet 1 tab qhs x 1 week, then BID  60 tablet 2     Current Facility-Administered Medications   Medication Dose Route Frequency Provider Last Rate Last Dose    prochlorperazine (COMPAZINE) injection 5 mg  5 mg Intramuscular Q4H PRN Connie Govea MD         She is allergic to codeine; morphine; other; metaxalone; and tapentadol            Objective:    Blood pressure 138/85, pulse 85, temperature 97 6 °F (36 4 °C), weight 76 7 kg (169 lb)  Physical Exam    Neurological Exam  On neurologic exam, the patient is alert and oriented to time and place  Speech is fluent and articulate, and the patient follows commands appropriately  Judgment and affect appear normal  Pupils are equally round and reactive to light and extraocular muscles are intact without nystagmus   Face is symmetric, and tongue, uvula, and palate are midline  Facial sensation is normal and symmetric, in all 3 divisions of the trigeminal nerve  Hearing is intact  Motor examination reveals intact strength throughout  Normal gait is steady  ROS:    Review of Systems   Constitutional: Negative  Negative for appetite change and fever  HENT: Negative  Negative for hearing loss, tinnitus, trouble swallowing and voice change  Eyes: Positive for visual disturbance  Negative for photophobia and pain  Respiratory: Negative  Negative for shortness of breath  Cardiovascular: Negative  Negative for palpitations  Gastrointestinal: Negative  Negative for nausea and vomiting  Endocrine: Negative  Negative for cold intolerance  Genitourinary: Negative  Negative for dysuria, frequency and urgency  Musculoskeletal: Negative  Negative for myalgias and neck pain  Skin: Negative  Negative for rash  Neurological: Positive for headaches  Negative for dizziness, tremors, seizures, syncope, facial asymmetry, speech difficulty, weakness, light-headedness and numbness  Hematological: Negative  Does not bruise/bleed easily  Psychiatric/Behavioral: Negative  Negative for confusion, hallucinations and sleep disturbance  The following portions of the patient's history were reviewed and updated as appropriate: allergies, current medications/ medication history, past family history, past medical history, past social history, past surgical history and problem list     Review of systems was reviewed and otherwise unremarkable from a neurological perspective

## 2020-08-05 ENCOUNTER — TELEPHONE (OUTPATIENT)
Dept: NEUROLOGY | Facility: CLINIC | Age: 70
End: 2020-08-05

## 2020-08-05 DIAGNOSIS — G43.719 INTRACTABLE CHRONIC MIGRAINE WITHOUT AURA AND WITHOUT STATUS MIGRAINOSUS: Primary | ICD-10-CM

## 2020-08-05 NOTE — TELEPHONE ENCOUNTER
Pt made aware  Pt also states that she is scheduled to have mri on 8/10  She states that wearing a mask is going to be difficult  She states that she can wear it but keep it below her nose  She then states that she needs an open MRI  I made her aware that [facility] does not have an open mri  She will see if she can schedule at an open mri facility  I asked that she call us back when she schedules so we can fax script     She also asked if you can send an alternative to [facility] as it is not covered by insurance and no option for PA

## 2020-08-05 NOTE — TELEPHONE ENCOUNTER
Try tizanidine first, can take that up to q8 hours, if that fails okay for toradol  Want to try to limit toradol as much as possible  Thanks

## 2020-08-05 NOTE — TELEPHONE ENCOUNTER
Pt called back and states that she was able to find the verapamil  Made her aware this is a daily preventative medication  She states that she took decadron yesterday and did not help  Advised that it can take a few days of taking this before it is effective  She states that she has neck pain currently, and occular pain  She is asking if she can take tizanidine during the day  States that it did not make her tired last night  Please advise if you would like her to take tizanidine during the day and what are you recommending for the occular pain  She states that if you want her to come in for another toradol injection, she is agreeable  She states that it did help yesterday but yesterday she was 4/10 pain and today she is10/10     Please advise

## 2020-08-05 NOTE — TELEPHONE ENCOUNTER
----- Message from Pepper Malone MD sent at 8/5/2020  8:09 AM EDT -----  Regarding: RE: botox new start  Happy to do her injections  Bekah/ shaun valencia schedule    ----- Message -----  From: Nino Mohan PA-C  Sent: 8/4/2020   4:48 PM EDT  To: Brigitte Hayward, Osmani Navas MA, #  Subject: botox new start                                  This pt will get botox injections for chronic migraine  She is a new start, but she had them long ago at [facility]  She will be using Medicare hopefully  She also has [employer]  If she could be scheduled as early as the attending is available, that would be great  Dr Daniela Hopkins- not sure if you provide botox injections? I will be happy to see her in f/u  Thank you

## 2020-08-05 NOTE — TELEPHONE ENCOUNTER
Patient has Medicare Part [address]- no prior authorization is required  Katie,    Please contact the patient schedule a New Start Botox appointment with Dr Braxton Ignacio  It will be our stock   Please let me know once the patient is scheduled so I can attach a referral      Thank you,    John Manning

## 2020-08-05 NOTE — TELEPHONE ENCOUNTER
Patient calling in stating she was only able to receive decadron, tizanidine, compazine  She did not get verapamil or ubrelvy  at pharmacy  Patient states she took the decadron but she still has a migraine  I did make her aware that this medication is to be taken daily for 5 days  Called pharmacy and spoke to [facility] [place]  On 8/4 patient picked up verapamil, decadron, tizanidine, compazine  The only medication patient wasn't able to  was [place]  She states that medication is not covered by insurance  Doesn't need a PA just is not covered  She states they sent a request to our office for an alternative medication  [address] - agreeable to sending an alternative? Sharmila Barrios is on the phone w patient now and will tell her above and that she received Verapamil and Katherine Quintana is not covered

## 2020-08-05 NOTE — TELEPHONE ENCOUNTER
Pt called and states that she plans to get her brain MRI done at [facility]  Requesting brain MRI script be faxed to [facility] at [phone]  Fax sent    Arabella/Dulce made aware of the location                   [phone]

## 2020-08-05 NOTE — TELEPHONE ENCOUNTER
Pt called and states that [facility] was suppose to order med for ocular pain  Requesting rx be sent to [facility] pharmacy  Advised pt of the below  She verbalized understanding  Pt is insisting that she did not picked up the verapamil  Advised pt to call [facility] pharmacy

## 2020-08-05 NOTE — TELEPHONE ENCOUNTER
NICK Echavarria is trying to schedule with  Dr Landa  Please see another encounter open today 8/5/2020

## 2020-08-05 NOTE — TELEPHONE ENCOUNTER
Sorry thank you!!     Anna Schneider,    Once patient is scheduled let me know    Thanks    Lamine Cabrera

## 2020-08-05 NOTE — TELEPHONE ENCOUNTER
Left message for patient to contact office regarding scheduling [facility] appointment with Dr Teja Araujo

## 2020-08-05 NOTE — TELEPHONE ENCOUNTER
----- Message from Denis Favre, MD sent at 8/5/2020  8:09 AM EDT -----  Regarding: RE: botox new start  Happy to do her injections  Bekah/ shaun valencia schedule    ----- Message -----  From: Eder Guo PA-C  Sent: 8/4/2020   4:48 PM EDT  To: Rand Herrmann, Fernanda Fields MA, #  Subject: botox new start                                  This pt will get botox injections for chronic migraine  She is a new start, but she had them long ago at [facility]  She will be using Medicare hopefully  She also has [employer]  If she could be scheduled as early as the attending is available, that would be great  Dr Laura Gutierrez- not sure if you provide botox injections? I will be happy to see her in f/u  Thank you

## 2020-08-06 NOTE — TELEPHONE ENCOUNTER
Patient returning call and made aware of message  She will call [facility] to use their open MRI

## 2020-08-06 NOTE — TELEPHONE ENCOUNTER
Attempted to contact patient X 2      "The number you are trying to call is not reachable" and hangs up

## 2020-08-06 NOTE — TELEPHONE ENCOUNTER
Tried to contact patient- number on file in correct  No communication consent on file   Will mail unable to reach letter to patient

## 2020-08-07 NOTE — TELEPHONE ENCOUNTER
Pt called and states that on her SL javi it still has her MRI appt listed  I made her aware that she would need to call central scheduling to cancel this appt    Call transferred to central scheduling

## 2020-08-09 RX ORDER — UBROGEPANT 50 MG/1
TABLET ORAL
Qty: 10 TABLET | Refills: 0 | OUTPATIENT
Start: 2020-08-09

## 2020-08-10 ENCOUNTER — DOCUMENTATION (OUTPATIENT)
Dept: NEUROLOGY | Facility: CLINIC | Age: 70
End: 2020-08-10

## 2020-08-10 PROBLEM — M79.18 MYOFASCIAL PAIN: Status: ACTIVE | Noted: 2020-08-10

## 2020-08-10 NOTE — TELEPHONE ENCOUNTER
Patient called in and I addressed another message but saw that we needed an updated phone #   Best phone # is [phone]

## 2020-08-10 NOTE — PROGRESS NOTES
General  08/10/2020 11:38 AM  Reno Mark MA  CARE COORDINATION  -    Note     NEW START BOTOX 200 UNITS - NO AUTH NEEDED    STOCK

## 2020-08-10 NOTE — TELEPHONE ENCOUNTER
Patient calling in  She states tizanidine is not helpful  She additionally states she needs a new script for compazine as she "lost the bottle"  I told her to call the pharmacy and she states "no   A new script needs to be written and sent stating I can take 1-2 tabs daily "  Please send if agreeable    [phone]

## 2020-08-11 RX ORDER — PROCHLORPERAZINE MALEATE 10 MG
TABLET ORAL
Qty: 30 TABLET | Refills: 0 | Status: SHIPPED | OUTPATIENT
Start: 2020-08-11 | End: 2020-11-27

## 2020-08-11 NOTE — TELEPHONE ENCOUNTER
Sent Compazine but she can only take 10 mg at a time every 6 hours PRN  This has potential s/e if taken too much  Documentation only: I will only refill 30 every month until she gets her Botox  Then we need to decrease the amount of Compazine  Will discuss this with her at later date

## 2020-08-17 ENCOUNTER — TELEPHONE (OUTPATIENT)
Dept: NEUROLOGY | Facility: CLINIC | Age: 70
End: 2020-08-17

## 2020-08-17 DIAGNOSIS — M79.18 MYOFASCIAL PAIN: ICD-10-CM

## 2020-08-17 DIAGNOSIS — G43.719 INTRACTABLE CHRONIC MIGRAINE WITHOUT AURA AND WITHOUT STATUS MIGRAINOSUS: ICD-10-CM

## 2020-08-17 RX ORDER — TIZANIDINE 2 MG/1
TABLET ORAL
Qty: 60 TABLET | Refills: 0 | Status: SHIPPED | OUTPATIENT
Start: 2020-08-17 | End: 2020-08-19 | Stop reason: SDUPTHER

## 2020-08-17 NOTE — TELEPHONE ENCOUNTER
Patient calling in  She states that she is taking tizanidine 2mg tab, 1 tab BID  Patient is requesting a refill of this  Please send to pharmacy on file if agreeable  Patient also states that she has been having ongoing occular migraines that turn into "normal" migraines  She is wondering if there is a medication that she could try for the occular migraines   Please advise    [phone]   Ok to leave a detailed message

## 2020-08-18 NOTE — TELEPHONE ENCOUNTER
spoke to pt   nurtec does not help eye pain, has tons of pressure, feels like someone is pulling her eye balls out and twisting all at the same time  states that migraines start with occular pain, goes into a migraine and ends with the occular pain  per chart, Classie Severs is not covered and PA is not an option  she statestaht she will call insurance to see if can make any waves  she is agreeable to whatever med will help with the occular pain  She also states that you told her that she can take trizanidine 2mg every 8 hours but script has every 12 hours    Please advise on frequency

## 2020-08-18 NOTE — TELEPHONE ENCOUNTER
Patient reports she did get the nurtec however it does not help her occular migraines  The tizanidine also does not help  She is requesting something else be sent specifically for occular migraines

## 2020-08-18 NOTE — TELEPHONE ENCOUNTER
Is she referring to eye pain? Or migraine with aura? So far tried/ failed:  Decadron  nurtec  ubrelvy- not covered?  toradol injection  Narcotics  Fioricet    I do not have documented that she tried triptans  I will send imitrex or maxalt if pt agreeable  If not that, depakote taper

## 2020-08-19 RX ORDER — RIZATRIPTAN BENZOATE 5 MG/1
TABLET, ORALLY DISINTEGRATING ORAL
Qty: 9 TABLET | Refills: 2 | Status: SHIPPED | OUTPATIENT
Start: 2020-08-19 | End: 2020-10-28 | Stop reason: SDUPTHER

## 2020-08-19 RX ORDER — TIZANIDINE 2 MG/1
TABLET ORAL
Qty: 90 TABLET | Refills: 0 | Status: SHIPPED | OUTPATIENT
Start: 2020-08-19 | End: 2020-09-25

## 2020-08-19 NOTE — TELEPHONE ENCOUNTER
In another encounter I think the patient stated that she is taking tizanidine twice a day so that is why I filled it for b i d  I am okay with t i d  So she can take it t i d  and then I will refill  Will send a low dose of Maxalt

## 2020-08-21 ENCOUNTER — TELEPHONE (OUTPATIENT)
Dept: NEUROLOGY | Facility: CLINIC | Age: 70
End: 2020-08-21

## 2020-08-21 NOTE — TELEPHONE ENCOUNTER
Patient calling again regarding her migraines  She states that she is scheduled to see Dr Dot Choi on Monday but wanted to get a message over to her about her ocular migraines and that [address] has been trying to find a medication to help with them, but has been unsuccessful  Patient states that she believes she may need botox in the eye muscle area, but this is a delicate area  She wanted to make sure that Dr Dot Choi understood what is going on before she came in and that she is looking to figure out how to handle the ocular migraine pain at this time  If you want to ask her any questions before her OV, she wanted to provide her number      # [phone]

## 2020-08-21 NOTE — TELEPHONE ENCOUNTER
Patient called inquiring about her botox new start appt  According to the chart, patient cancelled her appt via MyChart and she denied doing so  I informed her that I could help her to reschedule her appt after confirming with Michelle Herr, Botox Coordinator the procedure for scheduling botox since the MA was unavailable to ask today  Rescheduled to Monday, 8/24/2020 8:20AM at [facility] location  Informed Danii of the ADD ON

## 2020-08-24 ENCOUNTER — PROCEDURE VISIT (OUTPATIENT)
Dept: NEUROLOGY | Facility: CLINIC | Age: 70
End: 2020-08-24
Payer: MEDICARE

## 2020-08-24 VITALS — DIASTOLIC BLOOD PRESSURE: 72 MMHG | TEMPERATURE: 97.4 F | HEART RATE: 85 BPM | SYSTOLIC BLOOD PRESSURE: 142 MMHG

## 2020-08-24 DIAGNOSIS — G43.719 INTRACTABLE CHRONIC MIGRAINE WITHOUT AURA AND WITHOUT STATUS MIGRAINOSUS: Primary | ICD-10-CM

## 2020-08-24 PROCEDURE — 64615 CHEMODENERV MUSC MIGRAINE: CPT | Performed by: PSYCHIATRY & NEUROLOGY

## 2020-08-24 NOTE — PROGRESS NOTES
Chemodenervation    Date/Time: 8/24/2020 9:01 AM  Performed by: Junie Aguayo DO  Authorized by: Junie Aguayo DO     Pre-procedure details:     Prepped With: Alcohol    Anesthesia (see MAR for exact dosages): Anesthesia method:  None  Procedure details:     Position:  Supine and upright  Botox:     Botox Type:  Type A    Brand:  Botox    mL's of Botulinum Toxin:  155    mL's of preservative free sterile saline:  2    Final Concentration per CC:  100 units    Needle Gauge:  30 G 2 5 inch  Procedures:     Botox Procedures: chronic headache      Indications: migraines    Injection Location:     Head / Face:  L superior cervical paraspinal, R superior cervical paraspinal, L , R , R frontalis, L frontalis, L lateral occipitalis, R lateral occipitalis, procerus, R temporalis, L temporalis, L superior trapezius and R superior trapezius    L  injection amount:  5 unit(s)    R  injection amount:  5 unit(s)    L lateral frontalis:  5 unit(s)    R lateral frontalis:  5 unit(s)    L medial frontalis:  5 unit(s)    R medial frontalis:  5 unit(s)    L temporalis injection amount:  20 unit(s)    R temporalis injection amount:  20 unit(s)    Procerus injection amount:  5 unit(s)    L lateral occipitalis injection amount:  15 unit(s)    R lateral occipitalis injection amount:  15 unit(s)    L superior cervical paraspinal injection amount:  10 unit(s)    R superior cervical paraspinal injection amount:  10 unit(s)    L superior trapezius injection amount:  15 unit(s)    R superior trapezius injection amount:  15 unit(s)  Total Units:     Total units used:  155    Total units discarded:  45  Post-procedure details:     Chemodenervation:  Chronic migraine    Facial Nerve Location[de-identified]  Bilateral facial nerve    Patient tolerance of procedure: Tolerated well, no immediate complications  Comments:      Did well with her first set of Botox injections today   Briefly discussed a/e again including ptosis   Will follow up for reassessment and likely reinjection in three months

## 2020-08-26 DIAGNOSIS — G43.719 INTRACTABLE CHRONIC MIGRAINE WITHOUT AURA AND WITHOUT STATUS MIGRAINOSUS: ICD-10-CM

## 2020-08-26 RX ORDER — DEXAMETHASONE 2 MG/1
TABLET ORAL
Qty: 5 TABLET | Refills: 0 | OUTPATIENT
Start: 2020-08-26

## 2020-08-26 NOTE — TELEPHONE ENCOUNTER
pt called regarding decadron refill   she states that she already took rizatriptan 3 times this week and has nothing else to take  still having daily headaches, not as bad but still daily  currently 5-6/10   also having occular pain, +nausea/light sensitivity  took tizanidine this am and this help but is wearing off now  also took compazine this am and helped alittle with both nausea and headache    verapamil 40mg 1 tab every other day,  per script, instructions were for 1 tab qhs x 1 week, then BID  she took 1 tab at hs for 1 week but then went to 1 tab every other day  she states that she misread the instructions      botox done on 8/24  please advise  [phone] to leave a detailed message

## 2020-08-28 RX ORDER — OLANZAPINE 2.5 MG/1
TABLET ORAL
Qty: 5 TABLET | Refills: 0 | Status: SHIPPED | OUTPATIENT
Start: 2020-08-28 | End: 2020-09-21

## 2020-09-02 ENCOUNTER — TELEPHONE (OUTPATIENT)
Dept: NEUROLOGY | Facility: CLINIC | Age: 70
End: 2020-09-02

## 2020-09-02 NOTE — TELEPHONE ENCOUNTER
Pt called and states she had botox on 8/24/20 but all her issues w/ her occular migraine was not addressed  She has upcoming appt w/ Dr Jim Moran next month  States that for the past 4 months, she wakes up w/ ocular migraine everyday  Tizanidine 2 mg is the only med that helps  Location/Description:  Sharp pain around her eyes and temple area  Pain scale: 8    Associated symptoms: sensitive to light     Precipitating factors: unknown  "I suffer from migraine all my life"    What medications have you tried for this migraine headache? Medications tried:  Prevention- doxepin, Lexapro, bisoprolol   Abortive-Fioricet, narcotics, Toradol injection, Zofran, steroid, Compazine    Current migraine medications are confirmed as:  Tizanidine 2 mg 1 tab q 8 hours prn-took 2 tabs yesterday and it helps w/ her neck pain  Olanzapine 2 5 mg 1 tab at bedtime prn-not effective  Rizatriptan 5 mg 1 tab prn- took 1 tab Monday, not effective  Verapamil 40 mg bid     Medications tried in the past? Tried decadron-not effective {ask if pt has tried decadron (steroid)  States that in the past she might have tried depakote for depression  Tried olanzapine but not effective     Pt is requesting sooner appt  India Vasquez or Drew Higgins can you accommodate this pt to Dr Jim Moran or 's schedule?          [phone] ok to leave detailed message

## 2020-09-04 ENCOUNTER — OFFICE VISIT (OUTPATIENT)
Dept: NEUROLOGY | Facility: CLINIC | Age: 70
End: 2020-09-04
Payer: MEDICARE

## 2020-09-04 ENCOUNTER — TELEPHONE (OUTPATIENT)
Dept: NEUROLOGY | Facility: CLINIC | Age: 70
End: 2020-09-04

## 2020-09-04 VITALS
BODY MASS INDEX: 28.66 KG/M2 | HEART RATE: 61 BPM | DIASTOLIC BLOOD PRESSURE: 69 MMHG | TEMPERATURE: 98 F | WEIGHT: 172 LBS | SYSTOLIC BLOOD PRESSURE: 161 MMHG | HEIGHT: 65 IN

## 2020-09-04 DIAGNOSIS — H57.13 PAIN OF BOTH EYES: ICD-10-CM

## 2020-09-04 DIAGNOSIS — R26.81 UNSTEADINESS ON FEET: ICD-10-CM

## 2020-09-04 DIAGNOSIS — G47.00 INSOMNIA, UNSPECIFIED TYPE: ICD-10-CM

## 2020-09-04 DIAGNOSIS — G43.719 INTRACTABLE CHRONIC MIGRAINE WITHOUT AURA AND WITHOUT STATUS MIGRAINOSUS: Primary | ICD-10-CM

## 2020-09-04 DIAGNOSIS — F51.01 PRIMARY INSOMNIA: ICD-10-CM

## 2020-09-04 DIAGNOSIS — M79.12 MYALGIA OF AUXILIARY MUSCLES, HEAD AND NECK: ICD-10-CM

## 2020-09-04 PROCEDURE — 99214 OFFICE O/P EST MOD 30 MIN: CPT | Performed by: PSYCHIATRY & NEUROLOGY

## 2020-09-04 PROCEDURE — 96372 THER/PROPH/DIAG INJ SC/IM: CPT | Performed by: PSYCHIATRY & NEUROLOGY

## 2020-09-04 RX ORDER — PREDNISONE 10 MG/1
TABLET ORAL
Qty: 20 TABLET | Refills: 0 | Status: SHIPPED | OUTPATIENT
Start: 2020-09-04 | End: 2020-11-05 | Stop reason: ALTCHOICE

## 2020-09-04 RX ORDER — KETOROLAC TROMETHAMINE 30 MG/ML
60 INJECTION, SOLUTION INTRAMUSCULAR; INTRAVENOUS ONCE
Status: COMPLETED | OUTPATIENT
Start: 2020-09-04 | End: 2020-09-04

## 2020-09-04 RX ADMIN — KETOROLAC TROMETHAMINE 60 MG: 30 INJECTION, SOLUTION INTRAMUSCULAR; INTRAVENOUS at 10:37

## 2020-09-04 NOTE — TELEPHONE ENCOUNTER
Per pt's phone call, she needs to see [facility] ASAP for "both of her migraines"  Per pt, ok to leave a detailed message with the apt date  Thank you!     Margarita

## 2020-09-04 NOTE — TELEPHONE ENCOUNTER
Left message informing patient that Lala Zheng ordered BW due to her worsening headaches and eye pain  Informed her to please get the ESR/CRP before she starts the Prednisone per Dr Landa request  Theses lab can be done non fasting  Placed script in the mail

## 2020-09-04 NOTE — PROGRESS NOTES
Patient ID: Alberto Gabriel is a 77 y o  female  Assessment/Plan:    No problem-specific Assessment & Plan notes found for this encounter  Diagnoses and all orders for this visit:    Intractable chronic migraine without aura and without status migrainosus  -     predniSONE 10 mg tablet; Take 4 tabs x 2 days, 3 tabs x 2 days, 2 tabs x 2 days, 1 tab x 2 days  Take in AM with food  -     ketorolac (TORADOL) 60 mg/2 mL IM injection 60 mg  -     Sedimentation rate, automated; Future  -     C-reactive protein; Future  -     TSH, 3rd generation with Free T4 reflex; Future  -     Vitamin B12; Future  -     Vitamin D 25 hydroxy; Future  -     JOYCE Screen w/ Reflex to Titer/Pattern; Future  I gave her a ubrelvy coupon and patient states she will put it through [employer] to see if it will be covered as medicare denied it  Will check with patient next visit if she is taking vitamin supplements such as Mag/B2  Primary insomnia  -     Cancel: Ambulatory referral to Sleep Medicine; Future-- cancelled [facility] referral as she already has [facility] referral per her  -     Sedimentation rate, automated; Future  -     C-reactive protein; Future  -     TSH, 3rd generation with Free T4 reflex; Future    Pain of both eyes  -     Sedimentation rate, automated; Future  -     C-reactive protein; Future  -     TSH, 3rd generation with Free T4 reflex; Future  -     Vitamin B12; Future  -     Vitamin D 25 hydroxy; Future  -     JOYCE Screen w/ Reflex to Titer/Pattern; Future    Insomnia, unspecified type   -     TSH, 3rd generation with Free T4 reflex; Future    Unsteadiness on feet   -     Vitamin B12; Future    Myalgia of auxiliary muscles, head and neck   -     Vitamin D 25 hydroxy; Future     Further recommendations pending above  I will see her next Botox visit, discussed cancelling 10/20 visit unless clinically necessary patient agreeable   Discussed with patient may take two to three sets of Botox injections to further help with headache  D/w patient even though significant eye pressure I would not do several Botox injections around the eye due to s/e profile such as ptosis  AT this time patient is not describing ocular migraines as no significant vision changes or aura  Discussed with patient can only do it q3 months per FDA protocol (pt wanted Botox to be repeated today)      Subjective:    HPI    Ms Jessica Hebert is a pleasant 78 yo female seen in follow up for chronic migraine- recently with significant worsening   She states Botox given 8/24/20 was helpful for neck pain and headaches but not the intense eye pressure or pain with movement she has been having for at least six weeks now- denies inciting etiology  Denies vision changes  Denies aura  States saw ophthalmology and no etiology found  Has had MRI Brain/orbits which are unremarkable- reviewed  She denies increased stressors specifically or medication change prior to worsening headaches  Denies new head or neck trauma  States over the last few months chronic sleep deprivation and insomnia and this is just getting worse   States they give her medications and nothing helps  D/w patient seeing sleep specialist  States has referral from [facility] and will schedule    States toradol injection in the past somewhat helpful and given migraine today and significant eye pressure wishes to try this again  Tizanidine helpful with neck pain but not eye pain  She states she used to be on more "pain meds" for chronic pain which took care of headaches and is now on a lower dose however this was changed longer than six weeks ago when she started noting worse headaches    Discussed prednisone taper for abortive relief she is agreeable  Other medications tried and failed for headache: verapamil, maxalt, olanzapine,tizanidine, compazine, fioricet, oxycodone    The following portions of the patient's history were reviewed and updated as appropriate: allergies, current medications, past family history, past medical history, past social history, past surgical history and problem list and ROS         Objective:    Blood pressure 161/69, pulse 61, temperature 98 °F (36 7 °C), height 5' 5" (1 651 m), weight 78 kg (172 lb)  Physical Exam  Vitals signs and nursing note reviewed  Constitutional:       Appearance: She is well-developed  HENT:      Head: Normocephalic and atraumatic  Eyes:      Extraocular Movements: Extraocular movements intact  Pupils: Pupils are equal, round, and reactive to light  Neck:      Musculoskeletal: Normal range of motion  Cardiovascular:      Rate and Rhythm: Normal rate and regular rhythm  Pulmonary:      Effort: Pulmonary effort is normal    Abdominal:      Palpations: Abdomen is soft  Musculoskeletal:         General: Tenderness (neck pain and eye pain) present  Neurological:      Mental Status: She is alert and oriented to person, place, and time  Cranial Nerves: No dysarthria  Coordination: Coordination is intact  Romberg sign negative  Deep Tendon Reflexes: Strength normal       Reflex Scores:       Patellar reflexes are 2+ on the right side and 2+ on the left side  Neurological Exam  Mental Status  Alert  Oriented to person, place, time and situation  no dysarthria present  Language is fluent with no aphasia  Attention and concentration are normal     Cranial Nerves  CN II: Visual fields full to confrontation  CN III, IV, VI: Extraocular movements intact bilaterally  Bilateral ptosis  Pupils equal round and reactive to light bilaterally  CN V: Facial sensation is normal   CN VII: Full and symmetric facial movement  CN VIII: Hearing is normal   CN XI: Shoulder shrug strength is normal   CN XII: Tongue midline without atrophy or fasciculations  Bl mild ptosis with eye pain and severe migraine today  Motor   Normal muscle tone  Strength is 5/5 throughout all four extremities      Sensory  Light touch is normal in upper and lower extremities  Temperature is normal in upper and lower extremities  No right-sided hemispatial neglect  No left-sided hemispatial neglect  Reflexes                                           Right                      Left  Patellar                                2+                         2+    Coordination  Finger-to-nose, rapid alternating movements and heel-to-shin normal bilaterally without dysmetria  Gait  Casual gait is normal including stance, stride, and arm swing  Romberg is absent  ROS:    Review of Systems   Constitutional: Negative  Negative for appetite change and fever  HENT: Negative  Negative for hearing loss, tinnitus, trouble swallowing and voice change  Eyes: Negative  Negative for photophobia and pain  Respiratory: Negative  Negative for shortness of breath  Cardiovascular: Negative  Negative for palpitations  Gastrointestinal: Negative  Negative for nausea and vomiting  Endocrine: Negative  Negative for cold intolerance  Genitourinary: Negative  Negative for dysuria, frequency and urgency  Musculoskeletal: Negative for myalgias and neck pain  Painful and tight on the neck area   Skin: Negative  Negative for rash  Allergic/Immunologic: Negative  Neurological: Positive for headaches  Negative for dizziness, tremors, seizures, syncope, facial asymmetry, speech difficulty, weakness, light-headedness and numbness  Occular migraines -every day   Hematological: Negative  Does not bruise/bleed easily  Psychiatric/Behavioral: Negative  Negative for confusion, hallucinations and sleep disturbance

## 2020-09-08 ENCOUNTER — TELEPHONE (OUTPATIENT)
Dept: NEUROLOGY | Facility: CLINIC | Age: 70
End: 2020-09-08

## 2020-09-08 NOTE — TELEPHONE ENCOUNTER
Patient calling regarding her labs  Patient advised that she is able to have her labs drawn without having the physical script at any of our  labs  Patient provided with the closest lab to her home as well as their hours  Phone call cut off prior to ending

## 2020-09-14 ENCOUNTER — APPOINTMENT (OUTPATIENT)
Dept: LAB | Facility: MEDICAL CENTER | Age: 70
End: 2020-09-14
Payer: MEDICARE

## 2020-09-14 DIAGNOSIS — M79.12 MYALGIA OF AUXILIARY MUSCLES, HEAD AND NECK: ICD-10-CM

## 2020-09-14 DIAGNOSIS — H57.13 PAIN OF BOTH EYES: ICD-10-CM

## 2020-09-14 DIAGNOSIS — G47.00 INSOMNIA, UNSPECIFIED TYPE: ICD-10-CM

## 2020-09-14 DIAGNOSIS — G43.719 INTRACTABLE CHRONIC MIGRAINE WITHOUT AURA AND WITHOUT STATUS MIGRAINOSUS: ICD-10-CM

## 2020-09-14 DIAGNOSIS — F51.01 PRIMARY INSOMNIA: ICD-10-CM

## 2020-09-14 DIAGNOSIS — R26.81 UNSTEADINESS ON FEET: ICD-10-CM

## 2020-09-14 LAB
25(OH)D3 SERPL-MCNC: 23.6 NG/ML (ref 30–100)
CRP SERPL QL: 10.9 MG/L
ERYTHROCYTE [SEDIMENTATION RATE] IN BLOOD: 10 MM/HOUR (ref 0–29)
TSH SERPL DL<=0.05 MIU/L-ACNC: 1.31 UIU/ML (ref 0.36–3.74)
VIT B12 SERPL-MCNC: 314 PG/ML (ref 100–900)

## 2020-09-14 PROCEDURE — 86038 ANTINUCLEAR ANTIBODIES: CPT

## 2020-09-14 PROCEDURE — 86140 C-REACTIVE PROTEIN: CPT

## 2020-09-14 PROCEDURE — 84443 ASSAY THYROID STIM HORMONE: CPT

## 2020-09-14 PROCEDURE — 85652 RBC SED RATE AUTOMATED: CPT

## 2020-09-14 PROCEDURE — 82306 VITAMIN D 25 HYDROXY: CPT

## 2020-09-14 PROCEDURE — 36415 COLL VENOUS BLD VENIPUNCTURE: CPT

## 2020-09-14 PROCEDURE — 82607 VITAMIN B-12: CPT

## 2020-09-16 ENCOUNTER — DOCUMENTATION (OUTPATIENT)
Dept: NEUROLOGY | Facility: CLINIC | Age: 70
End: 2020-09-16

## 2020-09-16 LAB — RYE IGE QN: NEGATIVE

## 2020-09-16 NOTE — PROGRESS NOTES
General  09/16/2020  1:10 PM  Sona Moss MA  CARE COORDINATION  -    Note     BOTOX 200 UNITS - NO AUTH NEEDED    STOCK

## 2020-09-17 ENCOUNTER — TELEPHONE (OUTPATIENT)
Dept: NEUROLOGY | Facility: CLINIC | Age: 70
End: 2020-09-17

## 2020-09-17 NOTE — TELEPHONE ENCOUNTER
Pt left vm stating that her lab results are in and asking if any recommendations      Please advise  [phone]

## 2020-09-18 ENCOUNTER — TELEPHONE (OUTPATIENT)
Dept: NEUROLOGY | Facility: CLINIC | Age: 70
End: 2020-09-18

## 2020-09-18 DIAGNOSIS — E53.8 B12 DEFICIENCY: ICD-10-CM

## 2020-09-18 DIAGNOSIS — R26.81 UNSTEADINESS ON FEET: ICD-10-CM

## 2020-09-18 DIAGNOSIS — G43.719 INTRACTABLE CHRONIC MIGRAINE WITHOUT AURA AND WITHOUT STATUS MIGRAINOSUS: Primary | ICD-10-CM

## 2020-09-18 NOTE — TELEPHONE ENCOUNTER
Pt called back, no sickness just migraines  States that she didn't start steroid taper yet   eye pain and vision continues  i recommended that she start steroid taper  she states that she will start this tomorrow  she states that she tried taking ativan 2mg tid for 3 days and then taper down to bid and this did give her some relief from the eye pain and horrible neck pain    earlos was previously prescribing  she is asking if you can prescribe ativan  I made her aware that this is something that we don't typically prescribe  states that this is the only thing that helps slightly  will go to pcp for b12   she will contact them regarding injections and let us know if they need anything from our office  she states that she has a form from [employer] in regards to [place]  states that this is for an appeal for ubrelvy  per previous encounter Rolene Camacho is not covered and PA is not an option  she is requesting to have a sooner appt than 11/5  You don't have any sooner appt available  [address]  has an 45 botox appt on 10/12 and a same day appt 10/16    would it be ok to schedule one of these patients  she is also asking that she be added to wait list   i added pt to wait list   Please advise  [phone] to leave a detailed message

## 2020-09-18 NOTE — TELEPHONE ENCOUNTER
----- Message from [address], DO sent at 9/18/2020  2:44 PM EDT -----  Called patient and left voicemail to call back  If she calls her inflammatory markers one of them is elevated, please ask her if she was sick around that time  If not ask her if prednisone/ steroid taper was helpful   If her eye pain and vision changes are persisting will send her for a special ultrasound that checks for temporal arteritis (inflammation of blood vessels)   B12 low normal recommend once monthly B12 injections with us or PCP  Vitamin D mildly low recommend 1000 units over the counter    Thank you

## 2020-09-18 NOTE — TELEPHONE ENCOUNTER
Noted, will see how she does with steroid taper    Can schedule with [address] from my standpoint if she has sooner opening but we need to see how she does with steroid taper first   Thanks

## 2020-09-21 DIAGNOSIS — G43.719 INTRACTABLE CHRONIC MIGRAINE WITHOUT AURA AND WITHOUT STATUS MIGRAINOSUS: ICD-10-CM

## 2020-09-21 RX ORDER — OLANZAPINE 2.5 MG/1
TABLET ORAL
Qty: 5 TABLET | Refills: 0 | Status: SHIPPED | OUTPATIENT
Start: 2020-09-21 | End: 2021-09-09

## 2020-09-21 NOTE — TELEPHONE ENCOUNTER
Called pt and states that she started steroid taper on 9/19/20  I don't see open slot w/ [address]  Moshe Guzmandwight, you have same day appt open on 10/2/20 and 10/5/20  Ok to schedule this pt w/ you?  If not, Elke Yusuf, can you accommodate this pt to 's schedule after 9/26/20?     thanks

## 2020-09-21 NOTE — TELEPHONE ENCOUNTER
Called and left message for patient offering an appointment for this Friday 9/25 w/ [facility] in the [facility] office  If patient calls back, please assist with scheduling

## 2020-09-21 NOTE — TELEPHONE ENCOUNTER
I can see her if nothing with Breann Dorantes, but would continue to have [facility] MA look for any openings on the schedule, as it is more appropriate for her to see the patient    Thanks

## 2020-09-22 NOTE — TELEPHONE ENCOUNTER
Patient is calling in regards to her B12 injections  Patient states she spoke with her PCP office and they advised her that they can administer the B12 injections however, we will need to send a Rx for the B12 injections to her local [facility] as they do not have the medication in the office  Please send Rx for Vitamin B12 injection to [facility] on [address]       Thank you    Hussein

## 2020-09-25 ENCOUNTER — OFFICE VISIT (OUTPATIENT)
Dept: NEUROLOGY | Facility: CLINIC | Age: 70
End: 2020-09-25
Payer: MEDICARE

## 2020-09-25 VITALS
HEART RATE: 61 BPM | SYSTOLIC BLOOD PRESSURE: 182 MMHG | HEIGHT: 65 IN | DIASTOLIC BLOOD PRESSURE: 77 MMHG | TEMPERATURE: 98 F | WEIGHT: 168 LBS | BODY MASS INDEX: 27.99 KG/M2

## 2020-09-25 DIAGNOSIS — M79.18 MYOFASCIAL MUSCLE PAIN: ICD-10-CM

## 2020-09-25 DIAGNOSIS — F51.01 PRIMARY INSOMNIA: ICD-10-CM

## 2020-09-25 DIAGNOSIS — F41.9 ANXIETY AND DEPRESSION: ICD-10-CM

## 2020-09-25 DIAGNOSIS — F32.A ANXIETY AND DEPRESSION: ICD-10-CM

## 2020-09-25 DIAGNOSIS — G43.719 INTRACTABLE CHRONIC MIGRAINE WITHOUT AURA AND WITHOUT STATUS MIGRAINOSUS: Primary | ICD-10-CM

## 2020-09-25 PROCEDURE — 20553 NJX 1/MLT TRIGGER POINTS 3/>: CPT | Performed by: PHYSICIAN ASSISTANT

## 2020-09-25 PROCEDURE — 99215 OFFICE O/P EST HI 40 MIN: CPT | Performed by: PHYSICIAN ASSISTANT

## 2020-09-25 RX ORDER — VERAPAMIL HYDROCHLORIDE 80 MG/1
TABLET ORAL
Qty: 180 TABLET | Refills: 0 | Status: SHIPPED | OUTPATIENT
Start: 2020-09-25 | End: 2021-01-05 | Stop reason: SDUPTHER

## 2020-09-25 RX ORDER — MAGNESIUM OXIDE 500 MG
5 TABLET ORAL
Qty: 30 TABLET | Refills: 0 | Status: SHIPPED | OUTPATIENT
Start: 2020-09-25 | End: 2020-10-26 | Stop reason: DRUGHIGH

## 2020-09-25 RX ORDER — BUPIVACAINE HYDROCHLORIDE 2.5 MG/ML
4 INJECTION, SOLUTION EPIDURAL; INFILTRATION; INTRACAUDAL ONCE
Status: COMPLETED | OUTPATIENT
Start: 2020-09-25 | End: 2020-09-25

## 2020-09-25 RX ADMIN — BUPIVACAINE HYDROCHLORIDE 4 ML: 2.5 INJECTION, SOLUTION EPIDURAL; INFILTRATION; INTRACAUDAL at 11:15

## 2020-09-25 NOTE — ASSESSMENT & PLAN NOTE
Hold tizanidine, ineffective  States she uses Ativan p r n , no more than a couple times per week, which works better than tizanidine  This is provided by Psychiatry  Trigger point injections provided today as noted below  Discussed the benefits of acupuncture and massage (locations provided), TENS unit, physical therapy including the use of posture corrector if needed

## 2020-09-25 NOTE — ASSESSMENT & PLAN NOTE
I asked her to stop taking 30 mg melatonin every night since this is a high dose and dangerous  I will see if an extended release of melatonin is available

## 2020-09-25 NOTE — ASSESSMENT & PLAN NOTE
Migraine headaches are improved with Botox but still frequent and severe  She has a large stress component  She sees Psychiatry at [facility] and a therapist weekly  Continue Botox injections q 3 months  Since starting botox, the patient reports greater than 7 days of migraine relief from baseline, correlated with headache diary, decreased abortive medication use and decreased ER visits  Continue trigger point injections on an emergent basis  Increase verapamil to 80 mg q 12 hours  Side effects reviewed  She asked if she could decrease lexapro because in the past she was told that this may worsen headaches, per headache specialist at [facility]  She currently takes 10 mg   I asked her to decrease to 5 mg and let her psychiatrist know  Any worsening headaches, irritability, anxiety, go back to of 10 mg daily  P r n  Migraine:  Can take Maxalt but no more than 3 per week to prevent medication overuse headache and vasoconstrictive effects  Also discussed magnesium and riboflavin use for prevention of migraines

## 2020-09-25 NOTE — PROGRESS NOTES
Patient ID: Yg Slade is a 77 y o  female  Assessment/Plan:    Myofascial muscle pain  Hold tizanidine, ineffective  States she uses Ativan p r n , no more than a couple times per week, which works better than tizanidine  This is provided by Psychiatry  Trigger point injections provided today as noted below  Discussed the benefits of acupuncture and massage (locations provided), TENS unit, physical therapy including the use of posture corrector if needed  Intractable chronic migraine without aura and without status migrainosus  Migraine headaches are improved with Botox but still frequent and severe  She has a large stress component  She sees Psychiatry at [facility] and a therapist weekly  Continue Botox injections q 3 months  Since starting botox, the patient reports greater than 7 days of migraine relief from baseline, correlated with headache diary, decreased abortive medication use and decreased ER visits  Continue trigger point injections on an emergent basis  Increase verapamil to 80 mg q 12 hours  Side effects reviewed  She asked if she could decrease lexapro because in the past she was told that this may worsen headaches, per headache specialist at [facility]  She currently takes 10 mg   I asked her to decrease to 5 mg and let her psychiatrist know  Any worsening headaches, irritability, anxiety, go back to of 10 mg daily  P r n  Migraine:  Can take Maxalt but no more than 3 per week to prevent medication overuse headache and vasoconstrictive effects  Also discussed magnesium and riboflavin use for prevention of migraines  Primary insomnia  I asked her to stop taking 30 mg melatonin every night since this is a high dose and dangerous  I will see if an extended release of melatonin is available  Anxiety and depression  Stable  Continue with psychiatry and therapy at [facility]         Diagnoses and all orders for this visit:    Intractable chronic migraine without aura and without status migrainosus  -     verapamil (CALAN) 80 mg tablet; 2 tabs q12 hours  Myofascial muscle pain  -     Cancel: Inj Trigger Point Single/Multiple; Future  -     bupivacaine (PF) (MARCAINE) 0 25 % injection 4 mL  -     Inj Trigger Point Single/Multiple    Primary insomnia  -     Melatonin ER 5 MG TBCR; Take 5 mg by mouth daily at bedtime    Anxiety and depression         The following portions of the patient's history were reviewed and updated as appropriate: allergies, current medications/ medication history, past family history, past medical history, past social history, past surgical history and problem list     Review of systems was reviewed and otherwise unremarkable from a neurological perspective  Subjective:    Ms Jason Noriega is a 61-year-old female here for neurological follow-up for migraine headaches  Her headaches have been intractable for the past week or 2  She last saw neurology on 7/30/2020 and repeat brain MRI was recommended for worsening migraines which is currently scheduled for 8/10/2020      She also has low back pain on oxycodone and IBS  With her back pain she has difficulty standing for even short periods of time, and feels she can no longer work  She used to be a manager and sold high [employer]  Since last seen the patient stopped tizanidine as it was ineffective  She reports Ativan is more effective for muscle tension and headaches, but does not use it every day  She still takes verapamil 40 mg q 12 hours and denies side effects  She was told in the past that Lexapro can worsen headaches, and remembered that she takes it  She was told this by a headache specialist at [facility] who she used to see  She is asking she can take less of this or stop it to see if her headaches improve  Since last seen she had Botox in August which reduced her headaches    Since starting botox, the patient reports greater than 7 days of migraine relief from baseline, correlated with headache diary, decreased abortive medication use and decreased ER visits  Still has some difficulty falling asleep  She tells me that she actually takes 30 mg of melatonin every night  She is wondering if there is an extended release version of melatonin  She is going through 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: 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 and there are personal stressors associated with this  She sees a therapist at [facility]; the patient reports she likes this therapist  She is wanting to find a new psychiatrist  She prefers to remain at [facility]; declines referral to [facility]) psychiatry  Recent repeat brain MRI with attention to the orbits is unremarkable      Prior documentation:  Patient was diagnosed with migraines in her early 20s   She states that at baseline her migraines started with ocular pain bilaterally (R>L) and progressed to generalized pain throughout her head radiating to her occipital region   Pain usually resolves altogether however 6 weeks ago headache pattern changed   Now she gets ocular pain with progression to generalized pain over her head   Then the generalized pain resolved however the ocular pain persists   Along with the pain patient has photophobia, phonophobia and her symptoms are better in the dark and cold   Patient also gets nausea but no vomiting   She states that she has blurry vision at times   Denies lacrimation   She also states that she has tight muscles and tension in her back   As of recent she has been getting her headache every day, mostly in the morning she wakes up with ocular pain   As of now patient does not take any migraine medications   She takes oxycodone for her back pain and states that this use to help in the past but now it does not causing her to increase dosing   She takes up to 3 tablets of 5 mg of oxycodone daily      Previously in the past patient was treated at [facility] for her headaches  Jey Madsen states that she tried multiple migraine medications however did not tolerate them   We did work was Botox but her last dose was 6 years ago  Shari Sullivan that time she was started on oxycodone and that helped control her pain      She also states that she has poor sleeping patterns, sleeping 3-4 hours a day   Patient has problems falling asleep and staying asleep   August 11th patient has a sleep study scheduled      She recently saw Ophthalmology and she was noted to have a "2 point increase" in ocular pressure bilaterally (she goes to [facility])     In terms of her lifestyle patient lives alone   She drinks only decaf coffee  She does smoke, she drinks occasionally and denies use of illicit drugs      States she was placed on Oxy in 2007 which got rid of her migraines, so she stopped Botox  She understands that oxycodone is not an appropriate treatment for migraines      Sleep: she has difficulty falling and staying asleep  Sometimes she cannot get to sleep until 3:00 a m        Migraine  Onset: 20s  Reaches pain level at worst: 10/10  Frequency: daily for the past 1 5 weeks  Duration: 24 hours or a few days  Location: "Tremendous pain" around both eyes, feels like knives are twisting in both eyes R worse than L, headache/ pain radiates up to the apex and around to the occipital region b/l, a lot of neck tension b/l  Quality: sharp, knife like, tension  Alleviating factors: ice packs  Associated with: nausea, photophobia, phonophobia, dizziness sometimes, +congestion, no runny nose, no lacrimation     She gets some relief from:  Ice packs- for forehead and neck, ties with scarf on head and aspercream on neck      Medications tried:  Prevention- doxepin, Lexapro, bisoprolol, verapamil, tizanidine  Abortive-Fioricet, narcotics, Toradol injection, Zofran, steroid, Compazine, Ativan, Maxalt, prednisone taper, olanzapine, oxycodone     Toradol IM injection works partially, Toradol p o  Does not work as well      Other non-medication therapies or treatments-  Med marijuana  Chiropractic care- helpful  acpuncture- not helpful  Massage-helpful but expensive    The following portions of the patient's history were reviewed and updated as appropriate:   She  has a past medical history of Allergic rhinitis, Anxiety, Arthritis, Cancer ([facility] [address] ), Chronic pain disorder, Chronic prescription opiate use, Depression, GERD (gastroesophageal reflux disease), Hypertension, Irritable bowel syndrome, Migraines, and Spinal stenosis  She   Patient Active Problem List    Diagnosis Date Noted    Myofascial muscle pain 09/25/2020    Myofascial pain 08/10/2020    Intractable chronic migraine without aura and without status migrainosus 08/04/2020    Intractable migraine without aura 07/30/2020    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    Primary insomnia 05/07/2019    Insomnia due to other mental disorder 04/09/2019    Anxiety and depression 01/10/2019    Recurrent major depressive disorder, in partial remission ([facility] [address] ) 04/25/2018     She  has a past surgical history that includes Colonoscopy; Cholecystectomy; Rotator cuff repair (Bilateral); and pr rev upper eyelid w excess skin (Bilateral, 1/29/2020)  Her family history includes Alzheimer's disease in her father; Breast cancer in her mother; Depression in her sister  She  reports that she has been smoking cigarettes  She has been smoking about 0 50 packs per day  She has never used smokeless tobacco  She reports current alcohol use  She reports that she does not use drugs    Current Outpatient Medications   Medication Sig Dispense Refill    azelastine (ASTELIN) 0 1 % nasal spray 2 sprays into each nostril 2 (two) times a day Use in each nostril as directed      bisoprolol (ZEBETA) 5 mg tablet Take 5 mg by mouth daily      buPROPion (WELLBUTRIN XL) 300 mg 24 hr tablet Take 1 tablet (300 mg total) by mouth daily 30 tablet 5    BUTALBITAL-ACETAMINOPHEN PO Take  mg by mouth as needed      cetirizine (ZyrTEC) 10 mg tablet Take 10 mg by mouth daily      Cyanocobalamin (B-12) 1000 MCG/ML KIT Inject 1 ml IM once monthly 1 kit 11    dicyclomine (BENTYL) 10 mg capsule Take 10 mg by mouth 3 (three) times a day as needed      escitalopram (LEXAPRO) 5 mg tablet Take 1 tablet (5 mg total) by mouth daily 30 tablet 5    fluticasone (FLONASE) 50 mcg/act nasal spray 2 sprays into each nostril daily      LORazepam (ATIVAN) 2 mg tablet Take 2 mg by mouth 3 (three) times a day as needed      oxyCODONE (OxyCONTIN) 10 mg 12 hr tablet Take 10 mg by mouth every 8 (eight) hours      predniSONE 10 mg tablet Take 4 tabs x 2 days, 3 tabs x 2 days, 2 tabs x 2 days, 1 tab x 2 days  Take in AM with food 20 tablet 0    prochlorperazine (COMPAZINE) 10 mg tablet 1 tab q6 hours prn nausea 30 tablet 0    rizatriptan (MAXALT-MLT) 5 MG disintegrating tablet 1 tab at migraine onset, repeat after 2 hours if needed; Max 2 per day and 3 per week  Stop Nurtec  9 tablet 2    rOPINIRole (REQUIP) 1 mg tablet Take 1 mg by mouth 3 (three) times a day       verapamil (CALAN) 80 mg tablet 2 tabs q12 hours   180 tablet 0    benzonatate (TESSALON) 200 MG capsule Take 200 mg by mouth 3 (three) times a day as needed for cough      hydrOXYzine pamoate (VISTARIL) 50 mg capsule Take 1 capsule (50 mg total) by mouth daily at bedtime as needed (sleep) (Patient not taking: Reported on 9/4/2020) 30 capsule 0    lubiprostone (AMITIZA) 8 mcg capsule Take 16 mcg by mouth 2 (two) times a day with meals      Melatonin ER 5 MG TBCR Take 5 mg by mouth daily at bedtime 30 tablet 0    montelukast (SINGULAIR) 10 mg tablet Take 10 mg by mouth daily at bedtime      OLANZapine (ZyPREXA) 2 5 mg tablet TAKE 1 TAB AT BEDTIME AS NEEDED MIGRAINE (Patient not taking: Reported on 9/25/2020) 5 tablet 0     Current Facility-Administered Medications   Medication Dose Route Frequency Provider Last Rate Last Dose    prochlorperazine (COMPAZINE) injection 5 mg  5 mg Intramuscular Q4H CHACHA Haney MD         She is allergic to codeine; morphine; other; metaxalone; and tapentadol            Objective:    Blood pressure (!) 182/77, pulse 61, temperature 98 °F (36 7 °C), height 5' 5" (1 651 m), weight 76 2 kg (168 lb)  Body mass index is 27 96 kg/m²  Physical Exam    Neurological Exam  On neurologic exam, the patient is alert and oriented to time and place  Speech is fluent and articulate, and the patient follows commands appropriately  Judgment and affect appear normal  Pupils are equally round and reactive to light and extraocular muscles are intact without nystagmus  Face is symmetric, and tongue, uvula, and palate are midline  Facial sensation is normal and symmetric, in all 3 divisions of the trigeminal nerve  Hearing is intact  Motor examination reveals intact strength throughout  Normal gait is steady  Tenderness to palpation in the cervical paraspinal muscles and trapezius muscles bilaterally  She is mildly photophobic and wears sunglasses  ROS:    Review of Systems   Constitutional: Negative  Negative for appetite change  HENT: Negative  Negative for trouble swallowing and voice change  Respiratory: Negative  Negative for shortness of breath  Cardiovascular: Negative  Negative for palpitations  Gastrointestinal: Negative  Endocrine: Negative  Negative for cold intolerance  Genitourinary: Negative  Negative for dysuria, frequency and urgency  Musculoskeletal: Negative for myalgias  Skin: Negative  Negative for rash  Allergic/Immunologic: Negative  Neurological: Positive for headaches  Negative for tremors, syncope, facial asymmetry, speech difficulty and light-headedness  Hematological: Negative  Does not bruise/bleed easily  Psychiatric/Behavioral: Negative  Negative for confusion, hallucinations and sleep disturbance       The following portions of the patient's history were reviewed and updated as appropriate: allergies, current medications/ medication history, past family history, past medical history, past social history, past surgical history and problem list     Review of systems was reviewed and otherwise unremarkable from a neurological perspective  Inj Trigger Point Single/Multiple     Date/Time 9/25/2020 11:46 AM     Performed by  Elzbieta Christian PA-C     Authorized by Elzbieta Christian PA-C      Universal Protocol Consent: The procedure was performed in an emergent situation  Verbal consent obtained  Written consent not obtained  Risks and benefits: risks, benefits and alternatives were discussed  Consent given by: patient  Patient understanding: patient states understanding of the procedure being performed  Patient consent: the patient's understanding of the procedure matches consent given  Procedure consent: procedure consent matches procedure scheduled       Procedure Details   Procedure Notes: A trigger point injection was performed at the site of maximal tenderness using 0 25% bupivacaine without epi, and without steroids  Injections were performed in the following locations:  Bilateral corrugators, bilateral frontalis muscles, bilateral occipitalis, bilateral cervical paraspinals and bilateral traps  This was well tolerated, and followed by moderate relief of pain  There were no complications; there was minimal bleeding  Adverse effects were discussed including that her headaches can get slightly worse in a few hours, but should improved by 24-48 hours

## 2020-09-25 NOTE — PATIENT INSTRUCTIONS
Massage:  [facility]- [address] [facility]- [phone]    Acupuncture:  Jason Diggs- @[facility]- [phone]  [address] [phone]  [address] [place] [address] [phone]    Medications:  Plan:  Decrease Lexapro from 10 to 5 mg starting today, let us know if you notice increased irritability, anxiety, or other side effects  Increase verapamil as dosed on bottle  Try Melatonin XL if available

## 2020-10-02 ENCOUNTER — CLINICAL SUPPORT (OUTPATIENT)
Dept: NEUROLOGY | Facility: CLINIC | Age: 70
End: 2020-10-02
Payer: MEDICARE

## 2020-10-02 ENCOUNTER — TELEPHONE (OUTPATIENT)
Dept: NEUROLOGY | Facility: CLINIC | Age: 70
End: 2020-10-02

## 2020-10-02 DIAGNOSIS — G43.719 INTRACTABLE CHRONIC MIGRAINE WITHOUT AURA AND WITHOUT STATUS MIGRAINOSUS: Primary | ICD-10-CM

## 2020-10-02 DIAGNOSIS — G43.719 INTRACTABLE CHRONIC MIGRAINE WITHOUT AURA AND WITHOUT STATUS MIGRAINOSUS: ICD-10-CM

## 2020-10-02 PROCEDURE — 96372 THER/PROPH/DIAG INJ SC/IM: CPT | Performed by: PSYCHIATRY & NEUROLOGY

## 2020-10-02 RX ORDER — KETOROLAC TROMETHAMINE 30 MG/ML
30 INJECTION, SOLUTION INTRAMUSCULAR; INTRAVENOUS ONCE
Status: COMPLETED | OUTPATIENT
Start: 2020-10-02 | End: 2020-10-02

## 2020-10-02 RX ADMIN — KETOROLAC TROMETHAMINE 30 MG: 30 INJECTION, SOLUTION INTRAMUSCULAR; INTRAVENOUS at 14:58

## 2020-10-05 ENCOUNTER — TELEPHONE (OUTPATIENT)
Dept: NEUROLOGY | Facility: CLINIC | Age: 70
End: 2020-10-05

## 2020-10-05 DIAGNOSIS — G43.719 INTRACTABLE CHRONIC MIGRAINE WITHOUT AURA AND WITHOUT STATUS MIGRAINOSUS: ICD-10-CM

## 2020-10-23 ENCOUNTER — TELEPHONE (OUTPATIENT)
Dept: NEUROLOGY | Facility: CLINIC | Age: 70
End: 2020-10-23

## 2020-10-23 DIAGNOSIS — F51.01 PRIMARY INSOMNIA: ICD-10-CM

## 2020-10-26 RX ORDER — SAW/PYGEUM/BETA/HERB/D3/B6/ZN 30 MG-25MG
CAPSULE ORAL
Qty: 30 TABLET | Refills: 2 | Status: SHIPPED | OUTPATIENT
Start: 2020-10-26 | End: 2021-01-15

## 2020-10-28 ENCOUNTER — TELEPHONE (OUTPATIENT)
Dept: NEUROLOGY | Facility: CLINIC | Age: 70
End: 2020-10-28

## 2020-10-28 RX ORDER — RIZATRIPTAN BENZOATE 10 MG/1
TABLET, ORALLY DISINTEGRATING ORAL
Qty: 9 TABLET | Refills: 0 | Status: SHIPPED | OUTPATIENT
Start: 2020-10-28 | End: 2020-11-23 | Stop reason: SDUPTHER

## 2020-10-30 ENCOUNTER — TELEPHONE (OUTPATIENT)
Dept: NEUROLOGY | Facility: CLINIC | Age: 70
End: 2020-10-30

## 2020-11-04 ENCOUNTER — TELEPHONE (OUTPATIENT)
Dept: NEUROLOGY | Facility: CLINIC | Age: 70
End: 2020-11-04

## 2020-11-05 ENCOUNTER — OFFICE VISIT (OUTPATIENT)
Dept: NEUROLOGY | Facility: CLINIC | Age: 70
End: 2020-11-05
Payer: MEDICARE

## 2020-11-05 VITALS
BODY MASS INDEX: 28.82 KG/M2 | TEMPERATURE: 96.2 F | DIASTOLIC BLOOD PRESSURE: 70 MMHG | HEART RATE: 72 BPM | SYSTOLIC BLOOD PRESSURE: 140 MMHG | WEIGHT: 173 LBS | HEIGHT: 65 IN

## 2020-11-05 DIAGNOSIS — F51.01 PRIMARY INSOMNIA: ICD-10-CM

## 2020-11-05 DIAGNOSIS — E53.8 B12 DEFICIENCY: ICD-10-CM

## 2020-11-05 DIAGNOSIS — G25.81 RESTLESS LEG SYNDROME: ICD-10-CM

## 2020-11-05 DIAGNOSIS — M79.18 MYOFASCIAL PAIN: ICD-10-CM

## 2020-11-05 DIAGNOSIS — F32.A ANXIETY AND DEPRESSION: ICD-10-CM

## 2020-11-05 DIAGNOSIS — E61.1 IRON DEFICIENCY: ICD-10-CM

## 2020-11-05 DIAGNOSIS — F41.9 ANXIETY AND DEPRESSION: ICD-10-CM

## 2020-11-05 DIAGNOSIS — G43.719 INTRACTABLE CHRONIC MIGRAINE WITHOUT AURA AND WITHOUT STATUS MIGRAINOSUS: Primary | ICD-10-CM

## 2020-11-05 PROCEDURE — 20553 NJX 1/MLT TRIGGER POINTS 3/>: CPT | Performed by: PHYSICIAN ASSISTANT

## 2020-11-05 PROCEDURE — 99215 OFFICE O/P EST HI 40 MIN: CPT | Performed by: PHYSICIAN ASSISTANT

## 2020-11-05 RX ORDER — GABAPENTIN 100 MG/1
CAPSULE ORAL
Qty: 90 CAPSULE | Refills: 2 | Status: SHIPPED | OUTPATIENT
Start: 2020-11-05 | End: 2020-11-27 | Stop reason: DRUGHIGH

## 2020-11-05 RX ORDER — PROMETHAZINE HYDROCHLORIDE 12.5 MG/1
12.5 TABLET ORAL EVERY 6 HOURS PRN
Qty: 30 TABLET | Refills: 0 | Status: SHIPPED | OUTPATIENT
Start: 2020-11-05 | End: 2020-11-27 | Stop reason: SDUPTHER

## 2020-11-06 RX ORDER — OMEGA-3/DHA/EPA/FISH OIL 35-113.5MG
TABLET,CHEWABLE ORAL
Qty: 30 TABLET | Refills: 5 | Status: SHIPPED | OUTPATIENT
Start: 2020-11-06 | End: 2021-02-23

## 2020-11-10 DIAGNOSIS — G43.719 INTRACTABLE CHRONIC MIGRAINE WITHOUT AURA AND WITHOUT STATUS MIGRAINOSUS: ICD-10-CM

## 2020-11-12 RX ORDER — BUTALBITAL/ACETAMINOPHEN 50MG-325MG
TABLET ORAL
Qty: 10 TABLET | Refills: 0 | Status: SHIPPED | OUTPATIENT
Start: 2020-11-12 | End: 2020-12-18 | Stop reason: SDUPTHER

## 2020-11-12 RX ORDER — RIZATRIPTAN BENZOATE 5 MG/1
TABLET, ORALLY DISINTEGRATING ORAL
Qty: 9 TABLET | Refills: 2 | OUTPATIENT
Start: 2020-11-12

## 2020-11-13 ENCOUNTER — TELEPHONE (OUTPATIENT)
Dept: NEUROLOGY | Facility: CLINIC | Age: 70
End: 2020-11-13

## 2020-11-20 ENCOUNTER — TELEPHONE (OUTPATIENT)
Dept: NEUROLOGY | Facility: CLINIC | Age: 70
End: 2020-11-20

## 2020-11-20 DIAGNOSIS — G43.719 INTRACTABLE CHRONIC MIGRAINE WITHOUT AURA AND WITHOUT STATUS MIGRAINOSUS: ICD-10-CM

## 2020-11-23 RX ORDER — RIZATRIPTAN BENZOATE 10 MG/1
TABLET, ORALLY DISINTEGRATING ORAL
Qty: 12 TABLET | Refills: 0 | Status: SHIPPED | OUTPATIENT
Start: 2020-11-23 | End: 2020-12-21 | Stop reason: SDUPTHER

## 2020-11-27 ENCOUNTER — PROCEDURE VISIT (OUTPATIENT)
Dept: NEUROLOGY | Facility: CLINIC | Age: 70
End: 2020-11-27
Payer: MEDICARE

## 2020-11-27 VITALS — TEMPERATURE: 97.3 F | SYSTOLIC BLOOD PRESSURE: 158 MMHG | HEART RATE: 80 BPM | DIASTOLIC BLOOD PRESSURE: 95 MMHG

## 2020-11-27 DIAGNOSIS — F51.01 PRIMARY INSOMNIA: ICD-10-CM

## 2020-11-27 DIAGNOSIS — G43.719 INTRACTABLE CHRONIC MIGRAINE WITHOUT AURA AND WITHOUT STATUS MIGRAINOSUS: ICD-10-CM

## 2020-11-27 DIAGNOSIS — G43.719 INTRACTABLE CHRONIC MIGRAINE WITHOUT AURA AND WITHOUT STATUS MIGRAINOSUS: Primary | ICD-10-CM

## 2020-11-27 PROCEDURE — 64615 CHEMODENERV MUSC MIGRAINE: CPT | Performed by: PSYCHIATRY & NEUROLOGY

## 2020-11-27 RX ORDER — SYRINGE W-NEEDLE,DISPOSAB,3 ML 25GX5/8"
SYRINGE, EMPTY DISPOSABLE MISCELLANEOUS
Qty: 9 EACH | Refills: 0 | Status: SHIPPED | OUTPATIENT
Start: 2020-11-27 | End: 2020-11-27

## 2020-11-27 RX ORDER — GABAPENTIN 300 MG/1
CAPSULE ORAL
Qty: 120 CAPSULE | Refills: 3 | Status: SHIPPED | OUTPATIENT
Start: 2020-11-27 | End: 2021-09-14 | Stop reason: SDUPTHER

## 2020-11-27 RX ORDER — KETOROLAC TROMETHAMINE 30 MG/ML
INJECTION, SOLUTION INTRAMUSCULAR; INTRAVENOUS
Qty: 4 ML | Refills: 1 | Status: SHIPPED | OUTPATIENT
Start: 2020-11-27 | End: 2020-12-04

## 2020-11-27 RX ORDER — PROMETHAZINE HYDROCHLORIDE 25 MG/1
TABLET ORAL
Qty: 30 TABLET | Refills: 2 | Status: SHIPPED | OUTPATIENT
Start: 2020-11-27 | End: 2021-03-01

## 2020-11-27 RX ORDER — BUPIVACAINE HYDROCHLORIDE 2.5 MG/ML
5 INJECTION, SOLUTION EPIDURAL; INFILTRATION; INTRACAUDAL ONCE
Status: COMPLETED | OUTPATIENT
Start: 2020-11-05 | End: 2020-11-27

## 2020-11-27 RX ORDER — NEEDLES, FILTER 19GX1 1/2"
NEEDLE, DISPOSABLE MISCELLANEOUS
Qty: 4 EACH | Refills: 3 | Status: SHIPPED | OUTPATIENT
Start: 2020-11-27 | End: 2021-03-23 | Stop reason: SDUPTHER

## 2020-11-27 RX ADMIN — BUPIVACAINE HYDROCHLORIDE 5 ML: 2.5 INJECTION, SOLUTION EPIDURAL; INFILTRATION; INTRACAUDAL at 08:21

## 2020-12-01 ENCOUNTER — TELEPHONE (OUTPATIENT)
Dept: NEUROLOGY | Facility: CLINIC | Age: 70
End: 2020-12-01

## 2020-12-04 RX ORDER — KETOROLAC TROMETHAMINE 30 MG/ML
INJECTION, SOLUTION INTRAMUSCULAR; INTRAVENOUS
Qty: 4 ML | Refills: 1 | Status: SHIPPED | OUTPATIENT
Start: 2020-12-04 | End: 2020-12-07

## 2020-12-07 RX ORDER — KETOROLAC TROMETHAMINE 30 MG/ML
INJECTION, SOLUTION INTRAMUSCULAR; INTRAVENOUS
Qty: 4 ML | Refills: 1 | Status: SHIPPED | OUTPATIENT
Start: 2020-12-07 | End: 2021-02-22 | Stop reason: SDUPTHER

## 2020-12-10 ENCOUNTER — TELEPHONE (OUTPATIENT)
Dept: NEUROLOGY | Facility: CLINIC | Age: 70
End: 2020-12-10

## 2020-12-14 DIAGNOSIS — G43.719 INTRACTABLE CHRONIC MIGRAINE WITHOUT AURA AND WITHOUT STATUS MIGRAINOSUS: ICD-10-CM

## 2020-12-14 NOTE — TELEPHONE ENCOUNTER
Patient c/o ongoing migraine with some right eye dropping and nausea since the botox inj on 11/27  Denies any other sxs  She stated she takes her toradol injection with rizatriptan and this helps to reduce the pain, but it eventually returns and has maxed out on these  Gabapentin 300 mg in the AM and 600 mg in the PM   Phenergan helps but she can only take this three times per week  Verapamil 80 mg BID  She took some aleve on Sunday but it hasn't helped  She stated she's hoping something else can be sent to pharmacy to break migraine  She stated she's tried and failed most meds  [facility]      Okay to leave    [phone]

## 2020-12-15 NOTE — TELEPHONE ENCOUNTER
Has patient talked to her psychiatrist about lamictal recommended last visit that can help with mood and headaches? Also has she tried once monthly injectables aimovig emgality or ajovy that can help reduce migraine severity frequency  These have minimal side effects 2-4% have constipation but majority do not have this  We have good success with this in a lot of patients whom Botox does not help all the way  If she is interested will send this over, she can do the injections herself- they are prefilled   These can help as quickly as one week in some patients    Thank you

## 2020-12-16 RX ORDER — BUTALBITAL/ACETAMINOPHEN 50MG-325MG
TABLET ORAL
Qty: 10 TABLET | Refills: 0 | Status: CANCELLED | OUTPATIENT
Start: 2020-12-16

## 2020-12-16 NOTE — TELEPHONE ENCOUNTER
Pt called the office back  She s/w her psychiatrist however they do not want to start lamictal as they are still  Increasing her other meds  She has never tried any of the CGRP injectables  She would be open to trying one- she has no preference for which      She is also asking for a refill of fioricet

## 2020-12-17 ENCOUNTER — TELEPHONE (OUTPATIENT)
Dept: NEUROLOGY | Facility: CLINIC | Age: 70
End: 2020-12-17

## 2020-12-17 DIAGNOSIS — G43.719 INTRACTABLE CHRONIC MIGRAINE WITHOUT AURA AND WITHOUT STATUS MIGRAINOSUS: Primary | ICD-10-CM

## 2020-12-17 DIAGNOSIS — G43.719 INTRACTABLE CHRONIC MIGRAINE WITHOUT AURA AND WITHOUT STATUS MIGRAINOSUS: ICD-10-CM

## 2020-12-17 RX ORDER — BUTALBITAL, ACETAMINOPHEN AND CAFFEINE 50; 325; 40 MG/1; MG/1; MG/1
TABLET ORAL
Qty: 10 TABLET | Refills: 0 | Status: SHIPPED | OUTPATIENT
Start: 2020-12-17 | End: 2020-12-18 | Stop reason: CLARIF

## 2020-12-17 RX ORDER — GALCANEZUMAB 120 MG/ML
INJECTION, SOLUTION SUBCUTANEOUS
Qty: 2 PEN | Refills: 0 | Status: SHIPPED | OUTPATIENT
Start: 2020-12-17 | End: 2021-02-17 | Stop reason: SDUPTHER

## 2020-12-18 RX ORDER — RIZATRIPTAN BENZOATE 10 MG/1
TABLET, ORALLY DISINTEGRATING ORAL
Qty: 12 TABLET | Refills: 0 | OUTPATIENT
Start: 2020-12-18

## 2020-12-18 RX ORDER — BUTALBITAL/ACETAMINOPHEN 50MG-325MG
TABLET ORAL
Qty: 10 TABLET | Refills: 2 | Status: SHIPPED | OUTPATIENT
Start: 2020-12-18 | End: 2021-02-11 | Stop reason: SDUPTHER

## 2020-12-19 DIAGNOSIS — G43.719 INTRACTABLE CHRONIC MIGRAINE WITHOUT AURA AND WITHOUT STATUS MIGRAINOSUS: ICD-10-CM

## 2020-12-19 DIAGNOSIS — M79.18 MYOFASCIAL PAIN: ICD-10-CM

## 2020-12-20 RX ORDER — TIZANIDINE 2 MG/1
TABLET ORAL
Qty: 30 TABLET | Refills: 0 | Status: SHIPPED | OUTPATIENT
Start: 2020-12-20 | End: 2021-08-10 | Stop reason: ALTCHOICE

## 2020-12-21 DIAGNOSIS — G43.719 INTRACTABLE CHRONIC MIGRAINE WITHOUT AURA AND WITHOUT STATUS MIGRAINOSUS: ICD-10-CM

## 2020-12-22 RX ORDER — RIZATRIPTAN BENZOATE 10 MG/1
TABLET, ORALLY DISINTEGRATING ORAL
Qty: 12 TABLET | Refills: 2 | Status: SHIPPED | OUTPATIENT
Start: 2020-12-22 | End: 2021-03-01 | Stop reason: SDUPTHER

## 2021-01-05 DIAGNOSIS — G43.719 INTRACTABLE CHRONIC MIGRAINE WITHOUT AURA AND WITHOUT STATUS MIGRAINOSUS: ICD-10-CM

## 2021-01-05 RX ORDER — VERAPAMIL HYDROCHLORIDE 80 MG/1
TABLET ORAL
Qty: 180 TABLET | Refills: 0 | Status: SHIPPED | OUTPATIENT
Start: 2021-01-05 | End: 2021-04-15 | Stop reason: SDUPTHER

## 2021-01-05 NOTE — TELEPHONE ENCOUNTER
Pt is requesting verapamil refill be sent to [facility] pharmacy on file  Rx entered   Pls review and sign off

## 2021-01-06 DIAGNOSIS — G43.719 INTRACTABLE CHRONIC MIGRAINE WITHOUT AURA AND WITHOUT STATUS MIGRAINOSUS: ICD-10-CM

## 2021-01-06 RX ORDER — RIZATRIPTAN BENZOATE 5 MG/1
TABLET, ORALLY DISINTEGRATING ORAL
Qty: 9 TABLET | Refills: 2 | OUTPATIENT
Start: 2021-01-06

## 2021-01-06 NOTE — TELEPHONE ENCOUNTER
Pt calling in stating she needs a new script for the syringes for toradol  Per 11/27 script was sent for syringes with the 3 refills    I called pharmacy and she states it is avail and has refills  She will get it ready for patient  Left detailed message for patient making her aware

## 2021-01-12 ENCOUNTER — TELEPHONE (OUTPATIENT)
Dept: NEUROLOGY | Facility: CLINIC | Age: 71
End: 2021-01-12

## 2021-01-12 NOTE — TELEPHONE ENCOUNTER
pt called and states that she started having dizziness saturday  last thursday or friday she states that she increased her verapamil 80mg to 2 tabs q 12  instead of 1 tab q12   she has been taking 2 tabs q 12 since last thursday  dizziness was constant and lasted pretty much all day  she states that she had dizziness, since saturday and some still today    she states that she only took 1 tab this am     please advise  [phone] to leave a detialed message

## 2021-01-12 NOTE — TELEPHONE ENCOUNTER
Please continue with 80 mg q12 hours only  What was her BP reading? I thought we increased to this dose in the past?  Thanks

## 2021-01-13 NOTE — TELEPHONE ENCOUNTER
Left patient message informing her to continue taking 80 mg q 12 hours  I also instructed her to return call to office to discuss further questions

## 2021-01-15 DIAGNOSIS — F51.01 PRIMARY INSOMNIA: ICD-10-CM

## 2021-01-15 RX ORDER — SAW/PYGEUM/BETA/HERB/D3/B6/ZN 30 MG-25MG
CAPSULE ORAL
Qty: 30 TABLET | Refills: 5 | Status: SHIPPED | OUTPATIENT
Start: 2021-01-15 | End: 2021-12-07 | Stop reason: SDUPTHER

## 2021-01-15 NOTE — TELEPHONE ENCOUNTER
No she has been on verapamil for a long time and this has never happened  Please ask her to come in, or go to PCP to check bp or go to ED to check it  Is dizziness lightheadedness or room spinning? Did she start any other meds? Does she have allergies?

## 2021-01-15 NOTE — TELEPHONE ENCOUNTER
pt called back and states that she has been taking 80mg  1 bid since tuesday  still having dizziness  Still constant  she states that she doesn't check her bp at home  she would have to go to a dr to do that     she also states that she had a bone scan on 1/13 at [facility] that showed degenerative disc disease  She is asking if she can come completely off verapamil and try an alternative  She is asking that this be addressed today      Please advise  [phone] to leave a detialed message

## 2021-01-15 NOTE — TELEPHONE ENCOUNTER
Patient left  stating that she is still waiting to hear from us regarding her dizziness  She gave the call back number of [phone]  Called patient and it went straight to voicemail  Left detailed message with instructions as well as sent mychart message

## 2021-01-16 ENCOUNTER — APPOINTMENT (EMERGENCY)
Dept: RADIOLOGY | Facility: HOSPITAL | Age: 71
End: 2021-01-16
Payer: MEDICARE

## 2021-01-16 ENCOUNTER — HOSPITAL ENCOUNTER (EMERGENCY)
Facility: HOSPITAL | Age: 71
Discharge: HOME | End: 2021-01-16
Attending: EMERGENCY MEDICINE | Admitting: EMERGENCY MEDICINE
Payer: MEDICARE

## 2021-01-16 VITALS
RESPIRATION RATE: 18 BRPM | OXYGEN SATURATION: 96 % | WEIGHT: 179.23 LBS | SYSTOLIC BLOOD PRESSURE: 171 MMHG | TEMPERATURE: 98.9 F | HEART RATE: 87 BPM | BODY MASS INDEX: 29.83 KG/M2 | DIASTOLIC BLOOD PRESSURE: 85 MMHG

## 2021-01-16 DIAGNOSIS — J06.9 URI (UPPER RESPIRATORY INFECTION): Primary | ICD-10-CM

## 2021-01-16 DIAGNOSIS — Z20.822 COVID-19 VIRUS TEST RESULT UNKNOWN: ICD-10-CM

## 2021-01-16 PROCEDURE — 99285 EMERGENCY DEPT VISIT HI MDM: CPT | Performed by: EMERGENCY MEDICINE

## 2021-01-16 PROCEDURE — U0005 INFEC AGEN DETEC AMPLI PROBE: HCPCS | Performed by: EMERGENCY MEDICINE

## 2021-01-16 PROCEDURE — 99284 EMERGENCY DEPT VISIT MOD MDM: CPT

## 2021-01-16 PROCEDURE — 71045 X-RAY EXAM CHEST 1 VIEW: CPT

## 2021-01-16 PROCEDURE — U0003 INFECTIOUS AGENT DETECTION BY NUCLEIC ACID (DNA OR RNA); SEVERE ACUTE RESPIRATORY SYNDROME CORONAVIRUS 2 (SARS-COV-2) (CORONAVIRUS DISEASE [COVID-19]), AMPLIFIED PROBE TECHNIQUE, MAKING USE OF HIGH THROUGHPUT TECHNOLOGIES AS DESCRIBED BY CMS-2020-01-R: HCPCS | Performed by: EMERGENCY MEDICINE

## 2021-01-16 RX ORDER — BENZONATATE 100 MG/1
100 CAPSULE ORAL 3 TIMES DAILY PRN
Qty: 21 CAPSULE | Refills: 0 | Status: SHIPPED | OUTPATIENT
Start: 2021-01-16 | End: 2021-01-23

## 2021-01-16 RX ORDER — AZITHROMYCIN 250 MG/1
TABLET, FILM COATED ORAL
Qty: 6 TABLET | Refills: 0 | Status: SHIPPED | OUTPATIENT
Start: 2021-01-16 | End: 2021-01-20

## 2021-01-16 NOTE — ED NOTES
While attempting to obtain specimen pt pulled at this writers arm preventing me from obtaining a proper specimen in her left nare, pt yelling at this writer that I caused her pain, asking if she can collect her own specimen, pt states "I collected my own last time and it was negative, I can do it fine", pt informed that I cannot allow her to collect her own specimen, pt agreeable to allowing me to swab her right nare, but not her left, pt made aware that this may cause a false result on her test due to insufficient amount of specimen, pt verbalizes understanding and continues to refuse left nare to be swabbed        Kosta Lino RN  01/16/21 [id number]

## 2021-01-16 NOTE — DISCHARGE INSTRUCTIONS
Medications that may help reduce severity of infection, these can be purchased over the counter:    -Vitamin D3 2000 IU (international units) Daily    -Vitamin C 1g every 12 hours    -Multivitamin Daily     Please call your pcp as soon as possible to schedule an appt to see if you are candidate for monoclonal antibody therapy if your covid test comes back positive

## 2021-01-16 NOTE — ED PROVIDER NOTES
History  Chief Complaint   Patient presents with    Cough     pt c/o cough for 4 days, reports last night chest tightness and SOB  Recent COVID test on New Years day, result negative  reports fever last night  History provided by:  Patient  Flu Symptoms  Presenting symptoms: cough, fever, headache and shortness of breath    Presenting symptoms: no diarrhea, no fatigue, no myalgias, no nausea, no rhinorrhea, no sore throat and no vomiting    Cough:     Cough characteristics:  Productive    Sputum characteristics:  Unable to specify    Duration:  2 days  Fever:     Duration:  2 days  Headaches:     Severity:  Moderate    Onset quality:  Gradual    Duration:  2 days  Severity:  Moderate  Onset quality:  Gradual  Duration:  2 days  Progression:  Unchanged  Chronicity:  New  Relieved by:  Nothing  Worsened by:  Nothing  Ineffective treatments:  None tried  Associated symptoms: chills    Associated symptoms: no decreased appetite, no decrease in physical activity, no ear pain, no mental status change, no congestion and no neck stiffness        Prior to Admission Medications   Prescriptions Last Dose Informant Patient Reported? Taking? ACETAMINOPHEN-BUTALBITAL  MG TABS   No No   Si tab at migraine onset, repeat after 4 hours if needed  Max 2 per day and 3 per week     [facility] Vitamin B-12 1000 MCG tablet   No No   Sig: TAKE 1 TABLET BY MOUTH EVERY DAY   Cyanocobalamin (B-12) 1000 MCG/ML KIT  Self No No   Sig: Inject 1 ml IM once monthly   Galcanezumab-gnlm (Emgality) 120 MG/ML SOAJ   No No   Sig: One ml subcutaneous on the right thigh and 1 ml subcutaneous on the left thigh at the same time for 1 dose   LORazepam (ATIVAN) 2 mg tablet  Self Yes No   Sig: Take 2 mg by mouth 3 (three) times a day as needed   Melatonin ER 10 MG TBCR   No No   Sig: TAKE 1 TABLET BY MOUTH EVERYDAY AT BEDTIME   OLANZapine (ZyPREXA) 2 5 mg tablet  Self No No   Sig: TAKE 1 TAB AT BEDTIME AS NEEDED MIGRAINE   Patient not taking: Reported on 2020   SYRINGE-NEEDLE, DISP, 3 ML (BD Integra Syringe) 25G X 1" 3 ML MISC   No No   Sig: USE FOR TORADOL INJECTION AS NEEDED FOR SEVERE HEADACHE   azelastine (ASTELIN) 0 1 % nasal spray  Self Yes No   Si sprays into each nostril 2 (two) times a day Use in each nostril as directed   bisoprolol (ZEBETA) 5 mg tablet  Self Yes No   Sig: Take 5 mg by mouth daily   buPROPion (WELLBUTRIN XL) 300 mg 24 hr tablet  Self No No   Sig: Take 1 tablet (300 mg total) by mouth daily   cetirizine (ZyrTEC) 10 mg tablet  Self Yes No   Sig: Take 10 mg by mouth daily   dicyclomine (BENTYL) 10 mg capsule  Self Yes No   Sig: Take 10 mg by mouth 3 (three) times a day as needed   fluticasone (FLONASE) 50 mcg/act nasal spray  Self Yes No   Si sprays into each nostril daily   gabapentin (NEURONTIN) 300 mg capsule   No No   Sig: Take 2 tabs nightly x 1 wk, then 1 tab AM and 2 tabs nightly x 1 wk, then 1 tab AM, 1 tab afternoon and 2 tabs nightly increase as needed/tolerated   ketorolac (TORADOL) 30 mg/mL injection   No No   Sig: PLEASE SEE ATTACHED FOR DETAILED DIRECTIONS   oxyCODONE (OxyCONTIN) 10 mg 12 hr tablet  Self Yes No   Sig: Take 10 mg by mouth every 8 (eight) hours   promethazine (PHENERGAN) 25 mg tablet   No No   Sig: Take 1 tab every 8 hours for migraine/nausea as needed max three days a week   rOPINIRole (REQUIP) 1 mg tablet  Self Yes No   Sig: Take 4 mg by mouth daily    rizatriptan (MAXALT-MLT) 10 MG disintegrating tablet   No No   Si tab at migraine onset, repeat after 2 hours if needed; Max 2 per day and 3 per week  tiZANidine (ZANAFLEX) 2 mg tablet   No No   Sig: TAKE 1 TABLET BY MOUTH AT BEDTIME AS NEEDED FOR NECK PAIN   verapamil (CALAN) 80 mg tablet   No No   Si tabs q12 hours        Facility-Administered Medications Last Administration Doses Remaining   prochlorperazine (COMPAZINE) injection 5 mg None recorded           Past Medical History:   Diagnosis Date    Allergic rhinitis     Anxiety     Arthritis     Cancer ([facility] [address] )     Skin     Chronic pain disorder     knee and back    Chronic prescription opiate use     Depression     GERD (gastroesophageal reflux disease)     Hypertension     Irritable bowel syndrome     Migraines     Spinal stenosis        Past Surgical History:   Procedure Laterality Date    CHOLECYSTECTOMY      COLONOSCOPY      MS REV UPPER EYELID W EXCESS SKIN Bilateral 1/29/2020    Procedure: BLEPHAROPLASTY UPPER;  Surgeon: Johanna Ramirez MD;  Location: [facility] MAIN OR;  Service: Plastics    ROTATOR CUFF REPAIR Bilateral        Family History   Problem Relation Age of Onset    Breast cancer Mother     Alzheimer's disease Father     Depression Sister      I have reviewed and agree with the history as documented  E-Cigarette/Vaping    E-Cigarette Use Never User      E-Cigarette/Vaping Substances     Social History     Tobacco Use    Smoking status: Current Every Day Smoker     Packs/day: 0 50     Types: Cigarettes    Smokeless tobacco: Never Used   Substance Use Topics    Alcohol use: Yes     Comment: occ    Drug use: No       Review of Systems   Constitutional: Positive for chills and fever  Negative for activity change, appetite change, decreased appetite and fatigue  HENT: Negative for congestion, dental problem, ear pain, rhinorrhea and sore throat  Eyes: Negative for pain and redness  Respiratory: Positive for cough, chest tightness and shortness of breath  Negative for wheezing  Cardiovascular: Negative for chest pain, palpitations and leg swelling  Gastrointestinal: Negative for abdominal pain, blood in stool, constipation, diarrhea, nausea and vomiting  Endocrine: Negative for cold intolerance and heat intolerance  Genitourinary: Negative for dysuria, frequency and hematuria  Musculoskeletal: Negative for arthralgias, myalgias, neck pain and neck stiffness  Skin: Negative for color change, pallor and rash     Neurological: Positive for headaches  Negative for dizziness, facial asymmetry, weakness, light-headedness and numbness  Hematological: Does not bruise/bleed easily  Psychiatric/Behavioral: Negative for agitation, hallucinations and suicidal ideas  Physical Exam  Physical Exam  Constitutional:       Appearance: She is well-developed  HENT:      Head: Normocephalic and atraumatic  Eyes:      Extraocular Movements: Extraocular movements intact  Pupils: Pupils are equal, round, and reactive to light  Neck:      Musculoskeletal: Normal range of motion and neck supple  Vascular: No JVD  Trachea: No tracheal deviation  Cardiovascular:      Rate and Rhythm: Normal rate and regular rhythm  Pulses: Normal pulses  Pulmonary:      Effort: Pulmonary effort is normal  No tachypnea, accessory muscle usage or respiratory distress  Breath sounds: Normal breath sounds  Abdominal:      General: There is no distension  Tenderness: There is no abdominal tenderness  There is no right CVA tenderness or left CVA tenderness  Musculoskeletal:      Right lower leg: Normal       Left lower leg: Normal    Skin:     General: Skin is warm  Capillary Refill: Capillary refill takes less than 2 seconds  Neurological:      Mental Status: She is alert and oriented to person, place, and time     Psychiatric:         Behavior: Behavior normal          Vital Signs  ED Triage Vitals [01/16/21 0822]   Temperature Pulse Respirations Blood Pressure SpO2   98 9 °F (37 2 °C) 87 18 (!) 171/85 96 %      Temp Source Heart Rate Source Patient Position - Orthostatic VS BP Location FiO2 (%)   Oral Monitor Sitting Right arm --      Pain Score       --           Vitals:    01/16/21 0822   BP: (!) 171/85   Pulse: 87   Patient Position - Orthostatic VS: Sitting         Visual Acuity      ED Medications  Medications - No data to display    Diagnostic Studies  Results Reviewed     Procedure Component Value Units Date/Time    Novel Coronavirus ([id number]), PCR [facility] [[id number]] Collected: 01/16/21 0926    Lab Status: In process Specimen: Nasopharyngeal Swab Updated: 01/16/21 0931                 XR chest 1 view portable   ED Interpretation by Yunier Hernandez MD (01/16 6221)   Primary reviewed: no acute abnormality      Final Result by Hilda Naylor MD (01/16 1948)      No acute cardiopulmonary disease  Workstation performed: [id number]                    Procedures  Procedures         ED Course  ED Course as of Jan 16 1034   Sat Jan 16, 2021   4637 Patient's ambulatory pulse ox within normal limits  Patient will be discharged home with treatment for presumed COVID  Patient instructed to follow-up with her PCP to see she is a candidate for monoclonal antibody therapy  Return precautions given understood                                SBIRT 20yo+      Most Recent Value   SBIRT (22 yo +)   In order to provide better care to our patients, we are screening all of our patients for alcohol and drug use  Would it be okay to ask you these screening questions?   No Filed at: 01/16/2021 0934                    MDM  Number of Diagnoses or Management Options  COVID-19 virus test result unknown:   URI (upper respiratory infection):   Diagnosis management comments: URI symptoms with benign exam, normal vital signs-will do chest x-ray, pneumothorax pulse oximeter COVID test      Disposition  Final diagnoses:   URI (upper respiratory infection)   COVID-19 virus test result unknown     Time reflects when diagnosis was documented in both MDM as applicable and the Disposition within this note     Time User Action Codes Description Comment    1/16/2021  9:41 AM Wilmar Blake Add [J06 9] URI (upper respiratory infection)     1/16/2021  9:41 AM Pablo Abdul Add [Z20 [id number]] COVID-19 virus test result unknown       ED Disposition     ED Disposition Condition Date/Time Comment    Discharge Stable Sat Jan 16, 2021  9:39 AM Angelina Felty Hessinger discharge to home/self care  Follow-up Information     Follow up With Specialties Details Why Contact Info    Elizabeth Drake, DO Internal Medicine Schedule an appointment as soon as possible for a visit in 2 days  [address] [place] [address]  [phone]            Discharge Medication List as of 1/16/2021  9:46 AM      START taking these medications    Details   azithromycin (Zithromax Z-Phan) 250 mg tablet Take 2 tabs x 1 day, then 1 tab x 4 days, Normal      benzonatate (TESSALON PERLES) 100 mg capsule Take 1 capsule (100 mg total) by mouth 3 (three) times a day as needed for cough for up to 7 days, Starting Sat 1/16/2021, Until Sat 1/23/2021, Normal         CONTINUE these medications which have NOT CHANGED    Details   ACETAMINOPHEN-BUTALBITAL  MG TABS 1 tab at migraine onset, repeat after 4 hours if needed   Max 2 per day and 3 per week , Normal      azelastine (ASTELIN) 0 1 % nasal spray 2 sprays into each nostril 2 (two) times a day Use in each nostril as directed, Historical Med      bisoprolol (ZEBETA) 5 mg tablet Take 5 mg by mouth daily, Historical Med      buPROPion (WELLBUTRIN XL) 300 mg 24 hr tablet Take 1 tablet (300 mg total) by mouth daily, Starting Tue 3/24/2020, Normal      cetirizine (ZyrTEC) 10 mg tablet Take 10 mg by mouth daily, Historical Med      [facility] Vitamin B-12 1000 MCG tablet TAKE 1 TABLET BY MOUTH EVERY DAY, Normal      Cyanocobalamin (B-12) 1000 MCG/ML KIT Inject 1 ml IM once monthly, Normal      dicyclomine (BENTYL) 10 mg capsule Take 10 mg by mouth 3 (three) times a day as needed, Historical Med      fluticasone (FLONASE) 50 mcg/act nasal spray 2 sprays into each nostril daily, Historical Med      gabapentin (NEURONTIN) 300 mg capsule Take 2 tabs nightly x 1 wk, then 1 tab AM and 2 tabs nightly x 1 wk, then 1 tab AM, 1 tab afternoon and 2 tabs nightly increase as needed/tolerated, Normal      Galcanezumab-gnlm (Emgality) 120 MG/ML SOAJ One ml subcutaneous on the right thigh and 1 ml subcutaneous on the left thigh at the same time for 1 dose, Normal      ketorolac (TORADOL) 30 mg/mL injection PLEASE SEE ATTACHED FOR DETAILED DIRECTIONS, Normal      LORazepam (ATIVAN) 2 mg tablet Take 2 mg by mouth 3 (three) times a day as needed, Starting Wed 7/29/2020, Historical Med      Melatonin ER 10 MG TBCR TAKE 1 TABLET BY MOUTH EVERYDAY AT BEDTIME, Normal      OLANZapine (ZyPREXA) 2 5 mg tablet TAKE 1 TAB AT BEDTIME AS NEEDED MIGRAINE, Normal      oxyCODONE (OxyCONTIN) 10 mg 12 hr tablet Take 10 mg by mouth every 8 (eight) hours, Historical Med      promethazine (PHENERGAN) 25 mg tablet Take 1 tab every 8 hours for migraine/nausea as needed max three days a week, Normal      rizatriptan (MAXALT-MLT) 10 MG disintegrating tablet 1 tab at migraine onset, repeat after 2 hours if needed; Max 2 per day and 3 per week , Normal      rOPINIRole (REQUIP) 1 mg tablet Take 4 mg by mouth daily , Historical Med      SYRINGE-NEEDLE, DISP, 3 ML (BD Integra Syringe) 25G X 1" 3 ML MISC USE FOR TORADOL INJECTION AS NEEDED FOR SEVERE HEADACHE, Normal      tiZANidine (ZANAFLEX) 2 mg tablet TAKE 1 TABLET BY MOUTH AT BEDTIME AS NEEDED FOR NECK PAIN, Normal      verapamil (CALAN) 80 mg tablet 2 tabs q12 hours  , Normal           No discharge procedures on file      PDMP Review     None          ED Provider  Electronically Signed by           Mica Waldron MD  01/16/21 0641

## 2021-01-17 LAB — SARS-COV-2 RNA SPEC QL NAA+PROBE: NOT DETECTED

## 2021-01-17 NOTE — RESULT ENCOUNTER NOTE
Spoke with patient advised her that her test was negative for COVID  States she still does headache cough and congestion  I explained to her that even though her test is negative it is not very specific and a negative test may not be negative  Due the fact she has COVID like symptoms she should remain in quarantine  Patient states that she always has the symptoms  Never  The less she was advised to treat her like she has COVID

## 2021-01-27 ENCOUNTER — TELEPHONE (OUTPATIENT)
Dept: NEUROLOGY | Facility: CLINIC | Age: 71
End: 2021-01-27

## 2021-01-27 NOTE — TELEPHONE ENCOUNTER
Patient calling for a refill of her melatonin  Advised that a new script was sent on 1/15 and should be available [facility]  Patient states understanding and will reach out to them  She will call if there are any issues

## 2021-02-04 ENCOUNTER — TELEPHONE (OUTPATIENT)
Dept: NEUROLOGY | Facility: CLINIC | Age: 71
End: 2021-02-04

## 2021-02-04 NOTE — TELEPHONE ENCOUNTER
ALISON to see if she is interested in moving her appt on 2/17/2021 12:15PM to earlier opening with [facility] on Thursday, 2/11/2021 11AM at [facility]

## 2021-02-05 ENCOUNTER — DOCUMENTATION (OUTPATIENT)
Dept: NEUROLOGY | Facility: CLINIC | Age: 71
End: 2021-02-05

## 2021-02-05 NOTE — PROGRESS NOTES
Type Date User Summary Attachment   General 01/29/2021 11:16 AM Seg[facility] care coordination -   Note    Botox - no authorization needed    Please use our stock      Thanks  Marleni Sheppard

## 2021-02-10 NOTE — TELEPHONE ENCOUNTER
LMOM if interested in earlier appt with [address] tomorrow 2/11/2021 11AM  If patient accepts appt, please transfer to [facility] so registration and covid screening must be completed  Please advise of all office policies

## 2021-02-11 DIAGNOSIS — G43.719 INTRACTABLE CHRONIC MIGRAINE WITHOUT AURA AND WITHOUT STATUS MIGRAINOSUS: ICD-10-CM

## 2021-02-11 NOTE — TELEPHONE ENCOUNTER
Patient called for refill of acetaminophen-butalbital  Please sign off if agreeable        [facility]

## 2021-02-12 RX ORDER — BUTALBITAL/ACETAMINOPHEN 50MG-325MG
TABLET ORAL
Qty: 10 TABLET | Refills: 2 | Status: SHIPPED | OUTPATIENT
Start: 2021-02-12 | End: 2021-02-17 | Stop reason: SDUPTHER

## 2021-02-16 ENCOUNTER — TELEPHONE (OUTPATIENT)
Dept: NEUROLOGY | Facility: CLINIC | Age: 71
End: 2021-02-16

## 2021-02-17 ENCOUNTER — OFFICE VISIT (OUTPATIENT)
Dept: NEUROLOGY | Facility: CLINIC | Age: 71
End: 2021-02-17
Payer: MEDICARE

## 2021-02-17 VITALS
HEART RATE: 98 BPM | SYSTOLIC BLOOD PRESSURE: 137 MMHG | WEIGHT: 177.8 LBS | BODY MASS INDEX: 29.62 KG/M2 | TEMPERATURE: 98.1 F | HEIGHT: 65 IN | DIASTOLIC BLOOD PRESSURE: 81 MMHG

## 2021-02-17 DIAGNOSIS — G25.81 RESTLESS LEG SYNDROME: ICD-10-CM

## 2021-02-17 DIAGNOSIS — G43.719 INTRACTABLE CHRONIC MIGRAINE WITHOUT AURA AND WITHOUT STATUS MIGRAINOSUS: Primary | ICD-10-CM

## 2021-02-17 DIAGNOSIS — M79.18 MYOFASCIAL MUSCLE PAIN: ICD-10-CM

## 2021-02-17 PROCEDURE — 20552 NJX 1/MLT TRIGGER POINT 1/2: CPT | Performed by: PHYSICIAN ASSISTANT

## 2021-02-17 PROCEDURE — 99215 OFFICE O/P EST HI 40 MIN: CPT | Performed by: PHYSICIAN ASSISTANT

## 2021-02-17 RX ORDER — BUPIVACAINE HYDROCHLORIDE 2.5 MG/ML
5 INJECTION, SOLUTION EPIDURAL; INFILTRATION; INTRACAUDAL ONCE
Status: COMPLETED | OUTPATIENT
Start: 2021-02-17 | End: 2021-02-17

## 2021-02-17 RX ORDER — BENZONATATE 200 MG/1
CAPSULE ORAL
COMMUNITY
Start: 2021-01-15 | End: 2021-09-09

## 2021-02-17 RX ORDER — CYANOCOBALAMIN 1000 UG/ML
INJECTION INTRAMUSCULAR; SUBCUTANEOUS
COMMUNITY
Start: 2020-12-21 | End: 2021-02-23

## 2021-02-17 RX ORDER — LIDOCAINE 50 MG/G
OINTMENT TOPICAL AS NEEDED
Qty: 35.44 G | Refills: 0 | Status: SHIPPED | OUTPATIENT
Start: 2021-02-17 | End: 2021-09-09

## 2021-02-17 RX ORDER — BUSPIRONE HYDROCHLORIDE 15 MG/1
TABLET ORAL
COMMUNITY
Start: 2021-02-10 | End: 2021-09-09

## 2021-02-17 RX ORDER — BUTALBITAL/ACETAMINOPHEN 50MG-325MG
TABLET ORAL
Qty: 20 TABLET | Refills: 0 | Status: SHIPPED | OUTPATIENT
Start: 2021-02-17 | End: 2022-03-14 | Stop reason: SDUPTHER

## 2021-02-17 RX ORDER — DULOXETIN HYDROCHLORIDE 30 MG/1
CAPSULE, DELAYED RELEASE ORAL DAILY
COMMUNITY
Start: 2020-10-05 | End: 2021-08-10 | Stop reason: ALTCHOICE

## 2021-02-17 RX ORDER — ALBUTEROL SULFATE 90 UG/1
AEROSOL, METERED RESPIRATORY (INHALATION)
COMMUNITY
Start: 2021-01-26 | End: 2021-09-09

## 2021-02-17 RX ORDER — BACLOFEN 10 MG/1
10 TABLET ORAL 3 TIMES DAILY PRN
COMMUNITY
Start: 2020-12-21 | End: 2021-08-10 | Stop reason: ALTCHOICE

## 2021-02-17 RX ORDER — OXYCODONE HYDROCHLORIDE AND ACETAMINOPHEN 5; 325 MG/1; MG/1
TABLET ORAL
COMMUNITY
Start: 2021-01-21 | End: 2021-09-09

## 2021-02-17 RX ORDER — GALCANEZUMAB 120 MG/ML
INJECTION, SOLUTION SUBCUTANEOUS
Qty: 1 PEN | Refills: 11 | Status: SHIPPED | OUTPATIENT
Start: 2021-02-17 | End: 2021-04-26

## 2021-02-17 RX ORDER — MONTELUKAST SODIUM 10 MG/1
10 TABLET ORAL EVERY EVENING
COMMUNITY
Start: 2020-12-01

## 2021-02-17 RX ORDER — OLOPATADINE HYDROCHLORIDE 1 MG/ML
SOLUTION/ DROPS OPHTHALMIC
COMMUNITY
Start: 2021-01-15

## 2021-02-17 RX ADMIN — BUPIVACAINE HYDROCHLORIDE 5 ML: 2.5 INJECTION, SOLUTION EPIDURAL; INFILTRATION; INTRACAUDAL at 13:23

## 2021-02-17 NOTE — PROGRESS NOTES
Patient ID: Amanda Stover is a 72 y o  female  Assessment/Plan:    Intractable chronic migraine without aura and without status migrainosus  Migraine headaches are improved with a combination of Botox in Emgality injectable      Since starting botox, the patient reports greater than 7 days of migraine relief from baseline, correlated with headache diary, decreased abortive medication use and decreased ER visits      Continue trigger point injections on an emergent basis      Continue verapamil, gabapentin, cymbalta for migraine prevention      P r n  Migraine:  Fioricet (without the caffeine); Can take Maxalt but no more than 3 per week to prevent medication overuse headache and vasoconstrictive effects  Add phenergan prn nausea  If maxalt fails, can use nurtec but not within same day at [address]  S/e reviewed  Provided conservative options today including menthol cream, lidocaine cream for neck  Diagnoses and all orders for this visit:    Intractable chronic migraine without aura and without status migrainosus  -     Rimegepant Sulfate (NURTEC) 75 MG TBDP; 1 tab at migraine onset  No more than one dose per 24 hours  Hold triptan  -     lidocaine (XYLOCAINE) 5 % ointment; Apply topically as needed for mild pain  -     Menthol, Topical Analgesic, 16 % LIQD; Spray on skin prior to injection  -     Galcanezumab-gnlm (Emgality) 120 MG/ML SOAJ; 1 injection monthly  -     ACETAMINOPHEN-BUTALBITAL  MG TABS; 1 tab at migraine onset, repeat after 4 hours if needed  Max 2 per day and 3 per week      Myofascial muscle pain  -     Menthol, Topical Analgesic, 16 % LIQD; Spray on skin prior to injection  -     Trigger Injection 1 or 2 muscles  -     bupivacaine (PF) (MARCAINE) 0 25 % injection 5 mL    Restless leg syndrome    Other orders  -     DULoxetine (CYMBALTA) 30 mg delayed release capsule; daily  -     albuterol (PROVENTIL HFA,VENTOLIN HFA) 90 mcg/act inhaler; TAKE 2 PUFFS BY MOUTH EVERY 4 TO 6 HOURS AS NEEDED  -     baclofen 10 mg tablet; Take 10 mg by mouth 3 (three) times a day as needed  -     benzonatate (TESSALON) 200 MG capsule; TAKE 1 CAPSULE BY MOUTH THREE TIMES A DAY AS NEEDED FOR COUGH  -     busPIRone (BUSPAR) 15 mg tablet  -     montelukast (SINGULAIR) 10 mg tablet; Take 10 mg by mouth every evening  -     olopatadine (PATANOL) 0 1 % ophthalmic solution; INSTILL ONE DROP INTO INTO BOTH EYES AS NEEDED  -     oxyCODONE-acetaminophen (PERCOCET) 5-325 mg per tablet; PLEASE SEE ATTACHED FOR DETAILED DIRECTIONS  -     cyanocobalamin 1,000 mcg/mL; INJECT 1 ML IM ONCE MONTHLY       The patient should not hesitate to call me prior to her follow up with any questions or concerns  Subjective:    HPI     She continues Botox Q 90 days for migraine prevention  Since starting botox, the patient reports greater than 7 days of migraine relief from baseline, correlated with headache diary, decreased abortive medication use and decreased ER visits  Trigger point injections were very helpful in the past and she is requesting these today on an emergent basis  She is also taking Emgality currently with no significant side effects, and this has further reduced migraine headaches in combination with Botox  Per last Botox note:  Increased dose of gabapentin was not helpful for migraines  Promethazine helpful  Toradol p o  not helpful, but injectable helpful  Maxalt is helpful but asking for increased dose higher than 10 mg, which obviously is not possible  Fioricet helps  Tizanidine was not helpful  Ativan is helpful for migraines and she takes this p r n  per a different provider  Considering Lamictal    She continues a combination of Verapamil, Cymbalta, gabapentin, baclofen and olanzapine  She [facility] with [facility]ropract[facility] for neck pain  She continues seeing Psychiatry at [facility] and therapy once weekly      The following portions of the patient's history were reviewed and updated as appropriate:   She  has a past medical history of Allergic rhinitis, Anxiety, Arthritis, Cancer ([facility] [address] ), Chronic pain disorder, Chronic prescription opiate use, Depression, GERD (gastroesophageal reflux disease), Hypertension, Irritable bowel syndrome, Migraines, and Spinal stenosis  She   Patient Active Problem List    Diagnosis Date Noted    B12 deficiency 11/05/2020    Restless leg syndrome 11/05/2020    Iron deficiency 11/05/2020    Myofascial muscle pain 09/25/2020    Myofascial pain 08/10/2020    Intractable chronic migraine without aura and without status migrainosus 08/04/2020    Intractable migraine without aura 07/30/2020    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    Primary insomnia 05/07/2019    Insomnia due to other mental disorder 04/09/2019    Anxiety and depression 01/10/2019    [facility] major [facility] ) 04/25/2018     She  has a past surgical history that includes Colonoscopy; Cholecystectomy; Rotator cuff repair (Bilateral); and pr rev upper eyelid w excess skin (Bilateral, 1/29/2020)  Her family history includes Alzheimer's disease in her father; Breast cancer in her mother; Depression in her sister  She  reports that she has been smoking cigarettes  She has been smoking about 0 50 packs per day  She has never used smokeless tobacco  She reports current alcohol use  She reports that she does not use drugs  Current Outpatient Medications   Medication Sig Dispense Refill    ACETAMINOPHEN-BUTALBITAL  MG TABS 1 tab at migraine onset, repeat after 4 hours if needed  Max 2 per day and 3 per week   20 tablet 0    albuterol (PROVENTIL HFA,VENTOLIN HFA) 90 mcg/act inhaler TAKE 2 PUFFS BY MOUTH EVERY 4 TO 6 HOURS AS NEEDED      azelastine (ASTELIN) 0 1 % nasal spray 2 sprays into each nostril 2 (two) times a day Use in each nostril as directed      baclofen 10 mg tablet Take 10 mg by mouth 3 (three) times a day as needed      benzonatate (TESSALON) 200 MG capsule TAKE 1 CAPSULE BY MOUTH THREE TIMES A DAY AS NEEDED FOR COUGH      bisoprolol (ZEBETA) 5 mg tablet Take 5 mg by mouth daily PRN      buPROPion (WELLBUTRIN XL) 300 mg 24 hr tablet Take 1 tablet (300 mg total) by mouth daily 30 tablet 5    busPIRone (BUSPAR) 15 mg tablet       cetirizine (ZyrTEC) 10 mg tablet Take 10 mg by mouth daily      [facility] Vitamin B-12 1000 MCG tablet TAKE 1 TABLET BY MOUTH EVERY DAY 30 tablet 5    Cyanocobalamin (B-12) 1000 MCG/ML KIT Inject 1 ml IM once monthly 1 kit 11    cyanocobalamin 1,000 mcg/mL INJECT 1 ML IM ONCE MONTHLY      dicyclomine (BENTYL) 10 mg capsule Take 10 mg by mouth 3 (three) times a day as needed PRN      DULoxetine (CYMBALTA) 30 mg delayed release capsule daily      fluticasone (FLONASE) 50 mcg/act nasal spray 2 sprays into each nostril daily      gabapentin (NEURONTIN) 300 mg capsule Take 2 tabs nightly x 1 wk, then 1 tab AM and 2 tabs nightly x 1 wk, then 1 tab AM, 1 tab afternoon and 2 tabs nightly increase as needed/tolerated 120 capsule 3    Galcanezumab-gnlm (Emgality) 120 MG/ML SOAJ 1 injection monthly 1 pen 11    LORazepam (ATIVAN) 2 mg tablet Take 2 mg by mouth 3 (three) times a day as needed      Melatonin ER 10 MG TBCR TAKE 1 TABLET BY MOUTH EVERYDAY AT BEDTIME 30 tablet 5    montelukast (SINGULAIR) 10 mg tablet Take 10 mg by mouth every evening      OLANZapine (ZyPREXA) 2 5 mg tablet TAKE 1 TAB AT BEDTIME AS NEEDED MIGRAINE 5 tablet 0    olopatadine (PATANOL) 0 1 % ophthalmic solution INSTILL ONE DROP INTO INTO BOTH EYES AS NEEDED      oxyCODONE (OxyCONTIN) 10 mg 12 hr tablet Take 10 mg by mouth every 8 (eight) hours      oxyCODONE-acetaminophen (PERCOCET) 5-325 mg per tablet PLEASE SEE ATTACHED FOR DETAILED DIRECTIONS      promethazine (PHENERGAN) 25 mg tablet Take 1 tab every 8 hours for migraine/nausea as needed max three days a week 30 tablet 2    rizatriptan (MAXALT-MLT) 10 MG disintegrating tablet 1 tab at migraine onset, repeat after 2 hours if needed; Max 2 per day and 3 per week  12 tablet 2    rOPINIRole (REQUIP) 1 mg tablet Take 4 mg by mouth daily       SYRINGE-NEEDLE, DISP, 3 ML (BD Integra Syringe) 25G X 1" 3 ML MISC USE FOR TORADOL INJECTION AS NEEDED FOR SEVERE HEADACHE 4 each 3    verapamil (CALAN) 80 mg tablet 2 tabs q12 hours  180 tablet 0    ketorolac (TORADOL) 30 mg/mL injection PLEASE SEE ATTACHED FOR DETAILED DIRECTIONS (Patient not taking: Reported on 2/17/2021) 4 mL 1    lidocaine (XYLOCAINE) 5 % ointment Apply topically as needed for mild pain 35 44 g 0    Menthol, Topical Analgesic, 16 % LIQD Spray on skin prior to injection 1 Bottle 2    Rimegepant Sulfate (NURTEC) 75 MG TBDP 1 tab at migraine onset  No more than one dose per 24 hours  Hold triptan  10 tablet 0    tiZANidine (ZANAFLEX) 2 mg tablet TAKE 1 TABLET BY MOUTH AT BEDTIME AS NEEDED FOR NECK PAIN (Patient not taking: Reported on 2/17/2021) 30 tablet 0     Current Facility-Administered Medications   Medication Dose Route Frequency Provider Last Rate Last Admin    prochlorperazine (COMPAZINE) injection 5 mg  5 mg Intramuscular Q4H PRN Abdulaziz Guardado MD         She is allergic to metaxalone; codeine; morphine; other; eggs or egg-derived products; and tapentadol            Objective:    Blood pressure 137/81, pulse 98, temperature 98 1 °F (36 7 °C), temperature source Oral, height 5' 5" (1 651 m), weight 80 6 kg (177 lb 12 8 oz)  Body mass index is 29 59 kg/m²  Physical Exam    Neurological Exam  On neurologic exam, the patient is alert and oriented to time and place  Speech is fluent and articulate, and the patient follows commands appropriately  Judgment and affect appear normal  Pupils are equally round and reactive to light and extraocular muscles are intact without nystagmus  Face is symmetric  Facial sensation is normal and symmetric, in all 3 divisions of the trigeminal nerve  Hearing is intact   Motor examination reveals intact strength throughout  Normal gait is steady  ROS:    Review of Systems   Constitutional: Negative  Negative for appetite change and fever  HENT: Negative  Negative for hearing loss, tinnitus, trouble swallowing and voice change  Eyes: Positive for photophobia and pain  Respiratory: Negative  Negative for shortness of breath  Cardiovascular: Negative  Negative for palpitations  Gastrointestinal: Positive for nausea  Endocrine: Negative  Negative for cold intolerance  Genitourinary: Negative  Negative for dysuria, frequency and urgency  Musculoskeletal: Positive for myalgias and neck pain  Skin: Negative  Negative for rash  Neurological: Positive for headaches  Negative for dizziness, tremors, seizures, syncope, facial asymmetry, speech difficulty, weakness, light-headedness and numbness  Patient states she has frequent migraines    Hematological: Negative  Does not bruise/bleed easily  Psychiatric/Behavioral: Negative  Negative for confusion, hallucinations and sleep disturbance  The following portions of the patient's history were reviewed and updated as appropriate: allergies, current medications/ medication history, past family history, past medical history, past social history, past surgical history and problem list     Review of systems was reviewed and otherwise unremarkable from a neurological perspective  Trigger Injection 1 or 2 muscles     Date/Time 2/17/2021 1:21 PM     Performed by  Dann Oconnor PA-C     Authorized by Dann Oconnor PA-C      Universal Protocol   Consent: The procedure was performed in an emergent situation  Verbal consent obtained  Risks and benefits: risks, benefits and alternatives were discussed  Consent given by: patient       Procedure Details   Procedure Notes: Trigger point injections were performed at the sites of maximal tenderness using 0 25% bupivacaine without epi, and without steroids    Injections were performed in the following locations with a 27G, 1 25" needle: R lower and upper traps, and L upper traps  This was well tolerated, and followed by moderate relief of pain  There were no complications; there was minimal bleeding  Patient tolerance: patient tolerated the procedure well with no immediate complications             I have spent 40 minutes with the patient today in which greater than 50% of this time was spent in counseling/coordination of care regarding diagnoses, test results, impression, plan and potential medication side effects

## 2021-02-18 ENCOUNTER — TELEPHONE (OUTPATIENT)
Dept: NEUROLOGY | Facility: CLINIC | Age: 71
End: 2021-02-18

## 2021-02-19 ENCOUNTER — TELEPHONE (OUTPATIENT)
Dept: NEUROLOGY | Facility: CLINIC | Age: 71
End: 2021-02-19

## 2021-02-19 NOTE — TELEPHONE ENCOUNTER
Called to remind patient of their upcoming appointment with Dr Stevie Castellanos in [facility]  Voicemail left to advise patient to call back with any urgent symptoms or concerns  Patient also made aware that we will be calling back two days prior to appointment to complete the COVID screening

## 2021-02-21 NOTE — ASSESSMENT & PLAN NOTE
Migraine headaches are improved with a combination of Botox in Emgality injectable      Since starting botox, the patient reports greater than 7 days of migraine relief from baseline, correlated with headache diary, decreased abortive medication use and decreased ER visits      Continue trigger point injections on an emergent basis      Continue verapamil, gabapentin, cymbalta for migraine prevention      P r n  Migraine:  Fioricet (without the caffeine); Can take Maxalt but no more than 3 per week to prevent medication overuse headache and vasoconstrictive effects  Add phenergan prn nausea  If maxalt fails, can use nurtec but not within same day at [address]  S/e reviewed  Provided conservative options today including menthol cream, lidocaine cream for neck

## 2021-02-22 DIAGNOSIS — G43.719 INTRACTABLE CHRONIC MIGRAINE WITHOUT AURA AND WITHOUT STATUS MIGRAINOSUS: ICD-10-CM

## 2021-02-22 DIAGNOSIS — E53.8 B12 DEFICIENCY: ICD-10-CM

## 2021-02-22 NOTE — TELEPHONE ENCOUNTER
Patient left voicemail on nursing line  Requesting refill of toradol and vitamin b12  Message left for patient to return call to office  Need to clarify if she needs refill of b12 oral or injection

## 2021-02-23 RX ORDER — KETOROLAC TROMETHAMINE 30 MG/ML
INJECTION, SOLUTION INTRAMUSCULAR; INTRAVENOUS
Qty: 4 ML | Refills: 1 | Status: SHIPPED | OUTPATIENT
Start: 2021-02-23 | End: 2021-06-22 | Stop reason: SDUPTHER

## 2021-02-23 NOTE — TELEPHONE ENCOUNTER
Received a call back from the patient- she states she is getting B12 injections  Please send refill to her pharmacy  I refilled the vitamin B12 injection that was on her profile but i'm not sure if it is the correct one- please veryify [address]  Also patient states she is due for an injection today and needs this as soon as possible and would like a rush on this to be completed      Thank you    Bessie Emmanuel

## 2021-02-24 DIAGNOSIS — G43.719 INTRACTABLE CHRONIC MIGRAINE WITHOUT AURA AND WITHOUT STATUS MIGRAINOSUS: ICD-10-CM

## 2021-03-01 ENCOUNTER — TELEPHONE (OUTPATIENT)
Dept: NEUROLOGY | Facility: CLINIC | Age: 71
End: 2021-03-01

## 2021-03-01 DIAGNOSIS — G43.719 INTRACTABLE CHRONIC MIGRAINE WITHOUT AURA AND WITHOUT STATUS MIGRAINOSUS: ICD-10-CM

## 2021-03-01 RX ORDER — PROMETHAZINE HYDROCHLORIDE 25 MG/1
TABLET ORAL
Qty: 30 TABLET | Refills: 2 | Status: SHIPPED | OUTPATIENT
Start: 2021-03-01

## 2021-03-01 NOTE — TELEPHONE ENCOUNTER
Called patient to discuss her MyChart message regarding the COVID vaccine  Left her a voicemail to return call to office to discuss this further as we are not offering the vaccine in office  Patient will have to reach out to PCP

## 2021-03-01 NOTE — TELEPHONE ENCOUNTER
Patient calling stating that she lost her prescription for rizatriptan  She is requesting refills, as she has no more at this time  I have queued this up for you if you are agreeable        TY

## 2021-03-01 NOTE — TELEPHONE ENCOUNTER
Patient returned call   Provided the information and advised patient on how to access the [facility] to [facility]

## 2021-03-01 NOTE — TELEPHONE ENCOUNTER
Patient sent in [employer] message below:    ----- Message from Red Woodward sent at 3/1/2021  8:20 AM EST -----  Regarding: FW: Pre-Procedure Testing Question  Contact: [phone]     ----- Message -----  From: Kailash Bauman  Sent: 2/28/2021   1:02 PM EST  To: Neurology [facility] Clinical  Subject: Pre-Procedure Anastasia Sanchez  I was hoping you could help me set ups covid shot  All I get is the run around  I'd like to use a [facility] location  I'm flexible with location I just need help to set it up   And    want to get it done ASAP Can u help me Ill be seeing u on Thursday and am looking forward to it  Thanks and see u soon  [phone]  ///26/1950  MARQUITA ATKINSON [facility]

## 2021-03-02 RX ORDER — RIZATRIPTAN BENZOATE 10 MG/1
TABLET, ORALLY DISINTEGRATING ORAL
Qty: 12 TABLET | Refills: 0 | Status: SHIPPED | OUTPATIENT
Start: 2021-03-02 | End: 2021-04-13

## 2021-03-03 NOTE — TELEPHONE ENCOUNTER
Pt left a msg at 4:35pm yesterday stating her rizatriptan was not refilled and requesting that this be done ASAP  I called patient back this morning however she did not answer  I left her a VM stating her rizatriptan was refilled yesterday 3/2 at 10:45am and was sent to [facility] in [place]  I asked her to call them to check on status of rx and call us back if she had any other questions

## 2021-03-04 ENCOUNTER — PROCEDURE VISIT (OUTPATIENT)
Dept: NEUROLOGY | Facility: CLINIC | Age: 71
End: 2021-03-04
Payer: MEDICARE

## 2021-03-04 VITALS
HEIGHT: 65 IN | BODY MASS INDEX: 29.66 KG/M2 | WEIGHT: 178 LBS | TEMPERATURE: 97.3 F | DIASTOLIC BLOOD PRESSURE: 84 MMHG | SYSTOLIC BLOOD PRESSURE: 146 MMHG

## 2021-03-04 DIAGNOSIS — G43.719 INTRACTABLE CHRONIC MIGRAINE WITHOUT AURA AND WITHOUT STATUS MIGRAINOSUS: Primary | ICD-10-CM

## 2021-03-04 PROCEDURE — 64615 CHEMODENERV MUSC MIGRAINE: CPT | Performed by: PSYCHIATRY & NEUROLOGY

## 2021-03-04 RX ORDER — RIZATRIPTAN BENZOATE 10 MG/1
10 TABLET, ORALLY DISINTEGRATING ORAL ONCE AS NEEDED
Qty: 9 TABLET | Refills: 3 | Status: SHIPPED | OUTPATIENT
Start: 2021-03-04 | End: 2021-04-08

## 2021-03-04 NOTE — PROGRESS NOTES
Chemodenervation     Date/Time 3/4/2021 12:30 PM     Performed by  Bianca Valadez DO     Authorized by Bianca Valadez DO        Pre-procedure details      Prepped With: Alcohol     Anesthesia  (see MAR for exact dosages): Anesthesia method:  None   Procedure details     Position:  Supine and upright   Botox     Botox Type:  Type A    Brand:  Botox    mL's of Botulinum Toxin:  200    mL's of preservative free sterile saline:  2    Final Concentration per CC:  100 units    Needle Gauge:  30 G 2 5 inch   Procedures     Botox Procedures: chronic headache      Indications: migraines     Injection Location      Head / Face:  L superior cervical paraspinal, R superior cervical paraspinal, L , R , R frontalis, L frontalis, L lateral occipitalis, R lateral occipitalis, procerus, R temporalis, L temporalis, L superior trapezius and R superior trapezius    L  injection amount:  5 unit(s)    R  injection amount:  5 unit(s)    L lateral frontalis:  5 unit(s)    R lateral frontalis:  5 unit(s)    L medial frontalis:  5 unit(s)    R medial frontalis:  5 unit(s)    L temporalis injection amount:  40 unit(s)    R temporalis injection amount:  40 unit(s)    Procerus injection amount:  5 unit(s)    L lateral occipitalis injection amount:  15 unit(s)    R lateral occipitalis injection amount:  15 unit(s)    L superior cervical paraspinal injection amount:  10 unit(s)    R superior cervical paraspinal injection amount:  10 unit(s)    L superior trapezius injection amount:  15 unit(s)    R superior trapezius injection amount:  20 unit(s)   Total Units     Total units used:  200    Total units discarded:  0   Post-procedure details      Chemodenervation:  Chronic migraine    Facial Nerve Location[de-identified]  Bilateral facial nerve    Patient tolerance of procedure:   Tolerated well, no immediate complications   Comments      Botox continues to be significantly helpful for reducing migraine frequency  Higher dosing clinically necessary for better migraine control as she does have wearing off the last month  No s/e  Nurtec not helpful at home  She rarely uses fioricet  She states Maxalt helps significantly however most recently she was told that this was 80 dollars or more/ month for her  I did give her another written script as well as a good Rx coupon so she can take it to the pharmacy that gets her the least price  I gave her 3 refills on this

## 2021-03-21 ENCOUNTER — HOSPITAL ENCOUNTER (EMERGENCY)
Facility: HOSPITAL | Age: 71
Discharge: HOME | End: 2021-03-21
Attending: EMERGENCY MEDICINE
Payer: MEDICARE

## 2021-03-21 VITALS
WEIGHT: 189.15 LBS | RESPIRATION RATE: 18 BRPM | BODY MASS INDEX: 31.48 KG/M2 | HEART RATE: 85 BPM | TEMPERATURE: 99.1 F | DIASTOLIC BLOOD PRESSURE: 84 MMHG | OXYGEN SATURATION: 98 % | SYSTOLIC BLOOD PRESSURE: 205 MMHG

## 2021-03-21 DIAGNOSIS — M79.605 LEFT LEG PAIN: Primary | ICD-10-CM

## 2021-03-21 LAB
ANION GAP SERPL CALCULATED.3IONS-SCNC: 7 MMOL/L (ref 4–13)
BASOPHILS # BLD AUTO: 0.02 THOUSANDS/ΜL (ref 0–0.1)
BASOPHILS NFR BLD AUTO: 0 % (ref 0–1)
BUN SERPL-MCNC: 26 MG/DL (ref 5–25)
CALCIUM SERPL-MCNC: 9.4 MG/DL (ref 8.3–10.1)
CHLORIDE SERPL-SCNC: 105 MMOL/L (ref 100–108)
CO2 SERPL-SCNC: 27 MMOL/L (ref 21–32)
CREAT SERPL-MCNC: 1.16 MG/DL (ref 0.6–1.3)
D DIMER PPP FEU-MCNC: 0.43 UG/ML FEU
EOSINOPHIL # BLD AUTO: 0.18 THOUSAND/ΜL (ref 0–0.61)
EOSINOPHIL NFR BLD AUTO: 2 % (ref 0–6)
ERYTHROCYTE [DISTWIDTH] IN BLOOD BY AUTOMATED COUNT: 13.3 % (ref 11.6–15.1)
GFR SERPL CREATININE-BSD FRML MDRD: 48 ML/MIN/1.73SQ M
GLUCOSE SERPL-MCNC: 109 MG/DL (ref 65–140)
HCT VFR BLD AUTO: 40.2 % (ref 34.8–46.1)
HGB BLD-MCNC: 13.1 G/DL (ref 11.5–15.4)
IMM GRANULOCYTES # BLD AUTO: 0.03 THOUSAND/UL (ref 0–0.2)
IMM GRANULOCYTES NFR BLD AUTO: 0 % (ref 0–2)
LYMPHOCYTES # BLD AUTO: 2.49 THOUSANDS/ΜL (ref 0.6–4.47)
LYMPHOCYTES NFR BLD AUTO: 26 % (ref 14–44)
MCH RBC QN AUTO: 33.1 PG (ref 26.8–34.3)
MCHC RBC AUTO-ENTMCNC: 32.6 G/DL (ref 31.4–37.4)
MCV RBC AUTO: 102 FL (ref 82–98)
MONOCYTES # BLD AUTO: 0.55 THOUSAND/ΜL (ref 0.17–1.22)
MONOCYTES NFR BLD AUTO: 6 % (ref 4–12)
NEUTROPHILS # BLD AUTO: 6.2 THOUSANDS/ΜL (ref 1.85–7.62)
NEUTS SEG NFR BLD AUTO: 66 % (ref 43–75)
NRBC BLD AUTO-RTO: 0 /100 WBCS
PLATELET # BLD AUTO: 170 THOUSANDS/UL (ref 149–390)
PMV BLD AUTO: 10 FL (ref 8.9–12.7)
POTASSIUM SERPL-SCNC: 4.1 MMOL/L (ref 3.5–5.3)
RBC # BLD AUTO: 3.96 MILLION/UL (ref 3.81–5.12)
SODIUM SERPL-SCNC: 139 MMOL/L (ref 136–145)
WBC # BLD AUTO: 9.47 THOUSAND/UL (ref 4.31–10.16)

## 2021-03-21 PROCEDURE — 99284 EMERGENCY DEPT VISIT MOD MDM: CPT | Performed by: EMERGENCY MEDICINE

## 2021-03-21 PROCEDURE — 36415 COLL VENOUS BLD VENIPUNCTURE: CPT | Performed by: EMERGENCY MEDICINE

## 2021-03-21 PROCEDURE — 85025 COMPLETE CBC W/AUTO DIFF WBC: CPT | Performed by: EMERGENCY MEDICINE

## 2021-03-21 PROCEDURE — 99283 EMERGENCY DEPT VISIT LOW MDM: CPT

## 2021-03-21 PROCEDURE — 80048 BASIC METABOLIC PNL TOTAL CA: CPT | Performed by: EMERGENCY MEDICINE

## 2021-03-21 PROCEDURE — 85379 FIBRIN DEGRADATION QUANT: CPT | Performed by: EMERGENCY MEDICINE

## 2021-03-21 RX ORDER — IBUPROFEN 600 MG/1
600 TABLET ORAL ONCE
Status: DISCONTINUED | OUTPATIENT
Start: 2021-03-21 | End: 2021-03-21 | Stop reason: HOSPADM

## 2021-03-22 NOTE — ED PROVIDER NOTES
History  Chief Complaint   Patient presents with    Ankle Swelling     Pt c/o left ankle swelling  Pt states she woke up with the swelling  73 yo female c/o discomfort she localizes to the left ankle, with perception of swelling, and pain in the ankle area and back of her left leg  She denies injury, only noticing it first upon waking this morning  History provided by:  Patient  Ankle Swelling  Location:  Ankle  Time since incident:  12 hours  Injury: no    Ankle location:  L ankle  Pain details:     Quality:  Cramping    Severity:  Mild    Onset quality:  Gradual    Duration:  12 hours    Timing:  Intermittent    Progression:  Waxing and waning  Chronicity:  New  Associated symptoms: no back pain and no fever        Prior to Admission Medications   Prescriptions Last Dose Informant Patient Reported? Taking? ACETAMINOPHEN-BUTALBITAL  MG TABS   No No   Si tab at migraine onset, repeat after 4 hours if needed  Max 2 per day and 3 per week     Cyanocobalamin 1000 MCG/ML KIT   No No   Sig: Inject 1 ml IM once monthly   DULoxetine (CYMBALTA) 30 mg delayed release capsule   Yes No   Sig: daily   Galcanezumab-gnlm (Emgality) 120 MG/ML SOAJ   No No   Si injection monthly   LORazepam (ATIVAN) 2 mg tablet  Self Yes No   Sig: Take 2 mg by mouth 3 (three) times a day as needed   Melatonin ER 10 MG TBCR   No No   Sig: TAKE 1 TABLET BY MOUTH EVERYDAY AT BEDTIME   Menthol, Topical Analgesic, 16 % LIQD   No No   Sig: Spray on skin prior to injection   OLANZapine (ZyPREXA) 2 5 mg tablet  Self No No   Sig: TAKE 1 TAB AT BEDTIME AS NEEDED MIGRAINE   SYRINGE-NEEDLE, DISP, 3 ML (BD Integra Syringe) 25G X 1" 3 ML MISC   No No   Sig: USE FOR TORADOL INJECTION AS NEEDED FOR SEVERE HEADACHE   albuterol (PROVENTIL HFA,VENTOLIN HFA) 90 mcg/act inhaler   Yes No   Sig: TAKE 2 PUFFS BY MOUTH EVERY 4 TO 6 HOURS AS NEEDED   azelastine (ASTELIN) 0 1 % nasal spray  Self Yes No   Si sprays into each nostril 2 (two) times a day Use in each nostril as directed   baclofen 10 mg tablet   Yes No   Sig: Take 10 mg by mouth 3 (three) times a day as needed   benzonatate (TESSALON) 200 MG capsule   Yes No   Sig: TAKE 1 CAPSULE BY MOUTH THREE TIMES A DAY AS NEEDED FOR COUGH   bisoprolol (ZEBETA) 5 mg tablet  Self Yes No   Sig: Take 5 mg by mouth daily PRN   buPROPion (WELLBUTRIN XL) 300 mg 24 hr tablet  Self No No   Sig: Take 1 tablet (300 mg total) by mouth daily   busPIRone (BUSPAR) 15 mg tablet   Yes No   cetirizine (ZyrTEC) 10 mg tablet  Self Yes No   Sig: Take 10 mg by mouth daily   dicyclomine (BENTYL) 10 mg capsule  Self Yes No   Sig: Take 10 mg by mouth 3 (three) times a day as needed PRN   fluticasone (FLONASE) 50 mcg/act nasal spray  Self Yes No   Si sprays into each nostril daily   gabapentin (NEURONTIN) 300 mg capsule   No No   Sig: Take 2 tabs nightly x 1 wk, then 1 tab AM and 2 tabs nightly x 1 wk, then 1 tab AM, 1 tab afternoon and 2 tabs nightly increase as needed/tolerated   ketorolac (TORADOL) 30 mg/mL injection   No No   Si mL or 30 mg/mL IM injection p r n  migraine, repeat after 6 hours if needed    No more than 2 per day or 3 per week    lidocaine (XYLOCAINE) 5 % ointment   No No   Sig: Apply topically as needed for mild pain   montelukast (SINGULAIR) 10 mg tablet   Yes No   Sig: Take 10 mg by mouth every evening   olopatadine (PATANOL) 0 1 % ophthalmic solution   Yes No   Sig: INSTILL ONE DROP INTO INTO BOTH EYES AS NEEDED   oxyCODONE (OxyCONTIN) 10 mg 12 hr tablet  Self Yes No   Sig: Take 10 mg by mouth every 8 (eight) hours   oxyCODONE-acetaminophen (PERCOCET) 5-325 mg per tablet   Yes No   Sig: PLEASE SEE ATTACHED FOR DETAILED DIRECTIONS   promethazine (PHENERGAN) 25 mg tablet   No No   Sig: TAKE 1 TAB EVERY 8 HOURS FOR MIGRAINE/NAUSEA AS NEEDED MAX THREE DAYS A WEEK   rOPINIRole (REQUIP) 1 mg tablet  Self Yes No   Sig: Take 4 mg by mouth daily    rizatriptan (MAXALT-MLT) 10 MG disintegrating tablet   No No   Si tab at migraine onset, repeat after 2 hours if needed; Max 2 per day and 3 per week  rizatriptan (MAXALT-MLT) 10 MG disintegrating tablet   No No   Sig: Take 1 tablet (10 mg total) by mouth once as needed for migraine (inject at migraine onset can repeat in two hours max two tabs in 24 hours  Max 2 days a week) May repeat every 2 hours if needed, max 30mg/24 hours   tiZANidine (ZANAFLEX) 2 mg tablet   No No   Sig: TAKE 1 TABLET BY MOUTH AT BEDTIME AS NEEDED FOR NECK PAIN   Patient not taking: Reported on 2021   verapamil (CALAN) 80 mg tablet   No No   Si tabs q12 hours  Facility-Administered Medications Last Administration Doses Remaining   prochlorperazine (COMPAZINE) injection 5 mg None recorded           Past Medical History:   Diagnosis Date    Allergic rhinitis     Anxiety     Arthritis     Cancer (HCC)     Skin     Chronic pain disorder     knee and back    Chronic prescription opiate use     Depression     GERD (gastroesophageal reflux disease)     Hypertension     Irritable bowel syndrome     Migraines     Spinal stenosis        Past Surgical History:   Procedure Laterality Date    CHOLECYSTECTOMY      COLONOSCOPY      SD REV UPPER EYELID W EXCESS SKIN Bilateral 2020    Procedure: BLEPHAROPLASTY UPPER;  Surgeon: Luke Moss MD;  Location: [facility] MAIN OR;  Service: Plastics    ROTATOR CUFF REPAIR Bilateral        Family History   Problem Relation Age of Onset    Breast cancer Mother     Alzheimer's disease Father     Depression Sister      I have reviewed and agree with the history as documented      E-Cigarette/Vaping    E-Cigarette Use Never User      E-Cigarette/Vaping Substances     Social History     Tobacco Use    Smoking status: Current Every Day Smoker     Packs/day: 0 50     Types: Cigarettes    Smokeless tobacco: Never Used   Substance Use Topics    Alcohol use: Yes     Comment: occ    Drug use: No       Review of Systems   Constitutional: Negative for chills and fever  HENT: Negative for ear pain and sore throat  Eyes: Negative for pain and visual disturbance  Respiratory: Negative for cough and shortness of breath  Cardiovascular: Negative for chest pain and palpitations  Gastrointestinal: Negative for abdominal pain and vomiting  Genitourinary: Negative for dysuria and hematuria  Musculoskeletal: Negative for arthralgias and back pain  Skin: Negative for color change and rash  Neurological: Negative for seizures and syncope  All other systems reviewed and are negative  Physical Exam  Physical Exam  Vitals signs and nursing note reviewed  Constitutional:       General: She is not in acute distress  Appearance: She is well-developed  She is not diaphoretic  HENT:      Head: Normocephalic and atraumatic  Right Ear: External ear normal       Left Ear: External ear normal       Nose: Nose normal    Eyes:      Conjunctiva/sclera: Conjunctivae normal       Pupils: Pupils are equal, round, and reactive to light  Neck:      Musculoskeletal: Normal range of motion and neck supple  Cardiovascular:      Rate and Rhythm: Normal rate and regular rhythm  Pulmonary:      Effort: Pulmonary effort is normal    Abdominal:      Palpations: Abdomen is soft  Musculoskeletal: Normal range of motion  Left lower leg: She exhibits tenderness (mild posterior calf area)  She exhibits no swelling (no significant difference in circumference from right)  Skin:     General: Skin is warm and dry  Findings: No rash  Neurological:      Mental Status: She is alert and oriented to person, place, and time  Gait: Gait normal    Psychiatric:         Behavior: Behavior normal          Thought Content:  Thought content normal          Judgment: Judgment normal          Vital Signs  ED Triage Vitals [03/21/21 2012]   Temperature Pulse Respirations Blood Pressure SpO2   99 1 °F (37 3 °C) 85 18 (!) 205/84 98 %      Temp Source Heart Rate Source Patient Position - Orthostatic VS BP Location FiO2 (%)   Oral Monitor Sitting Right arm --      Pain Score       --           Vitals:    03/21/21 2012   BP: (!) 205/84   Pulse: 85   Patient Position - Orthostatic VS: Sitting         Visual Acuity      ED Medications  Medications   ibuprofen (MOTRIN) tablet 600 mg (600 mg Oral Not Given 3/21/21 2145)       Diagnostic Studies  Results Reviewed     Procedure Component Value Units Date/Time    D-Dimer [[id number]]  (Normal) Collected: 03/21/21 2052    Lab Status: Final result Specimen: Blood from Arm, Left Updated: 03/21/21 2118     D-Dimer, Quant 0 43 ug/ml FEU     Basic metabolic panel [[id number]]  (Abnormal) Collected: 03/21/21 2052    Lab Status: Final result Specimen: Blood from Arm, Right Updated: 03/21/21 2110     Sodium 139 mmol/L      Potassium 4 1 mmol/L      Chloride 105 mmol/L      CO2 27 mmol/L      ANION GAP 7 mmol/L      BUN 26 mg/dL      Creatinine 1 16 mg/dL      Glucose 109 mg/dL      Calcium 9 4 mg/dL      eGFR 48 ml/min/1 73sq m     Narrative:      [place] guidelines for Chronic Kidney Disease (CKD):     Stage 1 with normal or high GFR (GFR > 90 mL/min/1 73 square meters)    Stage 2 Mild CKD (GFR = 60-89 mL/min/1 73 square meters)    Stage 3A Moderate CKD (GFR = 45-59 mL/min/1 73 square meters)    Stage 3B Moderate CKD (GFR = 30-44 mL/min/1 73 square meters)    Stage 4 Severe CKD (GFR = 15-29 mL/min/1 73 square meters)    Stage 5 End Stage CKD (GFR <15 mL/min/1 73 square meters)  Note: GFR calculation is accurate only with a steady state creatinine    CBC and differential [[id number]]  (Abnormal) Collected: 03/21/21 2052    Lab Status: Final result Specimen: Blood from Arm, Right Updated: 03/21/21 2058     WBC 9 47 Thousand/uL      RBC 3 96 Million/uL      Hemoglobin 13 1 g/dL      Hematocrit 40 2 %       fL      MCH 33 1 pg      MCHC 32 6 g/dL      RDW 13 3 %      MPV 10 0 fL      Platelets 779 Thousands/uL      nRBC 0 /100 WBCs      Neutrophils Relative 66 %      Immat GRANS % 0 %      Lymphocytes Relative 26 %      Monocytes Relative 6 %      Eosinophils Relative 2 %      Basophils Relative 0 %      Neutrophils Absolute 6 20 Thousands/µL      Immature Grans Absolute 0 03 Thousand/uL      Lymphocytes Absolute 2 49 Thousands/µL      Monocytes Absolute 0 55 Thousand/µL      Eosinophils Absolute 0 18 Thousand/µL      Basophils Absolute 0 02 Thousands/µL                  No orders to display              Procedures  Procedures         ED Course  ED Course as of Mar 21 2233   Anastasia Johnson Mar 21, 2021   2121 Negative, low risk by [facility] so no additional imaging indicated at this time   D-Dimer, Quant: 0 43                                   Wells' Criteria for DVT      Most Recent Value   Wells' Criteria for DVT   Active cancer Treatment or palliation within 6 months  0 Filed at: 03/21/2021 2134   Bedridden recently >3 days or major surgery within 12 weeks  0 Filed at: 03/21/2021 2134   Calf swelling >3 cm compared to the other leg  0 Filed at: 03/21/2021 2134   Entire leg swollen  0 Filed at: 03/21/2021 2134   Collateral (nonvaricose) superficial veins present  0 Filed at: 03/21/2021 2134   Localized tenderness along the deep venous system  0 Filed at: 03/21/2021 2134   Pitting edema, confined to symptomatic leg  0 Filed at: 03/21/2021 2134   Paralysis, paresis, or recent plaster immobilization of the lower extremity  0 Filed at: 03/21/2021 2134   Previously documented DVT  0 Filed at: 03/21/2021 2134   Alternative diagnosis to DVT as likely or more likely  0 Filed at: 03/21/2021 2134   Wells DVT Critera Score  0 Filed at: 03/21/2021 2134              MDM    Disposition  Final diagnoses:   Left leg pain     Time reflects when diagnosis was documented in both MDM as applicable and the Disposition within this note     Time User Action Codes Description Comment    3/21/2021  9:29 PM Dayron Caban Add [M79 605] Left leg pain       ED Disposition     ED Disposition Condition Date/Time Comment    Discharge Good Sun Mar 21, 2021  9:21 PM Jhon Bauman discharge to home/self care  Follow-up Information     Follow up With Specialties Details Why Contact Balbina Hall, DO Internal Medicine Go to  As needed [address]  [phone]            Discharge Medication List as of 3/21/2021  9:29 PM      CONTINUE these medications which have NOT CHANGED    Details   ACETAMINOPHEN-BUTALBITAL  MG TABS 1 tab at migraine onset, repeat after 4 hours if needed   Max 2 per day and 3 per week , Normal      albuterol (PROVENTIL HFA,VENTOLIN HFA) 90 mcg/act inhaler TAKE 2 PUFFS BY MOUTH EVERY 4 TO 6 HOURS AS NEEDED, Historical Med      azelastine (ASTELIN) 0 1 % nasal spray 2 sprays into each nostril 2 (two) times a day Use in each nostril as directed, Historical Med      baclofen 10 mg tablet Take 10 mg by mouth 3 (three) times a day as needed, Starting Mon 12/21/2020, Historical Med      benzonatate (TESSALON) 200 MG capsule TAKE 1 CAPSULE BY MOUTH THREE TIMES A DAY AS NEEDED FOR COUGH, Historical Med      bisoprolol (ZEBETA) 5 mg tablet Take 5 mg by mouth daily PRN, Historical Med      buPROPion (WELLBUTRIN XL) 300 mg 24 hr tablet Take 1 tablet (300 mg total) by mouth daily, Starting Tue 3/24/2020, Normal      busPIRone (BUSPAR) 15 mg tablet Starting Wed 2/10/2021, Historical Med      cetirizine (ZyrTEC) 10 mg tablet Take 10 mg by mouth daily, Historical Med      Cyanocobalamin 1000 MCG/ML KIT Inject 1 ml IM once monthly, Normal      dicyclomine (BENTYL) 10 mg capsule Take 10 mg by mouth 3 (three) times a day as needed PRN, Historical Med      DULoxetine (CYMBALTA) 30 mg delayed release capsule daily, Starting Mon 10/5/2020, Historical Med      fluticasone (FLONASE) 50 mcg/act nasal spray 2 sprays into each nostril daily, Historical Med      gabapentin (NEURONTIN) 300 mg capsule Take 2 tabs nightly x 1 wk, then 1 tab AM and 2 tabs nightly x 1 wk, then 1 tab AM, 1 tab afternoon and 2 tabs nightly increase as needed/tolerated, Normal      Galcanezumab-gnlm (Emgality) 120 MG/ML SOAJ 1 injection monthly, Normal      ketorolac (TORADOL) 30 mg/mL injection 1 mL or 30 mg/mL IM injection p r n  migraine, repeat after 6 hours if needed  No more than 2 per day or 3 per week , Normal      lidocaine (XYLOCAINE) 5 % ointment Apply topically as needed for mild pain, Starting Wed 2/17/2021, Normal      LORazepam (ATIVAN) 2 mg tablet Take 2 mg by mouth 3 (three) times a day as needed, Starting Wed 7/29/2020, Historical Med      Melatonin ER 10 MG TBCR TAKE 1 TABLET BY MOUTH EVERYDAY AT BEDTIME, Normal      Menthol, Topical Analgesic, 16 % LIQD Spray on skin prior to injection, Normal      montelukast (SINGULAIR) 10 mg tablet Take 10 mg by mouth every evening, Starting Tue 12/1/2020, Historical Med      OLANZapine (ZyPREXA) 2 5 mg tablet TAKE 1 TAB AT BEDTIME AS NEEDED MIGRAINE, Normal      olopatadine (PATANOL) 0 1 % ophthalmic solution INSTILL ONE DROP INTO INTO BOTH EYES AS NEEDED, Historical Med      oxyCODONE (OxyCONTIN) 10 mg 12 hr tablet Take 10 mg by mouth every 8 (eight) hours, Historical Med      oxyCODONE-acetaminophen (PERCOCET) 5-325 mg per tablet PLEASE SEE ATTACHED FOR DETAILED DIRECTIONS, Historical Med      promethazine (PHENERGAN) 25 mg tablet TAKE 1 TAB EVERY 8 HOURS FOR MIGRAINE/NAUSEA AS NEEDED MAX THREE DAYS A WEEK, Normal      !! rizatriptan (MAXALT-MLT) 10 MG disintegrating tablet 1 tab at migraine onset, repeat after 2 hours if needed; Max 2 per day and 3 per week , Normal      !! rizatriptan (MAXALT-MLT) 10 MG disintegrating tablet Take 1 tablet (10 mg total) by mouth once as needed for migraine (inject at migraine onset can repeat in two hours max two tabs in 24 hours   Max 2 days a week) May repeat every 2 hours if needed, max 30mg/24 hours, Starting Thu 3/4/2021, Print      rOPINIRole (REQUIP) 1 mg tablet Take 4 mg by mouth daily , Historical Med      SYRINGE-NEEDLE, DISP, 3 ML (BD Integra Syringe) 25G X 1" 3 ML MISC USE FOR TORADOL INJECTION AS NEEDED FOR SEVERE HEADACHE, Normal      tiZANidine (ZANAFLEX) 2 mg tablet TAKE 1 TABLET BY MOUTH AT BEDTIME AS NEEDED FOR NECK PAIN, Normal      verapamil (CALAN) 80 mg tablet 2 tabs q12 hours  , Normal       !! - Potential duplicate medications found  Please discuss with provider  No discharge procedures on file      PDMP Review     None          ED Provider  Electronically Signed by           Jeovanny Ware MD  03/21/21 Guevara Capps MD  03/21/21 0694

## 2021-03-22 NOTE — ED NOTES
Patient was discharged by [facility] prior to RN obtaining discharge vital signs      Juan Jose Watters RN  03/21/21 [id number]

## 2021-03-22 NOTE — DISCHARGE INSTRUCTIONS
Leg Cramps   WHAT YOU NEED TO KNOW:   A leg cramp is a sudden, painful squeeze in your calf (lower leg) or thigh (upper leg) muscles  The muscle may twitch under the skin or feel hard  Your leg may feel sore long after the muscles relax  DISCHARGE INSTRUCTIONS:   To stretch your leg:  Warm up your muscles before you stretch  Take a short walk or run slowly in place  If you get leg cramps while you sleep, you may also want to stretch before bedtime  The following exercises stretch the calves and thighs to help stop or prevent leg cramps: To stretch your calf and heel:      Stand up and place the palms of your hands against a wall  Lean into the wall, with one leg behind the other  Bend the front leg and keep the back leg straight  Press the heel of your back leg into the floor  Hold for 15 to 30 seconds, then switch sides  To stretch the back of your knee and thigh:      Sit on the floor with your legs straight in front of you  Do not point your toes  Place the palms of your hands at your sides on the floor  Slide your hands past your hips toward your ankles  Move toward your ankles until you feel a stretch in the back of your legs  Do not try to touch your head to your knees or round your back  Hold for 30 seconds  Drink plenty of liquids during exercise:  Water and other liquids help prevent cramps by replacing fluids lost in sweat  Drink more liquids when you are active in hot weather  To stop a leg cramp if it happens again:   Stretch and massage your muscle to stop the cramp  Apply heat or ice packs to the cramp  A heating pad or hot pack may help relieve tight muscles  An ice pack or crushed ice in a bag, wrapped in a towel can soothe tender or sore muscles  Contact your healthcare provider if:   Your leg cramps get worse or happen more often  Your leg feels numb  Your leg cramps do not go away with stretching or massage      You feel dizzy, lightheaded, or confused when you exercise in hot weather  You may have a headache or blurred vision

## 2021-03-23 ENCOUNTER — TELEPHONE (OUTPATIENT)
Dept: NEUROLOGY | Facility: CLINIC | Age: 71
End: 2021-03-23

## 2021-03-23 DIAGNOSIS — G43.719 INTRACTABLE CHRONIC MIGRAINE WITHOUT AURA AND WITHOUT STATUS MIGRAINOSUS: ICD-10-CM

## 2021-03-23 DIAGNOSIS — G43.719 INTRACTABLE CHRONIC MIGRAINE WITHOUT AURA AND WITHOUT STATUS MIGRAINOSUS: Primary | ICD-10-CM

## 2021-03-23 RX ORDER — NEEDLES, FILTER 19GX1 1/2"
NEEDLE, DISPOSABLE MISCELLANEOUS
Qty: 1 EACH | Refills: 6 | OUTPATIENT
Start: 2021-03-23 | End: 2021-04-22 | Stop reason: SDUPTHER

## 2021-03-23 NOTE — TELEPHONE ENCOUNTER
[facility]  They transferred patient's b-12 injections, but do not have a script for syringes  Provided with verbal for the appropriate syringes in the amount to match cyanocobalamin  Please sign   Phone in script queued up for tracking purposes  TY!

## 2021-03-23 NOTE — TELEPHONE ENCOUNTER
Patient calling in   She states rizatriptan works but the [employer] and would like another abortive medication as a backup to the rizatriptan     Please advise    Cb#: [phone], okay to leave detailed message

## 2021-03-24 NOTE — TELEPHONE ENCOUNTER
Patient returning call to office  Patient states the toradol is not effective and she will not take steroids as she is already getting steroid injections for her hip and they don't work  She does not understand how there are so many medications under the neurology umbrella and we're having a hard time finding one for her       Please advise

## 2021-03-24 NOTE — TELEPHONE ENCOUNTER
To clarify- the nurtec does not work? Appropriate back up would be toradol, already has that, and/or decadron

## 2021-03-25 RX ORDER — ZOLMITRIPTAN 5 MG/1
TABLET, ORALLY DISINTEGRATING ORAL
Qty: 9 TABLET | Refills: 0 | Status: SHIPPED | OUTPATIENT
Start: 2021-03-25 | End: 2021-04-13

## 2021-03-25 NOTE — TELEPHONE ENCOUNTER
In the recent past the patient tried the following abortives, or were denied by her insurance:    Maxalt   promethazine   Nurtec   Ubrelvy    Tizanidine   baclofen   Compazine   steroids   Zofran   olanzapine   Toradol   Ativan    Seems she has not tried alternative triptans  Could trial zomig PO or NS if covered, however could not do maxalt at the same time  I will send to see if covered  Alternative- depakote taper

## 2021-03-26 NOTE — TELEPHONE ENCOUNTER
Attempted to call patient regarding below, no response   Unable to leave voicemail as voicemail box is full

## 2021-03-29 ENCOUNTER — TELEPHONE (OUTPATIENT)
Dept: NEUROLOGY | Facility: CLINIC | Age: 71
End: 2021-03-29

## 2021-03-29 NOTE — TELEPHONE ENCOUNTER
Patient calling in stating that for the past couple of months she has been having some issues after taking her AM medications  She states she takes her AM medications at 11 AM and says she feels spaced out and/or dizzy to where she cannot drive  This does not happen with her PM dose of medication  Patient states her BP's have been stable in the 120's       Medications:   verapamil 80 mg 1 tab in AM  gabapentin 300 mg 1 tab in AM, 2 in PM  [place] 15 mg in AM  Wellbutrin 300 mg in AM    Please advise

## 2021-03-30 ENCOUNTER — PATIENT MESSAGE (OUTPATIENT)
Dept: NEUROLOGY | Facility: CLINIC | Age: 71
End: 2021-03-30

## 2021-03-30 NOTE — TELEPHONE ENCOUNTER
Called patient, attempted to leave voicemail, but mailbox full  Will send MyChart message to patient to call office

## 2021-03-30 NOTE — TELEPHONE ENCOUNTER
Did she tell me this at the last appt? We need to re-evaluate this at the next visit  If bp stable that is good  She may need to contact her other provider re: changing doses of buspar and wellbutrin  If she thinks it is the gabapentin she can d/c this AM dose but her headaches may worsen  If vertigo- would recommend NOT driving and having someone bring her to vestibular therapy

## 2021-04-07 NOTE — TELEPHONE ENCOUNTER
Patient returned call after receiving letter in the mail  Advised of this message and also of message in other encounter  Patient verbalized understanding  Patient states she has been laid up with migraine headaches since last week on/off and did not know we were calling  Patient states that medications are not working  Patient does not recall if she discussed any of this with you at her last appointment  Patient thought taking her migraine medications from the night before was what was causing some of her morning symptoms  I advised that the likely cause of symptoms 45 minutes after taking her morning medications would be one of the morning medications  Patient verbalized understanding  Patient also asked about her fluid intake  She states that she isn't drinking a lot of diet soda  I asked specifically about her water intake  She states she could probably drink some more water  I advised that it looks like she could potentially be slightly dehydrated and even if she is not, she is on many medications and they can filter through her liver or kidneys to get out of her system  Advised that it is important to drink enough water to keep her system safe and healthy due to this  I also advised that not drinking enough water can trigger migraines, so it is important to do so for this reason  Patient was welcoming of the information and thanked me for discussing this with her  She states that she is going to make sure to do this  Per the other encounter, the patient states none of her abortive medications are working for her  The zomig sent to the pharmacy did not work for her and her insurance will not continue to pay for this  I did look at patient's formulary  She states that the rizatriptan is the only medication that has been effective at all for her when she has a headache      Upon further review of the patient's formulary, I was able to see some other alternative triptans that she would be eligible for on her insurance  Sumatriptan prefilled syringe 6mg/0 5mL QL 4mL per 30 days    Sumatriptan nasal spray 12 ea per 30 days (must be sent to mail order)    DHE injection QL 1mL per 30 days (requires [place])    Patient was a no show to her 3/31/21 appointment with you  She states that she would like to be seen  You have no availability in the near future  Please advise if you would be able to fit her in somewhere  She states she didn't realize she had an appointment with the headaches and being in bed last week  Please advise

## 2021-04-08 NOTE — TELEPHONE ENCOUNTER
It appears next Botox is June 17th, that is a long way to go  If her migraines are worse she may benefit from a migraine infusion  If she is agreeable we can set this up  I am a little hesitant to use sumatriptan injectable because her blood pressure tends to be on the high side  Last noted pressure 3/21/2021 was 205/84  Can she please take her blood pressure at home and let us know what it is on average on a daily basis? Alternatively would she would be agreeable to going back on the gabapentin  Q h s  p r n  migraine? Or depakote taper? Tell her about ear seeds  Please put her on the wait list for 40-60 minute visit

## 2021-04-13 DIAGNOSIS — G43.719 INTRACTABLE CHRONIC MIGRAINE WITHOUT AURA AND WITHOUT STATUS MIGRAINOSUS: ICD-10-CM

## 2021-04-13 RX ORDER — RIZATRIPTAN BENZOATE 10 MG/1
TABLET, ORALLY DISINTEGRATING ORAL
Qty: 12 TABLET | Refills: 0 | Status: SHIPPED | OUTPATIENT
Start: 2021-04-13 | End: 2021-05-11

## 2021-04-15 DIAGNOSIS — G43.719 INTRACTABLE CHRONIC MIGRAINE WITHOUT AURA AND WITHOUT STATUS MIGRAINOSUS: ICD-10-CM

## 2021-04-15 RX ORDER — VERAPAMIL HYDROCHLORIDE 80 MG/1
TABLET ORAL
Qty: 180 TABLET | Refills: 1 | Status: SHIPPED | OUTPATIENT
Start: 2021-04-15 | End: 2021-07-12

## 2021-04-15 NOTE — TELEPHONE ENCOUNTER
Patient requesting refill of verapamil to pharmacy  I have queued this up for you  Please sign if agreeable

## 2021-04-22 DIAGNOSIS — G43.719 INTRACTABLE CHRONIC MIGRAINE WITHOUT AURA AND WITHOUT STATUS MIGRAINOSUS: ICD-10-CM

## 2021-04-22 RX ORDER — NEEDLES, FILTER 19GX1 1/2"
NEEDLE, DISPOSABLE MISCELLANEOUS
Qty: 8 EACH | Refills: 0 | OUTPATIENT
Start: 2021-04-22

## 2021-04-22 NOTE — TELEPHONE ENCOUNTER
Patient called and left voicemail requesting refill of her syringes    Order pended below, please sign if agreeable

## 2021-04-26 ENCOUNTER — TELEPHONE (OUTPATIENT)
Dept: NEUROLOGY | Facility: CLINIC | Age: 71
End: 2021-04-26

## 2021-04-26 DIAGNOSIS — G43.719 INTRACTABLE CHRONIC MIGRAINE WITHOUT AURA AND WITHOUT STATUS MIGRAINOSUS: Primary | ICD-10-CM

## 2021-04-26 RX ORDER — GALCANEZUMAB 120 MG/ML
INJECTION, SOLUTION SUBCUTANEOUS
Qty: 1 PEN | Refills: 5 | Status: SHIPPED | OUTPATIENT
Start: 2021-04-26 | End: 2021-08-10 | Stop reason: SDUPTHER

## 2021-04-26 RX ORDER — SUMATRIPTAN 6 MG/.5ML
6 INJECTION, SOLUTION SUBCUTANEOUS ONCE AS NEEDED
Qty: 1 ML | Refills: 0 | Status: SHIPPED | OUTPATIENT
Start: 2021-04-26 | End: 2021-05-27 | Stop reason: SDUPTHER

## 2021-04-26 NOTE — TELEPHONE ENCOUNTER
Left patient detailed message regarding below  Instructed patient to return call to office to confirm she is agreeable to sumatriptan injection and having to give up on rizatriptan  Submitted PA on CMM, awaiting determination   Key: [id number]

## 2021-04-26 NOTE — TELEPHONE ENCOUNTER
I can send sumatriptan injectable but she cannot take this with the Maxalt  Therefore I can only send 1 or 2 injections for her to try, and if she prefers more she will need to give up the [address] [place]  Please just resume 1 monthly injectable of Emgality

## 2021-04-26 NOTE — TELEPHONE ENCOUNTER
Patient calling in  Patient states the gabapentin causes her to feel loopy in the AM if she takes any other migraine medication the night before  She also feels it just has not been effective so she states she will be discontinuing this  She was taking 1 tab in AM and 2 in the evening  Patient says that she had started [facility] about 2 months ago but after the loading dose, she "never heard anything about it again"  I asked the patient if she reached out to the pharmacy for her refills and she said "no because I didn't know the name of the medication"  Patient wants to restart medication, has not had an injection in 2 months  Does patient need to start again with another loading dose? Patient also states that an injectable triptan mediation was offered to her once before but was never sent to the pharmacy  If patient is allowed to have injection triptan, please sent to pharmacy on file  Please advise  Cb#: [phone]    Will submit another PA once we determine if patient will need loading dose again       ID: [id number]  BIn: [id number]  PCN: Sophie Gauthier  group: [id number]

## 2021-04-27 ENCOUNTER — DOCUMENTATION (OUTPATIENT)
Dept: NEUROLOGY | Facility: CLINIC | Age: 71
End: 2021-04-27

## 2021-04-27 NOTE — PROGRESS NOTES
Patient is scheduled 6/17/2021 with Dr Renetta Porter in the [facility] [address]

## 2021-04-27 NOTE — PROGRESS NOTES
Type Date User Summary Attachment   General 04/27/2021  2:59 PM Yimi Oden care coordination -   Note    Botox no authorization needed   Please use our stock      Thanks  Sharita Buchanan

## 2021-05-11 DIAGNOSIS — G43.719 INTRACTABLE CHRONIC MIGRAINE WITHOUT AURA AND WITHOUT STATUS MIGRAINOSUS: ICD-10-CM

## 2021-05-11 RX ORDER — RIZATRIPTAN BENZOATE 10 MG/1
TABLET, ORALLY DISINTEGRATING ORAL
Qty: 9 TABLET | Refills: 1 | Status: SHIPPED | OUTPATIENT
Start: 2021-05-11 | End: 2021-06-17 | Stop reason: SDUPTHER

## 2021-05-19 ENCOUNTER — TELEPHONE (OUTPATIENT)
Dept: NEUROLOGY | Facility: CLINIC | Age: 71
End: 2021-05-19

## 2021-05-19 NOTE — TELEPHONE ENCOUNTER
Patient calling for refill of her phenergan  I advised that she was provided with script 3/1/21 with 2 refills  She would not be due for next refill until 6/1/21  Patient will call [facility] to check on this

## 2021-05-27 DIAGNOSIS — G43.719 INTRACTABLE CHRONIC MIGRAINE WITHOUT AURA AND WITHOUT STATUS MIGRAINOSUS: Primary | ICD-10-CM

## 2021-05-27 DIAGNOSIS — R11.0 NAUSEA: ICD-10-CM

## 2021-05-27 NOTE — TELEPHONE ENCOUNTER
Patient calling for refills of her medications  Patient requesting refill of prochlorperazine and sumatriptan injectable  Patient states that this is actually working well for her  I did look up patient's formulary under her insurance  She is able to get 4mL per 30 days  Please fill if agreeable and review scripts for patient   TY                    MIGRAINE  AIMOVIG 3 QL (1 ML per 30 days) PA  dihydroergotamine mesylate inj 4 PA dihydroergotamine mesylate nasal soln 4 QL (8 ML per 30 days) PA  eletriptan hydrobromide 3 QL (12 EA per 30 days)   ergotamine tartrate/caffeine 3   naratriptan hcl 3 QL (9 EA per 30 days)   rizatriptan benzoate odt 2 QL (12 EA per 30 days)  rizatriptan benzoate tabs 2 QL (12 EA per 30 days)   sumatriptan nasal spray 3 QL (12 EA per 30 days)   sumatriptan succinate tabs 2 QL (9 EA per 30 days)   sumatriptan succinate inj 4mg/0 5ml, 6mg/0 5ml 4 QL (4 ML per 30 days)   UBRELVY 4 QL (16 EA per 30 days) PA

## 2021-05-28 RX ORDER — SUMATRIPTAN 6 MG/.5ML
INJECTION, SOLUTION SUBCUTANEOUS
Qty: 4 ML | Refills: 1 | Status: SHIPPED | OUTPATIENT
Start: 2021-05-28 | End: 2021-08-10 | Stop reason: SINTOL

## 2021-05-28 RX ORDER — SYRINGE W-NEEDLE,DISPOSAB,3 ML 25GX5/8"
SYRINGE, EMPTY DISPOSABLE MISCELLANEOUS
Qty: 8 EACH | Refills: 1 | Status: SHIPPED | OUTPATIENT
Start: 2021-05-28 | End: 2021-06-22 | Stop reason: SDUPTHER

## 2021-05-28 RX ORDER — PROCHLORPERAZINE MALEATE 10 MG
10 TABLET ORAL EVERY 6 HOURS PRN
Qty: 30 TABLET | Refills: 1 | Status: SHIPPED | OUTPATIENT
Start: 2021-05-28 | End: 2021-06-17 | Stop reason: SDUPTHER

## 2021-06-09 ENCOUNTER — TELEPHONE (OUTPATIENT)
Dept: NEUROLOGY | Facility: CLINIC | Age: 71
End: 2021-06-09

## 2021-06-09 NOTE — TELEPHONE ENCOUNTER
Received fax from [facility] [employer] regarding patient's sumatriptan injection  Form completed and faxed back

## 2021-06-17 ENCOUNTER — PROCEDURE VISIT (OUTPATIENT)
Dept: NEUROLOGY | Facility: CLINIC | Age: 71
End: 2021-06-17
Payer: MEDICARE

## 2021-06-17 ENCOUNTER — TELEPHONE (OUTPATIENT)
Dept: NEUROLOGY | Facility: CLINIC | Age: 71
End: 2021-06-17

## 2021-06-17 VITALS — TEMPERATURE: 98 F | SYSTOLIC BLOOD PRESSURE: 140 MMHG | DIASTOLIC BLOOD PRESSURE: 82 MMHG

## 2021-06-17 DIAGNOSIS — G43.719 INTRACTABLE CHRONIC MIGRAINE WITHOUT AURA AND WITHOUT STATUS MIGRAINOSUS: Primary | ICD-10-CM

## 2021-06-17 DIAGNOSIS — M54.12 CERVICAL RADICULOPATHY: ICD-10-CM

## 2021-06-17 DIAGNOSIS — R11.0 NAUSEA: ICD-10-CM

## 2021-06-17 PROCEDURE — 64615 CHEMODENERV MUSC MIGRAINE: CPT | Performed by: PSYCHIATRY & NEUROLOGY

## 2021-06-17 RX ORDER — PROCHLORPERAZINE MALEATE 10 MG
10 TABLET ORAL EVERY 6 HOURS PRN
Qty: 30 TABLET | Refills: 1 | Status: SHIPPED | OUTPATIENT
Start: 2021-06-17 | End: 2021-11-15 | Stop reason: SDUPTHER

## 2021-06-17 RX ORDER — RIZATRIPTAN BENZOATE 10 MG/1
TABLET, ORALLY DISINTEGRATING ORAL
Qty: 12 TABLET | Refills: 1 | Status: SHIPPED | OUTPATIENT
Start: 2021-06-17 | End: 2021-08-10 | Stop reason: SDUPTHER

## 2021-06-17 NOTE — PROGRESS NOTES
Chemodenervation     Date/Time 6/17/2021 1:24 PM     Performed by  Caro Horta DO     Authorized by Caro Horta DO        Pre-procedure details      Prepped With: Alcohol     Anesthesia  (see MAR for exact dosages): Anesthesia method:  None   Procedure details     Position:  Supine and upright   Botox     Botox Type:  Type A    Brand:  Botox    mL's of Botulinum Toxin:  200    mL's of preservative free sterile saline:  2    Final Concentration per CC:  100 units    Needle Gauge:  30 G 2 5 inch   Procedures     Botox Procedures: chronic headache      Indications: migraines     Injection Location      Head / Face:  L superior cervical paraspinal, R superior cervical paraspinal, L , R , R frontalis, L frontalis, L lateral occipitalis, R lateral occipitalis, procerus, R temporalis, L temporalis, L superior trapezius and R superior trapezius    L  injection amount:  5 unit(s)    R  injection amount:  5 unit(s)    L lateral frontalis:  5 unit(s)    R lateral frontalis:  5 unit(s)    L medial frontalis:  5 unit(s)    R medial frontalis:  5 unit(s)    L temporalis injection amount:  40 unit(s)    R temporalis injection amount:  40 unit(s)    Procerus injection amount:  5 unit(s)    L lateral occipitalis injection amount:  15 unit(s)    R lateral occipitalis injection amount:  15 unit(s)    L superior cervical paraspinal injection amount:  10 unit(s)    R superior cervical paraspinal injection amount:  10 unit(s)    L superior trapezius injection amount:  20 unit(s)    R superior trapezius injection amount:  15 unit(s)   Total Units     Total units used:  200    Total units discarded:  0   Post-procedure details      Chemodenervation:  Chronic migraine    Facial Nerve Location[de-identified]  Bilateral facial nerve    Patient tolerance of procedure:   Tolerated well, no immediate complications   Comments      She continues to have less migraines other than the ocular pressure with botox for chronic migraine  She does note elevated BP At times and had significant dizziness with verapamil   Discussed with pt to discuss other BP med options with her PCP as this is not necessarily for migraine but certainly well controlled BP can help with headache control also  Also ordered EMG as she is noting more RUE weakness and radicular pain

## 2021-06-17 NOTE — PATIENT INSTRUCTIONS
Stop verapamil if it is making you too dizzy  Talk to your family doctor about BP medicine    Do not take sumatriptan injection and rizatriptan tab in the same 24 hours  Fioricet -- no more than two days a week

## 2021-06-17 NOTE — TELEPHONE ENCOUNTER
[phone] faxed order to [facility]

## 2021-06-21 DIAGNOSIS — G43.719 INTRACTABLE CHRONIC MIGRAINE WITHOUT AURA AND WITHOUT STATUS MIGRAINOSUS: ICD-10-CM

## 2021-06-22 RX ORDER — NEEDLES, FILTER 19GX1 1/2"
NEEDLE, DISPOSABLE MISCELLANEOUS
Refills: 1 | OUTPATIENT
Start: 2021-06-22

## 2021-06-22 NOTE — TELEPHONE ENCOUNTER
Patient requesting refill of her toradol  Last refill 2/26/21  I have queued this up for you if you are agreeable  Please sign      TY

## 2021-06-24 RX ORDER — SYRINGE W-NEEDLE,DISPOSAB,3 ML 25GX5/8"
SYRINGE, EMPTY DISPOSABLE MISCELLANEOUS
Qty: 4 EACH | Refills: 1 | Status: SHIPPED | OUTPATIENT
Start: 2021-06-24 | End: 2021-10-14 | Stop reason: SDUPTHER

## 2021-06-24 RX ORDER — KETOROLAC TROMETHAMINE 30 MG/ML
INJECTION, SOLUTION INTRAMUSCULAR; INTRAVENOUS
Qty: 4 ML | Refills: 1 | Status: SHIPPED | OUTPATIENT
Start: 2021-06-24 | End: 2021-09-30

## 2021-06-28 ENCOUNTER — TELEPHONE (OUTPATIENT)
Dept: NEUROLOGY | Facility: CLINIC | Age: 71
End: 2021-06-28

## 2021-06-28 NOTE — TELEPHONE ENCOUNTER
So was she using compazine daily? I do not usually recommend this, only a few times per week if needed  Is this the question? Thanks

## 2021-06-28 NOTE — TELEPHONE ENCOUNTER
Patient calling stating that she continues to get headaches  Patient states she was using compazine with a  Regimen by Dr Joel Worrell at [facility] that was not just for nausea, but when she called there, was not able to speak with him or his team and was told that he was no longer with them  I reviewed Dr Nissa Castro note and the instructions were for compazine 10mg q8h prn nausea  Patient upset by this and requesting to see [facility] asap  I advised there was not an appointment available until 8/10  Patient demanding message to be sent to [facility] "high priority" and that she receive an answer asap regarding medication options for her headache  Patient using compazine, toradol injections, sumatriptan injections, oral rizatriptan, Emgality  Possible patient has medication overuse headache? [facility], please advise

## 2021-06-28 NOTE — TELEPHONE ENCOUNTER
I assume she was using it daily, she is already out of the refill provided by Dr Opal Willis on 6/17/21  Patient seemed very upset that I was not able to find the "protocol" she said was set forth by Dr Norris Clayton  I advised the patient that the use of too much compazine can potentially cause abnormal cardiac rhythms and other dangerous adverse reactions that are not something that she would like to potentially have  Patient then stated she just wanted an expedited response from you

## 2021-06-29 NOTE — TELEPHONE ENCOUNTER
Dr Zahraa Rodriguez advised in last note: "After discussion compazine 10 mg tid bridge x 6 days, if not functionally better then admit for disabling chronic migraine, failed 2 bridges "    So the compazine was not meant for daily use indefinitely, rather just for 6 days  We can pursue the headache infusion if agreeable  If she is having nausea rec zofran but again not on a daily basis  Recommend olanzapine qhs prn to break migraine cycle  We can also offer her nurtec every other daily now, if insurance covers  If she wants to do infusion please let me know  Place on wait list   Thanks

## 2021-06-30 NOTE — TELEPHONE ENCOUNTER
Patient calling in  She states she does not understand the issue with her taking compazine as she does not use it daily only when she really needs it  She is not agreeable to infusion as she states it doesn't sound like what's included would work for her anyway  Patient states if there are no other options she can try nurtec every other day but she says this does not work either   She also does not think olanzapine was helpful either but is willing to try that again as long as it is safe with her other medications    Please advise

## 2021-07-07 ENCOUNTER — HOSPITAL ENCOUNTER (EMERGENCY)
Dept: NON INVASIVE DIAGNOSTICS | Facility: HOSPITAL | Age: 71
Discharge: HOME | End: 2021-07-07
Payer: MEDICARE

## 2021-07-07 ENCOUNTER — HOSPITAL ENCOUNTER (EMERGENCY)
Facility: HOSPITAL | Age: 71
Discharge: HOME | End: 2021-07-07
Attending: EMERGENCY MEDICINE | Admitting: EMERGENCY MEDICINE
Payer: MEDICARE

## 2021-07-07 ENCOUNTER — APPOINTMENT (EMERGENCY)
Dept: RADIOLOGY | Facility: HOSPITAL | Age: 71
End: 2021-07-07
Payer: MEDICARE

## 2021-07-07 VITALS
SYSTOLIC BLOOD PRESSURE: 153 MMHG | OXYGEN SATURATION: 94 % | TEMPERATURE: 98.6 F | HEART RATE: 98 BPM | BODY MASS INDEX: 29.68 KG/M2 | WEIGHT: 178.35 LBS | DIASTOLIC BLOOD PRESSURE: 68 MMHG | RESPIRATION RATE: 18 BRPM

## 2021-07-07 DIAGNOSIS — M79.604 RIGHT LEG PAIN: ICD-10-CM

## 2021-07-07 DIAGNOSIS — M25.561 RIGHT KNEE PAIN: Primary | ICD-10-CM

## 2021-07-07 LAB
ANION GAP SERPL CALCULATED.3IONS-SCNC: 4 MMOL/L (ref 4–13)
BASOPHILS # BLD AUTO: 0.04 THOUSANDS/ΜL (ref 0–0.1)
BASOPHILS NFR BLD AUTO: 0 % (ref 0–1)
BUN SERPL-MCNC: 16 MG/DL (ref 5–25)
CALCIUM SERPL-MCNC: 8.9 MG/DL (ref 8.3–10.1)
CHLORIDE SERPL-SCNC: 105 MMOL/L (ref 100–108)
CO2 SERPL-SCNC: 32 MMOL/L (ref 21–32)
CREAT SERPL-MCNC: 1 MG/DL (ref 0.6–1.3)
D DIMER PPP FEU-MCNC: 0.61 UG/ML FEU
EOSINOPHIL # BLD AUTO: 0.09 THOUSAND/ΜL (ref 0–0.61)
EOSINOPHIL NFR BLD AUTO: 1 % (ref 0–6)
ERYTHROCYTE [DISTWIDTH] IN BLOOD BY AUTOMATED COUNT: 12.8 % (ref 11.6–15.1)
GFR SERPL CREATININE-BSD FRML MDRD: 58 ML/MIN/1.73SQ M
GLUCOSE SERPL-MCNC: 113 MG/DL (ref 65–140)
HCT VFR BLD AUTO: 41.9 % (ref 34.8–46.1)
HGB BLD-MCNC: 14 G/DL (ref 11.5–15.4)
IMM GRANULOCYTES # BLD AUTO: 0.04 THOUSAND/UL (ref 0–0.2)
IMM GRANULOCYTES NFR BLD AUTO: 0 % (ref 0–2)
LYMPHOCYTES # BLD AUTO: 2.23 THOUSANDS/ΜL (ref 0.6–4.47)
LYMPHOCYTES NFR BLD AUTO: 25 % (ref 14–44)
MCH RBC QN AUTO: 33.1 PG (ref 26.8–34.3)
MCHC RBC AUTO-ENTMCNC: 33.4 G/DL (ref 31.4–37.4)
MCV RBC AUTO: 99 FL (ref 82–98)
MONOCYTES # BLD AUTO: 0.53 THOUSAND/ΜL (ref 0.17–1.22)
MONOCYTES NFR BLD AUTO: 6 % (ref 4–12)
NEUTROPHILS # BLD AUTO: 6.02 THOUSANDS/ΜL (ref 1.85–7.62)
NEUTS SEG NFR BLD AUTO: 68 % (ref 43–75)
NRBC BLD AUTO-RTO: 0 /100 WBCS
PLATELET # BLD AUTO: 223 THOUSANDS/UL (ref 149–390)
PMV BLD AUTO: 10.7 FL (ref 8.9–12.7)
POTASSIUM SERPL-SCNC: 4 MMOL/L (ref 3.5–5.3)
RBC # BLD AUTO: 4.23 MILLION/UL (ref 3.81–5.12)
SODIUM SERPL-SCNC: 141 MMOL/L (ref 136–145)
WBC # BLD AUTO: 8.95 THOUSAND/UL (ref 4.31–10.16)

## 2021-07-07 PROCEDURE — 85379 FIBRIN DEGRADATION QUANT: CPT | Performed by: PHYSICIAN ASSISTANT

## 2021-07-07 PROCEDURE — 80048 BASIC METABOLIC PNL TOTAL CA: CPT | Performed by: PHYSICIAN ASSISTANT

## 2021-07-07 PROCEDURE — 36415 COLL VENOUS BLD VENIPUNCTURE: CPT | Performed by: PHYSICIAN ASSISTANT

## 2021-07-07 PROCEDURE — 99284 EMERGENCY DEPT VISIT MOD MDM: CPT

## 2021-07-07 PROCEDURE — 99284 EMERGENCY DEPT VISIT MOD MDM: CPT | Performed by: PHYSICIAN ASSISTANT

## 2021-07-07 PROCEDURE — 73564 X-RAY EXAM KNEE 4 OR MORE: CPT

## 2021-07-07 PROCEDURE — 85025 COMPLETE CBC W/AUTO DIFF WBC: CPT | Performed by: PHYSICIAN ASSISTANT

## 2021-07-07 PROCEDURE — 93971 EXTREMITY STUDY: CPT

## 2021-07-07 NOTE — ED NOTES
Pt is at nurses station asking about progress with tests; RN just informed pt that stufy for DVT needs to be performed  Pt asked if this is going to take longer, RN informed pt it would require more time; pt seems to be irritated        Yonatan Dietrich RN  07/07/21 [id number]

## 2021-07-07 NOTE — ED NOTES
Pt ambulated to room, I requested Pt change into a gown and PT said that she has a skirt and doesn't want to change into a gown        Maykel Diaz  07/07/21 [id number]

## 2021-07-07 NOTE — DISCHARGE INSTRUCTIONS
Please refer to the attached information for strict return instructions  If symptoms worsen or new symptoms develop please return to the ER  Please follow up with orthopedics for re-evaluation of symptoms

## 2021-07-07 NOTE — ED NOTES
Pt came to nursing station stating " Is my Dr still here because my scan is done and he needs to read it" Provider notified        Mikal Moreno, FLORY  07/07/21 [id number]

## 2021-07-08 DIAGNOSIS — G43.719 INTRACTABLE CHRONIC MIGRAINE WITHOUT AURA AND WITHOUT STATUS MIGRAINOSUS: ICD-10-CM

## 2021-07-08 PROCEDURE — 93971 EXTREMITY STUDY: CPT | Performed by: SURGERY

## 2021-07-08 RX ORDER — RIZATRIPTAN BENZOATE 10 MG/1
TABLET, ORALLY DISINTEGRATING ORAL
Qty: 12 TABLET | Refills: 1 | OUTPATIENT
Start: 2021-07-08

## 2021-07-08 NOTE — TELEPHONE ENCOUNTER
Patient calling for refill of rizatriptan  Last fill 6/17/21  Please advise if you are willing to refill at this time

## 2021-07-08 NOTE — ED PROVIDER NOTES
History  Chief Complaint   Patient presents with    Leg Pain     Pt reports twisting R ankle yesterday and was seen at urgent care and told she needed an [address] but couldnt get one scheduled for 2 days  Pt states " I would also just like a second opinion"      Leslie Amor is a 73 yo F presenting with R lower leg and ankle pain for the past day  She reports she was climbing the stairs at home yesterday when the pain began, although she denies any trauma or fall prior to onset  Reports she has also noticed swelling to her lower leg/foot  She also notes she has had ongoing pain to her R knee due to arthritis, but also notes this has increased from baseline  She was seen in urgent car yesterday for similar where XR tib/fib and ankle were obtained which were unremarkable  History provided by:  Patient   used: No    Leg Pain  Location:  Leg  Leg location:  R lower leg  Pain details:     Duration:  1 day    Timing:  Constant    Progression:  Waxing and waning  Chronicity:  New  Relieved by:  None tried  Worsened by: Activity  Associated symptoms: swelling    Associated symptoms: no back pain, no decreased ROM, no fever, no neck pain, no numbness, no stiffness and no tingling        Prior to Admission Medications   Prescriptions Last Dose Informant Patient Reported? Taking? ACETAMINOPHEN-BUTALBITAL  MG TABS   No No   Si tab at migraine onset, repeat after 4 hours if needed  Max 2 per day and 3 per week  Cyanocobalamin 1000 MCG/ML KIT   No No   Sig: Inject 1 ml IM once monthly   DULoxetine (CYMBALTA) 30 mg delayed release capsule   Yes No   Sig: daily   Galcanezumab-gnlm (Emgality) 120 MG/ML SOAJ   No No   Si days after loading dose, inject 1 pen subq every 30 days     LORazepam (ATIVAN) 2 mg tablet  Self Yes No   Sig: Take 2 mg by mouth 3 (three) times a day as needed   Melatonin ER 10 MG TBCR   No No   Sig: TAKE 1 TABLET BY MOUTH EVERYDAY AT BEDTIME   Menthol, Topical Analgesic, 16 % LIQD   No No   Sig: Spray on skin prior to injection   OLANZapine (ZyPREXA) 2 5 mg tablet  Self No No   Sig: TAKE 1 TAB AT BEDTIME AS NEEDED MIGRAINE   SUMAtriptan (IMITREX) 6 mg/0 5 mL   No No   Sig: Inject 0 5 mL (6 mg total) under the skin at onset of migraine for up to 1 dose  No more than 2 times per week  Hold all other triptans  SYRINGE-NEEDLE, DISP, 3 ML (BD Integra Syringe) 25G X 1" 3 ML MISC   No No   Sig: USE FOR CYANOCOBALAMIN INJECTION   Syringe/Needle, Disp, (SYRINGE 3CC/25GX1") 25G X 1" 3 ML MISC   No No   Sig: Use syringes for ketorolac injections   albuterol (PROVENTIL HFA,VENTOLIN HFA) 90 mcg/act inhaler   Yes No   Sig: TAKE 2 PUFFS BY MOUTH EVERY 4 TO 6 HOURS AS NEEDED   azelastine (ASTELIN) 0 1 % nasal spray  Self Yes No   Si sprays into each nostril 2 (two) times a day Use in each nostril as directed   baclofen 10 mg tablet   Yes No   Sig: Take 10 mg by mouth 3 (three) times a day as needed   benzonatate (TESSALON) 200 MG capsule   Yes No   Sig: TAKE 1 CAPSULE BY MOUTH THREE TIMES A DAY AS NEEDED FOR COUGH   bisoprolol (ZEBETA) 5 mg tablet  Self Yes No   Sig: Take 5 mg by mouth daily PRN   buPROPion (WELLBUTRIN XL) 300 mg 24 hr tablet  Self No No   Sig: Take 1 tablet (300 mg total) by mouth daily   busPIRone (BUSPAR) 15 mg tablet   Yes No   cetirizine (ZyrTEC) 10 mg tablet  Self Yes No   Sig: Take 10 mg by mouth daily   dicyclomine (BENTYL) 10 mg capsule  Self Yes No   Sig: Take 10 mg by mouth 3 (three) times a day as needed PRN   fluticasone (FLONASE) 50 mcg/act nasal spray  Self Yes No   Si sprays into each nostril daily   gabapentin (NEURONTIN) 300 mg capsule   No No   Sig: Take 2 tabs nightly x 1 wk, then 1 tab AM and 2 tabs nightly x 1 wk, then 1 tab AM, 1 tab afternoon and 2 tabs nightly increase as needed/tolerated   ketorolac (TORADOL) 30 mg/mL injection   No No   Si mL or 30 mg/mL IM injection p r n  migraine, repeat after 6 hours if needed    No more than 2 per day or 3 per week    lidocaine (XYLOCAINE) 5 % ointment   No No   Sig: Apply topically as needed for mild pain   montelukast (SINGULAIR) 10 mg tablet   Yes No   Sig: Take 10 mg by mouth every evening   olopatadine (PATANOL) 0 1 % ophthalmic solution   Yes No   Sig: INSTILL ONE DROP INTO INTO BOTH EYES AS NEEDED   oxyCODONE (OxyCONTIN) 10 mg 12 hr tablet  Self Yes No   Sig: Take 10 mg by mouth every 8 (eight) hours   oxyCODONE-acetaminophen (PERCOCET) 5-325 mg per tablet   Yes No   Sig: PLEASE SEE ATTACHED FOR DETAILED DIRECTIONS   prochlorperazine (COMPAZINE) 10 mg tablet   No No   Sig: Take 1 tablet (10 mg total) by mouth every 6 (six) hours as needed for nausea or vomiting   promethazine (PHENERGAN) 25 mg tablet   No No   Sig: TAKE 1 TAB EVERY 8 HOURS FOR MIGRAINE/NAUSEA AS NEEDED MAX THREE DAYS A WEEK   rOPINIRole (REQUIP) 1 mg tablet  Self Yes No   Sig: Take 4 mg by mouth daily    rizatriptan (MAXALT-MLT) 10 MG disintegrating tablet   No No   Sig: Take one tab at migraine onset can repeat once in two hours  Max two tabs in 24 hours  Max two to three days a week   tiZANidine (ZANAFLEX) 2 mg tablet   No No   Sig: TAKE 1 TABLET BY MOUTH AT BEDTIME AS NEEDED FOR NECK PAIN   Patient not taking: Reported on 2021   verapamil (CALAN) 80 mg tablet   No No   Si tabs q12 hours        Facility-Administered Medications Last Administration Doses Remaining   prochlorperazine (COMPAZINE) injection 5 mg None recorded           Past Medical History:   Diagnosis Date    Allergic rhinitis     Anxiety     Arthritis     Cancer (HCC)     Skin     Chronic pain disorder     knee and back    Chronic prescription opiate use     Depression     GERD (gastroesophageal reflux disease)     Hypertension     Irritable bowel syndrome     Migraines     Spinal stenosis        Past Surgical History:   Procedure Laterality Date    CHOLECYSTECTOMY      COLONOSCOPY      CA REV UPPER EYELID W EXCESS SKIN Bilateral 2020    Procedure: BLEPHAROPLASTY UPPER;  Surgeon: Guillermina Zaragoza MD;  Location: [address] [place] [address];  Service: Plastics    ROTATOR CUFF REPAIR Bilateral        Family History   Problem Relation Age of Onset    Breast cancer Mother     Alzheimer's disease Father     Depression Sister      I have reviewed and agree with the history as documented  E-Cigarette/Vaping    E-Cigarette Use Never User      E-Cigarette/Vaping Substances     Social History     Tobacco Use    Smoking status: Current Every Day Smoker     Packs/day: 0 50     Types: Cigarettes    Smokeless tobacco: Never Used   Vaping Use    Vaping Use: Never used   Substance Use Topics    Alcohol use: Yes     Comment: occ    Drug use: No       Review of Systems   Constitutional: Negative for chills and fever  HENT: Negative for congestion, rhinorrhea and sore throat  Eyes: Negative for pain and visual disturbance  Respiratory: Negative for cough, shortness of breath and wheezing  Cardiovascular: Positive for leg swelling  Negative for chest pain and palpitations  Gastrointestinal: Negative for abdominal pain, nausea and vomiting  Genitourinary: Negative for dysuria, frequency and urgency  Musculoskeletal: Positive for arthralgias  Negative for back pain, neck pain, neck stiffness and stiffness  Skin: Negative for rash and wound  Neurological: Negative for dizziness, weakness, light-headedness and numbness  Physical Exam  Physical Exam  Constitutional:       General: She is not in acute distress  Appearance: She is well-developed  She is not diaphoretic  HENT:      Head: Normocephalic and atraumatic  Right Ear: External ear normal       Left Ear: External ear normal    Eyes:      Conjunctiva/sclera: Conjunctivae normal       Pupils: Pupils are equal, round, and reactive to light  Cardiovascular:      Rate and Rhythm: Normal rate and regular rhythm  Heart sounds: Normal heart sounds  No murmur heard  No friction rub  No gallop  Pulmonary:      Effort: Pulmonary effort is normal  No respiratory distress  Breath sounds: Normal breath sounds  No wheezing  Abdominal:      General: There is no distension  Palpations: Abdomen is soft  Tenderness: There is no abdominal tenderness  Musculoskeletal:         General: Tenderness present  Cervical back: Normal range of motion and neck supple  Comments: TTP to R posterior calf diffusely  Minimal nonpitting RLE edema noted from calf to foot  Negative Rojelio's  2+ dorsalis pedis, posterior tibial pulses to RLE  Intact sensation to lower leg/foot  No bony TTP to knee, normal ROM in flexion/extension  No appreciable joint effusion or increased erythema/warmth  Pain noted with full flexion of knee  Lymphadenopathy:      Cervical: No cervical adenopathy  Skin:     General: Skin is warm and dry  Capillary Refill: Capillary refill takes less than 2 seconds  Findings: No erythema or rash  Neurological:      Mental Status: She is alert and oriented to person, place, and time  Motor: No abnormal muscle tone  Coordination: Coordination normal    Psychiatric:         Behavior: Behavior normal          Thought Content:  Thought content normal          Judgment: Judgment normal          Vital Signs  ED Triage Vitals   Temperature Pulse Respirations Blood Pressure SpO2   07/07/21 1305 07/07/21 1303 07/07/21 1303 07/07/21 1303 07/07/21 1303   98 6 °F (37 °C) 98 18 153/68 94 %      Temp Source Heart Rate Source Patient Position - Orthostatic VS BP Location FiO2 (%)   07/07/21 1303 07/07/21 1303 07/07/21 1303 07/07/21 1303 --   Oral Monitor Sitting Right arm       Pain Score       --                  Vitals:    07/07/21 1303   BP: 153/68   Pulse: 98   Patient Position - Orthostatic VS: Sitting         Visual Acuity      ED Medications  Medications - No data to display    Diagnostic Studies  Results Reviewed     Procedure Component Value Units Date/Time D-dimer, quantitative [[id number]]  (Abnormal) Collected: 07/07/21 1358    Lab Status: Final result Specimen: Blood from Arm, Right Updated: 07/07/21 1431     D-Dimer, Quant 0 61 ug/ml FEU     Basic metabolic panel [[id number]] Collected: 07/07/21 1358    Lab Status: Final result Specimen: Blood from Arm, Right Updated: 07/07/21 1417     Sodium 141 mmol/L      Potassium 4 0 mmol/L      Chloride 105 mmol/L      CO2 32 mmol/L      ANION GAP 4 mmol/L      BUN 16 mg/dL      Creatinine 1 00 mg/dL      Glucose 113 mg/dL      Calcium 8 9 mg/dL      eGFR 58 ml/min/1 73sq m     Narrative:      [place] guidelines for Chronic Kidney Disease (CKD):     Stage 1 with normal or high GFR (GFR > 90 mL/min/1 73 square meters)    Stage 2 Mild CKD (GFR = 60-89 mL/min/1 73 square meters)    Stage 3A Moderate CKD (GFR = 45-59 mL/min/1 73 square meters)    Stage 3B Moderate CKD (GFR = 30-44 mL/min/1 73 square meters)    Stage 4 Severe CKD (GFR = 15-29 mL/min/1 73 square meters)    Stage 5 End Stage CKD (GFR <15 mL/min/1 73 square meters)  Note: GFR calculation is accurate only with a steady state creatinine    CBC and differential [[id number]]  (Abnormal) Collected: 07/07/21 1358    Lab Status: Final result Specimen: Blood from Arm, Right Updated: 07/07/21 1404     WBC 8 95 Thousand/uL      RBC 4 23 Million/uL      Hemoglobin 14 0 g/dL      Hematocrit 41 9 %      MCV 99 fL      MCH 33 1 pg      MCHC 33 4 g/dL      RDW 12 8 %      MPV 10 7 fL      Platelets 551 Thousands/uL      nRBC 0 /100 WBCs      Neutrophils Relative 68 %      Immat GRANS % 0 %      Lymphocytes Relative 25 %      Monocytes Relative 6 %      Eosinophils Relative 1 %      Basophils Relative 0 %      Neutrophils Absolute 6 02 Thousands/µL      Immature Grans Absolute 0 04 Thousand/uL      Lymphocytes Absolute 2 23 Thousands/µL      Monocytes Absolute 0 53 Thousand/µL      Eosinophils Absolute 0 09 Thousand/µL      Basophils Absolute 0 04 Thousands/µL                  VAS lower limb venous duplex study, unilateral/limited   Final Result by Salty Doherty MD (07/08 4564)      XR knee 4+ vw right injury   Final Result by Angela Gutiérrez MD (07/08 6071)      No acute osseous abnormality  Moderate tricompartmental osteoarthritis, most pronounced in the lateral tibiofemoral compartment  Workstation performed: [id number]                    Procedures  Procedures         ED Course                             SBIRT 20yo+      Most Recent Value   SBIRT (24 yo +)   In order to provide better care to our patients, we are screening all of our patients for alcohol and drug use  Would it be okay to ask you these screening questions? No Filed at: 07/07/2021 1641                    [facility]  Number of Diagnoses or Management Options  Right knee pain  Right leg pain  Diagnosis management comments: Right lower leg pain/swelling with onset while climbing the stairs yesterday, however no significant injury/trauma  Xrays R tib/fib and ankle yesterday negative  Pain to R knee which is consistent with baseline arthritic pain however worse over past day  Suspect msk origin, however will check XR R knee, obtain d-dimer for exclusion of DVT given low risk by [facility]  Consider duplex US if d dimer positive for DVT exclusion         Amount and/or Complexity of Data Reviewed  Tests in the radiology section of CPT®: ordered    Patient Progress  Patient progress: stable      Disposition  Final diagnoses:   Right knee pain   Right leg pain     Time reflects when diagnosis was documented in both MDM as applicable and the Disposition within this note     Time User Action Codes Description Comment    7/7/2021  4:39 PM Ivy Horner [M25 [id number]] Right knee pain     7/7/2021  4:39 PM Ivy Horner [Y91 [id number]] Right leg pain       ED Disposition     ED Disposition Condition Date/Time Comment    Discharge Stable Wed Jul 7, 2021  4:39 PM [facility] discharge to home/self care  Follow-up Information     Follow up With Specialties Details Why Contact Info Additional [phone] [facility] Orthopedic Surgery Schedule an appointment as soon as possible for a visit   [address] [facility] [address]  [address] [facility] access hospital, [address], [address] [facility], [place], [place], [address]   [facility] Emergency Department Emergency Medicine  If symptoms worsen [facility] [id number]  [address] [place] [address] Emergency Department, [address] [facility] , [place], [place], [address]          Discharge Medication List as of 7/7/2021  4:40 PM      CONTINUE these medications which have NOT CHANGED    Details   ACETAMINOPHEN-BUTALBITAL  MG TABS 1 tab at migraine onset, repeat after 4 hours if needed   Max 2 per day and 3 per week , Normal      albuterol (PROVENTIL HFA,VENTOLIN HFA) 90 mcg/act inhaler TAKE 2 PUFFS BY MOUTH EVERY 4 TO 6 HOURS AS NEEDED, Historical Med      azelastine (ASTELIN) 0 1 % nasal spray 2 sprays into each nostril 2 (two) times a day Use in each nostril as directed, Historical Med      baclofen 10 mg tablet Take 10 mg by mouth 3 (three) times a day as needed, Starting Mon 12/21/2020, Historical Med      benzonatate (TESSALON) 200 MG capsule TAKE 1 CAPSULE BY MOUTH THREE TIMES A DAY AS NEEDED FOR COUGH, Historical Med      bisoprolol (ZEBETA) 5 mg tablet Take 5 mg by mouth daily PRN, Historical Med      buPROPion (WELLBUTRIN XL) 300 mg 24 hr tablet Take 1 tablet (300 mg total) by mouth daily, Starting Tue 3/24/2020, Normal      busPIRone (BUSPAR) 15 mg tablet Starting Wed 2/10/2021, Historical Med      cetirizine (ZyrTEC) 10 mg tablet Take 10 mg by mouth daily, Historical Med      Cyanocobalamin 1000 MCG/ML KIT Inject 1 ml IM once monthly, Normal      dicyclomine (BENTYL) 10 mg capsule Take 10 mg by mouth 3 (three) times a day as needed PRN, Historical Med      DULoxetine (CYMBALTA) 30 mg delayed release capsule daily, Starting Mon 10/5/2020, Historical Med      fluticasone (FLONASE) 50 mcg/act nasal spray 2 sprays into each nostril daily, Historical Med      gabapentin (NEURONTIN) 300 mg capsule Take 2 tabs nightly x 1 wk, then 1 tab AM and 2 tabs nightly x 1 wk, then 1 tab AM, 1 tab afternoon and 2 tabs nightly increase as needed/tolerated, Normal      Galcanezumab-gnlm (Emgality) 120 MG/ML SOAJ 30 days after loading dose, inject 1 pen subq every 30 days  , Normal      ketorolac (TORADOL) 30 mg/mL injection 1 mL or 30 mg/mL IM injection p r n  migraine, repeat after 6 hours if needed    No more than 2 per day or 3 per week , Normal      lidocaine (XYLOCAINE) 5 % ointment Apply topically as needed for mild pain, Starting Wed 2/17/2021, Normal      LORazepam (ATIVAN) 2 mg tablet Take 2 mg by mouth 3 (three) times a day as needed, Starting Wed 7/29/2020, Historical Med      Melatonin ER 10 MG TBCR TAKE 1 TABLET BY MOUTH EVERYDAY AT BEDTIME, Normal      Menthol, Topical Analgesic, 16 % LIQD Spray on skin prior to injection, Normal      montelukast (SINGULAIR) 10 mg tablet Take 10 mg by mouth every evening, Starting Tue 12/1/2020, Historical Med      OLANZapine (ZyPREXA) 2 5 mg tablet TAKE 1 TAB AT BEDTIME AS NEEDED MIGRAINE, Normal      olopatadine (PATANOL) 0 1 % ophthalmic solution INSTILL ONE DROP INTO INTO BOTH EYES AS NEEDED, Historical Med      oxyCODONE (OxyCONTIN) 10 mg 12 hr tablet Take 10 mg by mouth every 8 (eight) hours, Historical Med      oxyCODONE-acetaminophen (PERCOCET) 5-325 mg per tablet PLEASE SEE ATTACHED FOR DETAILED DIRECTIONS, Historical Med      prochlorperazine (COMPAZINE) 10 mg tablet Take 1 tablet (10 mg total) by mouth every 6 (six) hours as needed for nausea or vomiting, Starting Thu 6/17/2021, Normal      promethazine (PHENERGAN) 25 mg tablet TAKE 1 TAB EVERY 8 HOURS FOR MIGRAINE/NAUSEA AS NEEDED MAX THREE DAYS A WEEK, Normal      rizatriptan (MAXALT-MLT) 10 MG disintegrating tablet Take one tab at migraine onset can repeat once in two hours  Max two tabs in 24 hours  Max two to three days a week, Normal      rOPINIRole (REQUIP) 1 mg tablet Take 4 mg by mouth daily , Historical Med      SUMAtriptan (IMITREX) 6 mg/0 5 mL Inject 0 5 mL (6 mg total) under the skin at onset of migraine for up to 1 dose  No more than 2 times per week  Hold all other triptans  , Normal      !! SYRINGE-NEEDLE, DISP, 3 ML (BD Integra Syringe) 25G X 1" 3 ML MISC USE FOR CYANOCOBALAMIN INJECTION, Phone In      !! Syringe/Needle, Disp, (SYRINGE 3CC/25GX1") 25G X 1" 3 ML MISC Use syringes for ketorolac injections, Normal      tiZANidine (ZANAFLEX) 2 mg tablet TAKE 1 TABLET BY MOUTH AT BEDTIME AS NEEDED FOR NECK PAIN, Normal      verapamil (CALAN) 80 mg tablet 2 tabs q12 hours  , Normal       !! - Potential duplicate medications found  Please discuss with provider  No discharge procedures on file      PDMP Review     None          ED Provider  Electronically Signed by           Ines Durand PA-C  07/08/21 2682

## 2021-07-12 NOTE — TELEPHONE ENCOUNTER
Called and left a message for pt to make her aware that she should still have refills and cannot have another refill sent until closer to 8/17/21   I asked her to call back with any questions

## 2021-07-13 NOTE — TELEPHONE ENCOUNTER
Patient calling in regarding rizatriptan refill  I made her aware last prescription was sent in on 6/17/21 with 1 additional refill       Patient will check in with pharmacy for refill of medication

## 2021-07-21 ENCOUNTER — TELEPHONE (OUTPATIENT)
Dept: NEUROLOGY | Facility: CLINIC | Age: 71
End: 2021-07-21

## 2021-07-23 NOTE — TELEPHONE ENCOUNTER
Called and Left a message on pt's answering machine for a call back    Letter mailed home     Janine

## 2021-08-05 ENCOUNTER — TELEPHONE (OUTPATIENT)
Dept: NEUROLOGY | Facility: CLINIC | Age: 71
End: 2021-08-05

## 2021-08-05 NOTE — TELEPHONE ENCOUNTER
Patient called office and left vm requesting a call back  Called patient, went straight to    Left message for return call to office

## 2021-08-10 ENCOUNTER — OFFICE VISIT (OUTPATIENT)
Dept: NEUROLOGY | Facility: CLINIC | Age: 71
End: 2021-08-10
Payer: MEDICARE

## 2021-08-10 VITALS
RESPIRATION RATE: 18 BRPM | HEART RATE: 68 BPM | BODY MASS INDEX: 29.39 KG/M2 | WEIGHT: 176.4 LBS | DIASTOLIC BLOOD PRESSURE: 67 MMHG | HEIGHT: 65 IN | SYSTOLIC BLOOD PRESSURE: 139 MMHG | TEMPERATURE: 97.5 F

## 2021-08-10 DIAGNOSIS — G43.719 INTRACTABLE CHRONIC MIGRAINE WITHOUT AURA AND WITHOUT STATUS MIGRAINOSUS: Primary | ICD-10-CM

## 2021-08-10 DIAGNOSIS — F17.210 CIGARETTE SMOKER: ICD-10-CM

## 2021-08-10 DIAGNOSIS — F51.01 PRIMARY INSOMNIA: ICD-10-CM

## 2021-08-10 DIAGNOSIS — M48.02 FORAMINAL STENOSIS OF CERVICAL REGION: ICD-10-CM

## 2021-08-10 DIAGNOSIS — M50.30 DEGENERATIVE DISC DISEASE, CERVICAL: ICD-10-CM

## 2021-08-10 DIAGNOSIS — I10 HYPERTENSION: ICD-10-CM

## 2021-08-10 PROCEDURE — 99214 OFFICE O/P EST MOD 30 MIN: CPT | Performed by: PHYSICIAN ASSISTANT

## 2021-08-10 RX ORDER — CYCLOSPORINE 0.5 MG/ML
EMULSION OPHTHALMIC
COMMUNITY
Start: 2021-06-06 | End: 2021-09-09

## 2021-08-10 RX ORDER — LOSARTAN POTASSIUM 50 MG/1
50 TABLET ORAL DAILY
COMMUNITY
Start: 2021-07-14

## 2021-08-10 RX ORDER — GALCANEZUMAB 120 MG/ML
INJECTION, SOLUTION SUBCUTANEOUS
Qty: 2 ML | Refills: 0 | Status: SHIPPED | OUTPATIENT
Start: 2021-08-10

## 2021-08-10 RX ORDER — METHOCARBAMOL 750 MG/1
TABLET, FILM COATED ORAL
COMMUNITY
Start: 2021-07-15 | End: 2021-08-10 | Stop reason: ALTCHOICE

## 2021-08-10 RX ORDER — NALOXEGOL OXALATE 25 MG/1
25 TABLET, FILM COATED ORAL DAILY
COMMUNITY
Start: 2021-07-01 | End: 2021-08-10 | Stop reason: ALTCHOICE

## 2021-08-10 RX ORDER — NALOXEGOL OXALATE 25 MG/1
25 TABLET, FILM COATED ORAL DAILY
COMMUNITY
Start: 2021-08-03

## 2021-08-10 RX ORDER — CYANOCOBALAMIN 1000 UG/ML
INJECTION INTRAMUSCULAR; SUBCUTANEOUS
COMMUNITY
Start: 2021-07-29 | End: 2021-09-21

## 2021-08-10 RX ORDER — METHOCARBAMOL 500 MG/1
500 TABLET, FILM COATED ORAL 3 TIMES DAILY PRN
COMMUNITY
Start: 2021-07-06 | End: 2021-08-10 | Stop reason: ALTCHOICE

## 2021-08-10 RX ORDER — RIZATRIPTAN BENZOATE 10 MG/1
TABLET, ORALLY DISINTEGRATING ORAL
Qty: 12 TABLET | Refills: 2 | Status: SHIPPED | OUTPATIENT
Start: 2021-08-10 | End: 2021-12-07 | Stop reason: SDUPTHER

## 2021-08-10 RX ORDER — GALCANEZUMAB 120 MG/ML
INJECTION, SOLUTION SUBCUTANEOUS
Qty: 1 ML | Refills: 11 | Status: SHIPPED | OUTPATIENT
Start: 2021-08-10 | End: 2021-09-30 | Stop reason: SDUPTHER

## 2021-08-10 RX ORDER — AMLODIPINE BESYLATE 5 MG/1
5 TABLET ORAL DAILY
COMMUNITY
Start: 2021-06-22 | End: 2021-09-09

## 2021-08-10 RX ORDER — BUTALBITAL, ASPIRIN, AND CAFFEINE 50; 325; 40 MG/1; MG/1; MG/1
CAPSULE ORAL
COMMUNITY
End: 2021-09-09

## 2021-09-09 ENCOUNTER — APPOINTMENT (EMERGENCY)
Dept: CT IMAGING | Facility: HOSPITAL | Age: 71
End: 2021-09-09

## 2021-09-09 ENCOUNTER — HOSPITAL ENCOUNTER (EMERGENCY)
Facility: HOSPITAL | Age: 71
Discharge: HOME | End: 2021-09-09
Attending: EMERGENCY MEDICINE
Payer: OTHER MISCELLANEOUS

## 2021-09-09 VITALS
DIASTOLIC BLOOD PRESSURE: 70 MMHG | HEART RATE: 69 BPM | RESPIRATION RATE: 16 BRPM | SYSTOLIC BLOOD PRESSURE: 157 MMHG | OXYGEN SATURATION: 98 % | TEMPERATURE: 98.1 F | WEIGHT: 172.4 LBS | BODY MASS INDEX: 28.69 KG/M2

## 2021-09-09 DIAGNOSIS — M54.2 NECK PAIN: Primary | ICD-10-CM

## 2021-09-09 DIAGNOSIS — M54.12 CERVICAL RADICULOPATHY: ICD-10-CM

## 2021-09-09 PROCEDURE — 72125 CT NECK SPINE W/O DYE: CPT

## 2021-09-09 PROCEDURE — 99284 EMERGENCY DEPT VISIT MOD MDM: CPT

## 2021-09-09 PROCEDURE — 99284 EMERGENCY DEPT VISIT MOD MDM: CPT | Performed by: PHYSICIAN ASSISTANT

## 2021-09-09 PROCEDURE — G1004 CDSM NDSC: HCPCS

## 2021-09-09 PROCEDURE — 96372 THER/PROPH/DIAG INJ SC/IM: CPT

## 2021-09-09 RX ORDER — HYDROMORPHONE HYDROCHLORIDE 2 MG/1
2 TABLET ORAL ONCE
Status: COMPLETED | OUTPATIENT
Start: 2021-09-09 | End: 2021-09-09

## 2021-09-09 RX ORDER — KETOROLAC TROMETHAMINE 10 MG/1
10 TABLET, FILM COATED ORAL EVERY 6 HOURS PRN
Qty: 15 TABLET | Refills: 0 | Status: SHIPPED | OUTPATIENT
Start: 2021-09-09 | End: 2021-09-30

## 2021-09-09 RX ORDER — LIDOCAINE 50 MG/G
1 PATCH TOPICAL ONCE
Status: DISCONTINUED | OUTPATIENT
Start: 2021-09-09 | End: 2021-09-09 | Stop reason: HOSPADM

## 2021-09-09 RX ORDER — LIDOCAINE 40 MG/G
CREAM TOPICAL AS NEEDED
Qty: 30 G | Refills: 0 | Status: SHIPPED | OUTPATIENT
Start: 2021-09-09

## 2021-09-09 RX ORDER — METHOCARBAMOL 500 MG/1
1000 TABLET, FILM COATED ORAL ONCE
Status: COMPLETED | OUTPATIENT
Start: 2021-09-09 | End: 2021-09-09

## 2021-09-09 RX ORDER — KETOROLAC TROMETHAMINE 30 MG/ML
15 INJECTION, SOLUTION INTRAMUSCULAR; INTRAVENOUS ONCE
Status: COMPLETED | OUTPATIENT
Start: 2021-09-09 | End: 2021-09-09

## 2021-09-09 RX ORDER — METHOCARBAMOL 500 MG/1
500 TABLET, FILM COATED ORAL 3 TIMES DAILY PRN
Qty: 20 TABLET | Refills: 0 | Status: SHIPPED | OUTPATIENT
Start: 2021-09-09 | End: 2022-01-27

## 2021-09-09 RX ADMIN — LIDOCAINE 1 PATCH: 50 PATCH CUTANEOUS at 13:34

## 2021-09-09 RX ADMIN — HYDROMORPHONE HYDROCHLORIDE 2 MG: 2 TABLET ORAL at 15:47

## 2021-09-09 RX ADMIN — KETOROLAC TROMETHAMINE 15 MG: 30 INJECTION, SOLUTION INTRAMUSCULAR; INTRAVENOUS at 13:34

## 2021-09-09 RX ADMIN — LIDOCAINE 1 PATCH: 50 PATCH CUTANEOUS at 13:49

## 2021-09-09 RX ADMIN — METHOCARBAMOL 1000 MG: 500 TABLET ORAL at 13:37

## 2021-09-09 NOTE — ED NOTES
RN informed patient that in order to give pain medication, pt needs to find a ride home   Pt informed RN that she will have a ride home and was making calls to friends     Marlon Mello RN  09/09/21 1600

## 2021-09-09 NOTE — Clinical Note
Sam Blake was seen and treated in our emergency department on 9/9/2021  Diagnosis:     [address]    She may return on this date: 09/16/2021         If you have any questions or concerns, please don't hesitate to call        Faustino Vogt PA-C    ______________________________           _______________          _______________  Hospital Representative                              Date                                Time

## 2021-09-09 NOTE — DISCHARGE INSTRUCTIONS
Please refer to the attached information for strict return instructions  If symptoms worsen or new symptoms develop please return to the ER  Please follow up with spine/pain specialist for further evaluation and medication refills

## 2021-09-10 NOTE — ED PROVIDER NOTES
History  Chief Complaint   Patient presents with    Neck Pain     pt states she injured herself at work on  but has chronic issues with bulging discs  states right sided neck pain that radiates into shoulder and down right arm   states pain continues  Katerin Valadez is a 73 yo F, history of arthritis, chronic pain disorder, cervical radiculopathy, presenting with three days of mid and R sided neck pain  She reports she was lifting a patient at her job when the patient fell back towards the bed and in an attempt to catch them she had sudden onset of R sided neck pain  Reports radiation of pain into RUE to level of elbow  Patient does follow with pain management at [facility] for similar, although reports this pain is somewhat worse and different from previous  She takes oxycodone as well as valium at home as needed for pain/spasm  She denies chest pain or dyspnea  Denies numbness, tingling, or weakness in extremities  History provided by:  Patient   used: No    Neck Pain  Pain location:  R side  Quality:  Shooting and stabbing  Pain radiates to:  R arm  Duration:  3 days  Timing:  Constant  Progression:  Waxing and waning  Chronicity:  Recurrent  Context: lifting a heavy object    Relieved by:  None tried  Worsened by:  Nothing  Ineffective treatments:  None tried  Associated symptoms: no bladder incontinence, no bowel incontinence, no chest pain, no fever, no headaches, no leg pain, no numbness, no syncope, no tingling and no weakness        Prior to Admission Medications   Prescriptions Last Dose Informant Patient Reported? Taking? ACETAMINOPHEN-BUTALBITAL  MG TABS   No No   Si tab at migraine onset, repeat after 4 hours if needed  Max 2 per day and 3 per week  Cyanocobalamin 1000 MCG/ML KIT   No No   Sig: Inject 1 ml IM once monthly   Galcanezumab-gnlm (Emgality) 120 MG/ML SOAJ   No No   Si days after loading dose, inject 1 pen subq every 30 days     Ailyn Salas (Emgality) 120 MG/ML SOAJ   No No   Sig: One ml subcutaneous on the right thigh and 1 ml subcutaneous on the left thigh at the same time for 1 dose   LORazepam (ATIVAN) 2 mg tablet  Self Yes No   Sig: Take 2 mg by mouth 3 (three) times a day as needed   Melatonin ER 10 MG TBCR   No No   Sig: TAKE 1 TABLET BY MOUTH EVERYDAY AT BEDTIME   Movantik 25 MG tablet   Yes No   Sig: Take 25 mg by mouth daily   SYRINGE-NEEDLE, DISP, 3 ML (BD Integra Syringe) 25G X 1" 3 ML MISC   No No   Sig: USE FOR CYANOCOBALAMIN INJECTION   Syringe/Needle, Disp, (SYRINGE 3CC/25GX1") 25G X 1" 3 ML MISC   No No   Sig: Use syringes for ketorolac injections   azelastine (ASTELIN) 0 1 % nasal spray  Self Yes No   Si sprays into each nostril 2 (two) times a day Use in each nostril as directed   buPROPion (WELLBUTRIN XL) 300 mg 24 hr tablet  Self No No   Sig: Take 1 tablet (300 mg total) by mouth daily   cetirizine (ZyrTEC) 10 mg tablet  Self Yes No   Sig: Take 10 mg by mouth daily   cyanocobalamin 1,000 mcg/mL   Yes No   Si ML INTRAMUSCULARLY ONCE MONTHLY   dicyclomine (BENTYL) 10 mg capsule  Self Yes No   Sig: Take 10 mg by mouth 3 (three) times a day as needed PRN   gabapentin (NEURONTIN) 300 mg capsule   No No   Sig: Take 2 tabs nightly x 1 wk, then 1 tab AM and 2 tabs nightly x 1 wk, then 1 tab AM, 1 tab afternoon and 2 tabs nightly increase as needed/tolerated   Patient taking differently: Take 300 mg by mouth daily at bedtime Take 2 tabs nightly x 1 wk, then 1 tab AM and 2 tabs nightly x 1 wk, then 1 tab AM, 1 tab afternoon and 2 tabs nightly increase as needed/tolerated   ketorolac (TORADOL) 30 mg/mL injection   No No   Si mL or 30 mg/mL IM injection p r n  migraine, repeat after 6 hours if needed  No more than 2 per day or 3 per week     losartan (COZAAR) 50 mg tablet   Yes No   Sig: Take 50 mg by mouth daily   montelukast (SINGULAIR) 10 mg tablet   Yes No   Sig: Take 10 mg by mouth every evening   Patient not taking: Reported on 8/10/2021   olopatadine (PATANOL) 0 1 % ophthalmic solution   Yes No   Sig: INSTILL ONE DROP INTO INTO BOTH EYES AS NEEDED   oxyCODONE (OxyCONTIN) 10 mg 12 hr tablet  Self Yes No   Sig: Take 15 mg by mouth 2 (two) times a day    prochlorperazine (COMPAZINE) 10 mg tablet   No No   Sig: Take 1 tablet (10 mg total) by mouth every 6 (six) hours as needed for nausea or vomiting   promethazine (PHENERGAN) 25 mg tablet   No No   Sig: TAKE 1 TAB EVERY 8 HOURS FOR MIGRAINE/NAUSEA AS NEEDED MAX THREE DAYS A WEEK   rOPINIRole (REQUIP) 1 mg tablet  Self Yes No   Sig: Take 4 mg by mouth daily    rizatriptan (MAXALT-MLT) 10 MG disintegrating tablet   No No   Sig: Take one tab at migraine onset can repeat once in two hours  Max two tabs in 24 hours  Max two to three days a week   sertraline (ZOLOFT) 50 mg tablet   Yes No   Sig: Take 50 mg by mouth daily   verapamil (CALAN) 80 mg tablet   No No   Sig: TAKE 2 TABLETS BY MOUTH EVERY 12 HOURS      Facility-Administered Medications Last Administration Doses Remaining   prochlorperazine (COMPAZINE) injection 5 mg None recorded           Past Medical History:   Diagnosis Date    Allergic rhinitis     Anxiety     Arthritis     Cancer (HCC)     Skin     Chronic pain disorder     knee and back    Chronic prescription opiate use     Depression     GERD (gastroesophageal reflux disease)     Hypertension     Irritable bowel syndrome     Migraines     Spinal stenosis        Past Surgical History:   Procedure Laterality Date    CHOLECYSTECTOMY      COLONOSCOPY      NE REV UPPER EYELID W EXCESS SKIN Bilateral 1/29/2020    Procedure: BLEPHAROPLASTY UPPER;  Surgeon: Terrie Sheth MD;  Location: [address] [place] [address];  Service: Plastics    ROTATOR CUFF REPAIR Bilateral        Family History   Problem Relation Age of Onset    Breast cancer Mother     Alzheimer's disease Father     Depression Sister      I have reviewed and agree with the history as documented      E-Cigarette/Vaping    E-Cigarette Use Never User      E-Cigarette/Vaping Substances     Social History     Tobacco Use    Smoking status: Current Every Day Smoker     Packs/day: 0 50     Types: Cigarettes    Smokeless tobacco: Never Used   Vaping Use    Vaping Use: Never used   Substance Use Topics    Alcohol use: Yes     Comment: occ    Drug use: No       Review of Systems   Constitutional: Negative for chills and fever  HENT: Negative for congestion, rhinorrhea and sore throat  Eyes: Negative for pain and visual disturbance  Respiratory: Negative for cough, shortness of breath and wheezing  Cardiovascular: Negative for chest pain, palpitations and syncope  Gastrointestinal: Negative for abdominal pain, bowel incontinence, nausea and vomiting  Genitourinary: Negative for bladder incontinence, dysuria, frequency and urgency  Musculoskeletal: Positive for neck pain  Negative for back pain and neck stiffness  Skin: Negative for rash and wound  Neurological: Negative for dizziness, tingling, weakness, light-headedness, numbness and headaches  Physical Exam  Physical Exam  Constitutional:       General: She is not in acute distress  Appearance: She is well-developed  She is not diaphoretic  HENT:      Head: Normocephalic and atraumatic  Right Ear: External ear normal       Left Ear: External ear normal    Eyes:      Conjunctiva/sclera: Conjunctivae normal       Pupils: Pupils are equal, round, and reactive to light  Cardiovascular:      Rate and Rhythm: Normal rate and regular rhythm  Heart sounds: Normal heart sounds  No murmur heard  No friction rub  No gallop  Pulmonary:      Effort: Pulmonary effort is normal  No respiratory distress  Breath sounds: Normal breath sounds  No wheezing  Abdominal:      General: There is no distension  Palpations: Abdomen is soft  Tenderness: There is no abdominal tenderness  Musculoskeletal:         General: Tenderness present  Cervical back: Normal range of motion and neck supple  Comments: TTP to midline and R paraspinal c-spine over approximately level of C6/C7  No overlying rashes/lesions or bony step off/deformity  5/5 strength, intact symmetric sensation to b/l UE  Lymphadenopathy:      Cervical: No cervical adenopathy  Skin:     General: Skin is warm and dry  Capillary Refill: Capillary refill takes less than 2 seconds  Findings: No erythema or rash  Neurological:      Mental Status: She is alert and oriented to person, place, and time  Motor: No abnormal muscle tone  Coordination: Coordination normal    Psychiatric:         Behavior: Behavior normal          Thought Content: Thought content normal          Judgment: Judgment normal          Vital Signs  ED Triage Vitals [09/09/21 1151]   Temperature Pulse Respirations Blood Pressure SpO2   98 1 °F (36 7 °C) 69 16 157/70 98 %      Temp Source Heart Rate Source Patient Position - Orthostatic VS BP Location FiO2 (%)   Oral -- -- -- --      Pain Score       Worst Possible Pain           Vitals:    09/09/21 1151   BP: 157/70   Pulse: 69         Visual Acuity      ED Medications  Medications   ketorolac (TORADOL) injection 15 mg (15 mg Intramuscular Given 9/9/21 1334)   methocarbamol (ROBAXIN) tablet 1,000 mg (1,000 mg Oral Given 9/9/21 1337)   HYDROmorphone (DILAUDID) tablet 2 mg (2 mg Oral Given 9/9/21 1547)       Diagnostic Studies  Results Reviewed     None                 CT spine cervical without contrast   Final Result by Cat Barber MD (09/09 1543)      No cervical spine fracture or traumatic malalignment                     Workstation performed: [id number]                    Procedures  Procedures         ED Course                                           MDM  Number of Diagnoses or Management Options  Cervical radiculopathy  Neck pain  Diagnosis management comments: History of chronic pain, cervical degenerative disc disease, now with worsening neck pain radiating to RUE after injury at work three days ago  TTP to midline and R sided lower c-spine on exam  Intact upper extremity neuro exam  Will check CT c-spine for acute osseous abnormality given injury and history  Will treat pain here initially with toradol, robaxin, lidocaine patch  Explained to patient can give narcotic pain control here if needed but given that she follows with pain management will defer any further at home narcotic pain prescriptions  Patient verbalizes understanding of same  Amount and/or Complexity of Data Reviewed  Tests in the radiology section of CPT®: ordered and reviewed    Patient Progress  Patient progress: stable      Disposition  Final diagnoses:   Neck pain   Cervical radiculopathy     Time reflects when diagnosis was documented in both MDM as applicable and the Disposition within this note     Time User Action Codes Description Comment    9/9/2021  3:59 PM Shahla Given Add [M54 2] Neck pain     9/9/2021  3:59 PM Shahla Given Add [M54 12] Cervical radiculopathy       ED Disposition     ED Disposition Condition Date/Time Comment    Discharge Stable Thu Sep 9, 2021  3:59 PM Kurt Bauman discharge to home/self care              Follow-up Information     Follow up With Specialties Details Why Contact Info Additional Information    [facility] Radiology Schedule an appointment as soon as possible for a visit   [address] [place] [address] [place] [address]  [phone] [facility] and [address] [facility], [address] [facility], [address] [place], [place], [address] [facility]          Discharge Medication List as of 9/9/2021  4:25 PM      START taking these medications    Details   ketorolac (TORADOL) 10 mg tablet Take 1 tablet (10 mg total) by mouth every 6 (six) hours as needed for moderate pain or severe pain for up to 4 days, Starting Thu 9/9/2021, Until Mon 9/13/2021 at 2359, Normal lidocaine (LMX) 4 % cream Apply topically as needed for mild pain or moderate pain, Starting u 9/9/2021, Normal         CONTINUE these medications which have NOT CHANGED    Details   ACETAMINOPHEN-BUTALBITAL  MG TABS 1 tab at migraine onset, repeat after 4 hours if needed  Max 2 per day and 3 per week , Normal      azelastine (ASTELIN) 0 1 % nasal spray 2 sprays into each nostril 2 (two) times a day Use in each nostril as directed, Historical Med      buPROPion (WELLBUTRIN XL) 300 mg 24 hr tablet Take 1 tablet (300 mg total) by mouth daily, Starting Tue 3/24/2020, Normal      cetirizine (ZyrTEC) 10 mg tablet Take 10 mg by mouth daily, Historical Med      cyanocobalamin 1,000 mcg/mL 1 ML INTRAMUSCULARLY ONCE MONTHLY, Historical Med      Cyanocobalamin 1000 MCG/ML KIT Inject 1 ml IM once monthly, Normal      dicyclomine (BENTYL) 10 mg capsule Take 10 mg by mouth 3 (three) times a day as needed PRN, Historical Med      gabapentin (NEURONTIN) 300 mg capsule Take 2 tabs nightly x 1 wk, then 1 tab AM and 2 tabs nightly x 1 wk, then 1 tab AM, 1 tab afternoon and 2 tabs nightly increase as needed/tolerated, Normal      !! Galcanezumab-gnlm (Emgality) 120 MG/ML SOAJ 30 days after loading dose, inject 1 pen subq every 30 days  , Normal      !! Galcanezumab-gnlm (Emgality) 120 MG/ML SOAJ One ml subcutaneous on the right thigh and 1 ml subcutaneous on the left thigh at the same time for 1 dose, Normal      ketorolac (TORADOL) 30 mg/mL injection 1 mL or 30 mg/mL IM injection p r n  migraine, repeat after 6 hours if needed    No more than 2 per day or 3 per week , Normal      LORazepam (ATIVAN) 2 mg tablet Take 2 mg by mouth 3 (three) times a day as needed, Starting Wed 7/29/2020, Historical Med      losartan (COZAAR) 50 mg tablet Take 50 mg by mouth daily, Starting Wed 7/14/2021, Historical Med      Melatonin ER 10 MG TBCR TAKE 1 TABLET BY MOUTH EVERYDAY AT BEDTIME, Normal      montelukast (SINGULAIR) 10 mg tablet Take 10 mg by mouth every evening, Starting Tue 12/1/2020, Historical Med      Movantik 25 MG tablet Take 25 mg by mouth daily, Starting Tue 8/3/2021, Historical Med      olopatadine (PATANOL) 0 1 % ophthalmic solution INSTILL ONE DROP INTO INTO BOTH EYES AS NEEDED, Historical Med      oxyCODONE (OxyCONTIN) 10 mg 12 hr tablet Take 15 mg by mouth 2 (two) times a day , Historical Med      prochlorperazine (COMPAZINE) 10 mg tablet Take 1 tablet (10 mg total) by mouth every 6 (six) hours as needed for nausea or vomiting, Starting u 6/17/2021, Normal      promethazine (PHENERGAN) 25 mg tablet TAKE 1 TAB EVERY 8 HOURS FOR MIGRAINE/NAUSEA AS NEEDED MAX THREE DAYS A WEEK, Normal      rizatriptan (MAXALT-MLT) 10 MG disintegrating tablet Take one tab at migraine onset can repeat once in two hours  Max two tabs in 24 hours  Max two to three days a week, Normal      rOPINIRole (REQUIP) 1 mg tablet Take 4 mg by mouth daily , Historical Med      sertraline (ZOLOFT) 50 mg tablet Take 50 mg by mouth daily, Starting Sat 7/31/2021, Historical Med      !! SYRINGE-NEEDLE, DISP, 3 ML (BD Integra Syringe) 25G X 1" 3 ML MISC USE FOR CYANOCOBALAMIN INJECTION, Phone In      !! Syringe/Needle, Disp, (SYRINGE 3CC/25GX1") 25G X 1" 3 ML MISC Use syringes for ketorolac injections, Normal      verapamil (CALAN) 80 mg tablet TAKE 2 TABLETS BY MOUTH EVERY 12 HOURS, Normal       !! - Potential duplicate medications found  Please discuss with provider  No discharge procedures on file      PDMP Review     None          ED Provider  Electronically Signed by           Fabby Canas PA-C  09/10/21 7998

## 2021-09-14 DIAGNOSIS — F51.01 PRIMARY INSOMNIA: ICD-10-CM

## 2021-09-14 DIAGNOSIS — G43.719 INTRACTABLE CHRONIC MIGRAINE WITHOUT AURA AND WITHOUT STATUS MIGRAINOSUS: ICD-10-CM

## 2021-09-14 RX ORDER — GABAPENTIN 300 MG/1
300 CAPSULE ORAL 2 TIMES DAILY
Qty: 60 CAPSULE | Refills: 3 | Status: SHIPPED | OUTPATIENT
Start: 2021-09-14 | End: 2021-10-14 | Stop reason: SDUPTHER

## 2021-09-14 NOTE — TELEPHONE ENCOUNTER
Patient called and left vm requesting refill of gabapentin 300 mg BID     Order pended below, please sign if agreeable

## 2021-09-15 DIAGNOSIS — E53.8 B12 DEFICIENCY: Primary | ICD-10-CM

## 2021-09-15 NOTE — TELEPHONE ENCOUNTER
Patient called and left  requesting refill of gabapentin again   Also requesting an increase dose of melatonin to 15 mg     Called patient to make her aware that gabapentin was filled yesterday, unable to reach patient left message for return call to office     If agreeable, please send in increased dose of Melatonin to [facility] in [place]

## 2021-09-17 NOTE — TELEPHONE ENCOUNTER
It is not used above 10 mg for migraine prevention or insomnia  If her insurance covers it, she can add additional 5mg to the 10 (does not come in 15mg) and I would like to make sure Dr Christopher Khalil okay with this plan as well

## 2021-09-21 RX ORDER — CYANOCOBALAMIN 1000 UG/ML
INJECTION INTRAMUSCULAR; SUBCUTANEOUS
Qty: 1 ML | Refills: 6 | Status: SHIPPED | OUTPATIENT
Start: 2021-09-21 | End: 2022-01-27 | Stop reason: SDUPTHER

## 2021-09-21 NOTE — TELEPHONE ENCOUNTER
I typically do not prescribe more than melatonin 12 mg as higher doses are associated with adverse effects and no further benefit to insomnia

## 2021-09-21 NOTE — TELEPHONE ENCOUNTER
Reached vm  Left message to call back regarding dose change of medication (no communication consent)

## 2021-09-22 NOTE — TELEPHONE ENCOUNTER
Second attempt to reach patient, left message for return call to office     Letter mailed to address on file

## 2021-09-30 ENCOUNTER — PROCEDURE VISIT (OUTPATIENT)
Dept: NEUROLOGY | Facility: CLINIC | Age: 71
End: 2021-09-30
Payer: MEDICARE

## 2021-09-30 VITALS
WEIGHT: 172 LBS | SYSTOLIC BLOOD PRESSURE: 132 MMHG | DIASTOLIC BLOOD PRESSURE: 78 MMHG | BODY MASS INDEX: 28.66 KG/M2 | HEIGHT: 65 IN | TEMPERATURE: 97.5 F | HEART RATE: 65 BPM

## 2021-09-30 DIAGNOSIS — E53.8 B12 DEFICIENCY: ICD-10-CM

## 2021-09-30 DIAGNOSIS — G43.719 INTRACTABLE CHRONIC MIGRAINE WITHOUT AURA AND WITHOUT STATUS MIGRAINOSUS: Primary | ICD-10-CM

## 2021-09-30 PROCEDURE — 64615 CHEMODENERV MUSC MIGRAINE: CPT | Performed by: PSYCHIATRY & NEUROLOGY

## 2021-09-30 RX ORDER — KETOROLAC TROMETHAMINE 30 MG/ML
INJECTION, SOLUTION INTRAMUSCULAR; INTRAVENOUS
Qty: 4 ML | Refills: 1 | Status: SHIPPED | OUTPATIENT
Start: 2021-09-30 | End: 2022-02-15 | Stop reason: SDUPTHER

## 2021-09-30 RX ORDER — GALCANEZUMAB 120 MG/ML
1 INJECTION, SOLUTION SUBCUTANEOUS
Qty: 1 ML | Refills: 11 | Status: SHIPPED | OUTPATIENT
Start: 2021-09-30

## 2021-09-30 NOTE — PROGRESS NOTES
Chemodenervation     Date/Time 9/30/2021 12:35 PM     Performed by  Radu Yusuf DO     Authorized by Radu Yusuf DO        Pre-procedure details      Prepped With: Alcohol     Anesthesia  (see MAR for exact dosages): Anesthesia method:  None   Procedure details     Position:  Supine and upright   Botox     Botox Type:  Type A    Brand:  Botox    mL's of Botulinum Toxin:  200    mL's of preservative free sterile saline:  2    Final Concentration per CC:  100 units    Needle Gauge:  30 G 2 5 inch   Procedures     Botox Procedures: chronic headache      Indications: migraines     Injection Location      Head / Face:  L superior cervical paraspinal, R superior cervical paraspinal, L , R , R frontalis, L frontalis, L lateral occipitalis, R lateral occipitalis, procerus, R temporalis, L temporalis, L superior trapezius and R superior trapezius    L  injection amount:  5 unit(s)    R  injection amount:  0 unit(s)    Comments:  Avoided this due to ptosis with this injection prior    L lateral frontalis:  5 unit(s)    R lateral frontalis:  5 unit(s)    L medial frontalis:  5 unit(s)    R medial frontalis:  5 unit(s)    L temporalis injection amount:  40 unit(s)    R temporalis injection amount:  35 unit(s)    Procerus injection amount:  5 unit(s)    L lateral occipitalis injection amount:  15 unit(s)    R lateral occipitalis injection amount:  15 unit(s)    L superior cervical paraspinal injection amount:  10 unit(s)    R superior cervical paraspinal injection amount:  10 unit(s)    L superior trapezius injection amount:  15 unit(s)    R superior trapezius injection amount:  30 unit(s)    Comments:   With 10 units included in mid right trap   Total Units     Total units used:  200    Total units discarded:  0   Post-procedure details      Chemodenervation:  Chronic migraine    Facial Nerve Location[de-identified]  Bilateral facial nerve    Patient tolerance of procedure: Tolerated well, no immediate complications   Comments      Continues to do well with Botox for chronic migraine  Reports significantly lesser headaches overall since this is that started  She had a mild right-sided ptosis this avoided right  injection today  She has increased pain in hypertonicity in the right trapezius muscle questions cervical radiculopathy versus shoulder issue, EMG pending  Increased dosing  Medically necessary in the right trapezius muscle to see if this further headaches       Obtaining B12 level to see if she needs to continue injections or can have PO dosing  Also sent message to Elise Angel after discussion to see if patient EMG can be scheduled based on availability and admin to help with insurance authorization as needed  Will schedule follow up per patient preference next available

## 2021-10-03 PROBLEM — F17.210 CIGARETTE SMOKER: Status: ACTIVE | Noted: 2021-10-03

## 2021-10-03 PROBLEM — M48.02 FORAMINAL STENOSIS OF CERVICAL REGION: Status: ACTIVE | Noted: 2021-10-03

## 2021-10-03 PROBLEM — I10 HYPERTENSION: Status: ACTIVE | Noted: 2021-10-03

## 2021-10-03 PROBLEM — M50.30 DEGENERATIVE DISC DISEASE, CERVICAL: Status: ACTIVE | Noted: 2021-10-03

## 2021-10-04 ENCOUNTER — TELEPHONE (OUTPATIENT)
Dept: NEUROLOGY | Facility: CLINIC | Age: 71
End: 2021-10-04

## 2021-10-08 ENCOUNTER — TELEPHONE (OUTPATIENT)
Dept: NEUROLOGY | Facility: CLINIC | Age: 71
End: 2021-10-08

## 2021-10-11 ENCOUNTER — TELEPHONE (OUTPATIENT)
Dept: NEUROLOGY | Facility: CLINIC | Age: 71
End: 2021-10-11

## 2021-10-14 DIAGNOSIS — F51.01 PRIMARY INSOMNIA: ICD-10-CM

## 2021-10-14 DIAGNOSIS — G43.719 INTRACTABLE CHRONIC MIGRAINE WITHOUT AURA AND WITHOUT STATUS MIGRAINOSUS: ICD-10-CM

## 2021-10-14 RX ORDER — GABAPENTIN 300 MG/1
300 CAPSULE ORAL 2 TIMES DAILY
Qty: 60 CAPSULE | Refills: 3 | OUTPATIENT
Start: 2021-10-14

## 2021-10-14 RX ORDER — SYRINGE W-NEEDLE,DISPOSAB,3 ML 25GX5/8"
SYRINGE, EMPTY DISPOSABLE MISCELLANEOUS
Qty: 4 EACH | Refills: 1 | Status: SHIPPED | OUTPATIENT
Start: 2021-10-14 | End: 2022-02-15 | Stop reason: SDUPTHER

## 2021-10-14 RX ORDER — GABAPENTIN 300 MG/1
300 CAPSULE ORAL 2 TIMES DAILY
Qty: 60 CAPSULE | Refills: 3 | Status: SHIPPED | OUTPATIENT
Start: 2021-10-14 | End: 2022-02-10

## 2021-10-21 DIAGNOSIS — G43.719 INTRACTABLE CHRONIC MIGRAINE WITHOUT AURA AND WITHOUT STATUS MIGRAINOSUS: Primary | ICD-10-CM

## 2021-11-05 DIAGNOSIS — G43.719 INTRACTABLE CHRONIC MIGRAINE WITHOUT AURA AND WITHOUT STATUS MIGRAINOSUS: ICD-10-CM

## 2021-11-08 ENCOUNTER — TELEPHONE (OUTPATIENT)
Dept: PULMONOLOGY | Facility: HOSPITAL | Age: 71
End: 2021-11-08

## 2021-11-15 DIAGNOSIS — R11.0 NAUSEA: ICD-10-CM

## 2021-11-15 DIAGNOSIS — G43.719 INTRACTABLE CHRONIC MIGRAINE WITHOUT AURA AND WITHOUT STATUS MIGRAINOSUS: ICD-10-CM

## 2021-11-15 RX ORDER — PROCHLORPERAZINE MALEATE 10 MG
10 TABLET ORAL EVERY 6 HOURS PRN
Qty: 30 TABLET | Refills: 1 | Status: SHIPPED | OUTPATIENT
Start: 2021-11-15 | End: 2022-01-27 | Stop reason: SDUPTHER

## 2021-11-17 ENCOUNTER — TELEPHONE (OUTPATIENT)
Dept: NEUROLOGY | Facility: CLINIC | Age: 71
End: 2021-11-17

## 2021-12-03 ENCOUNTER — HOSPITAL ENCOUNTER (OUTPATIENT)
Dept: NEUROLOGY | Facility: CLINIC | Age: 71
Discharge: HOME | End: 2021-12-03
Payer: MEDICARE

## 2021-12-03 DIAGNOSIS — M54.12 CERVICAL RADICULOPATHY: ICD-10-CM

## 2021-12-03 DIAGNOSIS — G43.719 INTRACTABLE CHRONIC MIGRAINE WITHOUT AURA AND WITHOUT STATUS MIGRAINOSUS: ICD-10-CM

## 2021-12-03 PROCEDURE — 95909 NRV CNDJ TST 5-6 STUDIES: CPT | Performed by: PSYCHIATRY & NEUROLOGY

## 2021-12-03 PROCEDURE — 95886 MUSC TEST DONE W/N TEST COMP: CPT | Performed by: PSYCHIATRY & NEUROLOGY

## 2021-12-07 DIAGNOSIS — F51.01 PRIMARY INSOMNIA: ICD-10-CM

## 2021-12-07 DIAGNOSIS — G43.719 INTRACTABLE CHRONIC MIGRAINE WITHOUT AURA AND WITHOUT STATUS MIGRAINOSUS: ICD-10-CM

## 2021-12-07 RX ORDER — RIZATRIPTAN BENZOATE 10 MG/1
TABLET, ORALLY DISINTEGRATING ORAL
Qty: 12 TABLET | Refills: 2 | Status: SHIPPED | OUTPATIENT
Start: 2021-12-07 | End: 2022-01-27 | Stop reason: SDUPTHER

## 2021-12-07 RX ORDER — SAW/PYGEUM/BETA/HERB/D3/B6/ZN 30 MG-25MG
CAPSULE ORAL
Qty: 30 TABLET | Refills: 5 | Status: SHIPPED | OUTPATIENT
Start: 2021-12-07

## 2021-12-08 ENCOUNTER — TELEPHONE (OUTPATIENT)
Dept: NEUROLOGY | Facility: CLINIC | Age: 71
End: 2021-12-08

## 2021-12-23 ENCOUNTER — TELEPHONE (OUTPATIENT)
Dept: NEUROLOGY | Facility: CLINIC | Age: 71
End: 2021-12-23

## 2022-01-03 DIAGNOSIS — M54.12 CERVICAL RADICULOPATHY: Primary | ICD-10-CM

## 2022-01-03 DIAGNOSIS — M25.511 CHRONIC RIGHT SHOULDER PAIN: ICD-10-CM

## 2022-01-03 DIAGNOSIS — G89.29 CHRONIC RIGHT SHOULDER PAIN: ICD-10-CM

## 2022-01-05 ENCOUNTER — TELEPHONE (OUTPATIENT)
Dept: NEUROLOGY | Facility: CLINIC | Age: 72
End: 2022-01-05

## 2022-01-05 NOTE — TELEPHONE ENCOUNTER
Pt called and states that she would like to go to [facility] for mri  made her aware that she can call them to schedule but will need to let our office know when and where she will be going so that we can make PA team aware  will also need to fax mri order to [facility]

## 2022-01-05 NOTE — TELEPHONE ENCOUNTER
Pt called asking for mri script to be faxed to [address] [place] [address] 321 at [phone]      Order faxed

## 2022-01-06 ENCOUNTER — TELEPHONE (OUTPATIENT)
Dept: NEUROLOGY | Facility: CLINIC | Age: 72
End: 2022-01-06

## 2022-01-06 NOTE — TELEPHONE ENCOUNTER
----- Message from Tristan Plascencia sent at 1/6/2022 10:07 AM EST -----  Regarding: FW: schedule mri  Can you please help this patient with scheduling her MRI  ----- Message -----  From: Guillermo Hunter DO  Sent: 1/3/2022   4:43 PM EST  To: Penelope Anthony MA  Subject: schedule mri                                     Hey    Please help schedule this patients mri c spine- she should be expecting call   And also mail out PM and orthopedic referrals thanks

## 2022-01-11 NOTE — TELEPHONE ENCOUNTER
Pt left a message today at 0826 requesting MRI script be faxed to [facility] at [phone]  Requesting a CB at [phone] once faxed  Fax sent    Called and left a message on pt's answering machine letting her know that script was sent as requested

## 2022-01-17 ENCOUNTER — TELEPHONE (OUTPATIENT)
Dept: NEUROLOGY | Facility: CLINIC | Age: 72
End: 2022-01-17

## 2022-01-17 NOTE — TELEPHONE ENCOUNTER
pt called and states that [facility] never got script for mri c-spine that was faxed  asking for it to be refaxed to   [phone]  [phone]    Order printed and faxed    Received fax confirmation that fax was successful

## 2022-01-20 ENCOUNTER — TELEPHONE (OUTPATIENT)
Dept: NEUROLOGY | Facility: CLINIC | Age: 72
End: 2022-01-20

## 2022-01-20 NOTE — TELEPHONE ENCOUNTER
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

## 2022-01-27 ENCOUNTER — PROCEDURE VISIT (OUTPATIENT)
Dept: NEUROLOGY | Facility: CLINIC | Age: 72
End: 2022-01-27
Payer: MEDICARE

## 2022-01-27 VITALS — HEART RATE: 74 BPM | DIASTOLIC BLOOD PRESSURE: 74 MMHG | TEMPERATURE: 96.5 F | SYSTOLIC BLOOD PRESSURE: 122 MMHG

## 2022-01-27 DIAGNOSIS — R11.0 NAUSEA: ICD-10-CM

## 2022-01-27 DIAGNOSIS — M54.12 CERVICAL RADICULOPATHY: ICD-10-CM

## 2022-01-27 DIAGNOSIS — E53.8 B12 DEFICIENCY: ICD-10-CM

## 2022-01-27 DIAGNOSIS — G43.719 INTRACTABLE CHRONIC MIGRAINE WITHOUT AURA AND WITHOUT STATUS MIGRAINOSUS: Primary | ICD-10-CM

## 2022-01-27 PROCEDURE — 64615 CHEMODENERV MUSC MIGRAINE: CPT | Performed by: PSYCHIATRY & NEUROLOGY

## 2022-01-27 RX ORDER — SYRINGE W-NEEDLE,DISPOSAB,3 ML 25GX5/8"
SYRINGE, EMPTY DISPOSABLE MISCELLANEOUS
Qty: 10 EACH | Refills: 0 | Status: SHIPPED | OUTPATIENT
Start: 2022-01-27

## 2022-01-27 RX ORDER — DICYCLOMINE HYDROCHLORIDE 10 MG/1
10 CAPSULE ORAL 3 TIMES DAILY PRN
Qty: 90 CAPSULE | Refills: 0 | Status: SHIPPED | OUTPATIENT
Start: 2022-01-27

## 2022-01-27 RX ORDER — PROCHLORPERAZINE MALEATE 10 MG
10 TABLET ORAL EVERY 6 HOURS PRN
Qty: 30 TABLET | Refills: 1 | Status: SHIPPED | OUTPATIENT
Start: 2022-01-27

## 2022-01-27 RX ORDER — CYANOCOBALAMIN 1000 UG/ML
1000 INJECTION INTRAMUSCULAR; SUBCUTANEOUS
Qty: 10 ML | Refills: 1 | Status: SHIPPED | OUTPATIENT
Start: 2022-01-27 | End: 2022-04-30

## 2022-01-27 RX ORDER — RIZATRIPTAN BENZOATE 10 MG/1
TABLET, ORALLY DISINTEGRATING ORAL
Qty: 12 TABLET | Refills: 3 | Status: SHIPPED | OUTPATIENT
Start: 2022-01-27 | End: 2022-07-06

## 2022-01-27 RX ORDER — CYCLOBENZAPRINE HCL 10 MG
TABLET ORAL
Qty: 30 TABLET | Refills: 0 | Status: SHIPPED | OUTPATIENT
Start: 2022-01-27 | End: 2022-02-21

## 2022-01-27 NOTE — PROGRESS NOTES
Chemodenervation     Date/Time 1/27/2022 1:56 PM     Performed by  Huy Manuel DO     Authorized by Huy Manuel DO        Pre-procedure details      Prepped With: Alcohol     Anesthesia  (see MAR for exact dosages): Anesthesia method:  None   Procedure details     Position:  Supine and upright   Botox     Botox Type:  Type A    Brand:  Botox    mL's of Botulinum Toxin:  200    mL's of preservative free sterile saline:  2    Final Concentration per CC:  100 units    Needle Gauge:  30 G 2 5 inch   Procedures     Botox Procedures: chronic headache      Indications: migraines     Injection Location      Head / Face:  L superior cervical paraspinal, R superior cervical paraspinal, L , R , R frontalis, L frontalis, L lateral occipitalis, R lateral occipitalis, procerus, R temporalis, L temporalis, L superior trapezius and R superior trapezius    L  injection amount:  5 unit(s)    R  injection amount:  5 unit(s)    L lateral frontalis:  5 unit(s)    R lateral frontalis:  5 unit(s)    L medial frontalis:  5 unit(s)    R medial frontalis:  5 unit(s)    L temporalis injection amount:  40 unit(s)    R temporalis injection amount:  35 unit(s)    Procerus injection amount:  5 unit(s)    L lateral occipitalis injection amount:  15 unit(s)    R lateral occipitalis injection amount:  15 unit(s)    L superior cervical paraspinal injection amount:  10 unit(s)    R superior cervical paraspinal injection amount:  10 unit(s)    L superior trapezius injection amount:  15 unit(s)    R superior trapezius injection amount:  25 unit(s)    Comments:  Including 10 units in mid trap   Total Units     Total units used:  200    Total units discarded:  0   Post-procedure details      Chemodenervation:  Chronic migraine    Facial Nerve Location[de-identified]  Bilateral facial nerve    Patient tolerance of procedure:   Tolerated well, no immediate complications   Comments      Pt continues to do well with Botox for chronic migraine  No a/e  Increased dosing medically necessary due to severe right trapezius mm pain that is contributory to migraines  PM referral placed for right cervical radicular pain, consider STACI injection  No weakness noted  Refilled medications  She is holding off emgality- did not take this four to five months and notes no difference, will continue botox     Flexeril BID PRN added for neck/trapezius mm pain/spasm  Discussed sedating s/e  Discussed taking it several hours apart from her oxycodone prn    This is short term until she sees PM

## 2022-01-31 DIAGNOSIS — E53.8 B12 DEFICIENCY: Primary | ICD-10-CM

## 2022-01-31 NOTE — TELEPHONE ENCOUNTER
[facility] called in stating that they are out of the patient's regular syringe, the pharmacy states that they do have the 25 gauge by 1 3ml syringe available     Please advise the pharmacy

## 2022-02-10 DIAGNOSIS — G43.719 INTRACTABLE CHRONIC MIGRAINE WITHOUT AURA AND WITHOUT STATUS MIGRAINOSUS: ICD-10-CM

## 2022-02-10 DIAGNOSIS — F51.01 PRIMARY INSOMNIA: ICD-10-CM

## 2022-02-10 RX ORDER — GABAPENTIN 300 MG/1
CAPSULE ORAL
Qty: 60 CAPSULE | Refills: 3 | Status: SHIPPED | OUTPATIENT
Start: 2022-02-10 | End: 2022-03-07 | Stop reason: SDUPTHER

## 2022-02-15 DIAGNOSIS — G43.719 INTRACTABLE CHRONIC MIGRAINE WITHOUT AURA AND WITHOUT STATUS MIGRAINOSUS: ICD-10-CM

## 2022-02-15 RX ORDER — SYRINGE W-NEEDLE,DISPOSAB,3 ML 25GX5/8"
SYRINGE, EMPTY DISPOSABLE MISCELLANEOUS
Qty: 4 EACH | Refills: 1 | Status: SHIPPED | OUTPATIENT
Start: 2022-02-15

## 2022-02-15 RX ORDER — KETOROLAC TROMETHAMINE 30 MG/ML
INJECTION, SOLUTION INTRAMUSCULAR; INTRAVENOUS
Qty: 4 ML | Refills: 1 | Status: SHIPPED | OUTPATIENT
Start: 2022-02-15 | End: 2022-03-17 | Stop reason: SDUPTHER

## 2022-02-21 DIAGNOSIS — M54.12 CERVICAL RADICULOPATHY: ICD-10-CM

## 2022-02-21 RX ORDER — CYCLOBENZAPRINE HCL 10 MG
TABLET ORAL
Qty: 30 TABLET | Refills: 0 | Status: SHIPPED | OUTPATIENT
Start: 2022-02-21 | End: 2022-04-13 | Stop reason: SDUPTHER

## 2022-02-22 ENCOUNTER — TELEPHONE (OUTPATIENT)
Dept: NEUROLOGY | Facility: CLINIC | Age: 72
End: 2022-02-22

## 2022-02-22 NOTE — TELEPHONE ENCOUNTER
Pt called and states that [facility] pharmacy was unable to fill toradol inj  Called [facility] pharmacy, spoke w/Kat and advised to process toradol 30 mg/ml 4 ml per 30 days  Unable to get a paid claim  PA is required  rxbin [id number]  n meddadv  Group [id number]  Id [id number]  [phone]    Tried and failed  Compazine, rizatriptan, sumatriptan, fioricet,  zolmitriptan, phenergan, ubrelvy decadron,     PA initiated on M   Key: [id number]    Awaiting determination        [phone] ok to leave a detailed message

## 2022-02-24 NOTE — TELEPHONE ENCOUNTER
Approved per CMM  approved from 1/1/22-12/31/22    Left message for pharm making them aware of approval    Left detailed message for pt making her aware of approval and that I left a message for pharm making them aware of approval

## 2022-03-07 DIAGNOSIS — G43.719 INTRACTABLE CHRONIC MIGRAINE WITHOUT AURA AND WITHOUT STATUS MIGRAINOSUS: ICD-10-CM

## 2022-03-07 DIAGNOSIS — F51.01 PRIMARY INSOMNIA: ICD-10-CM

## 2022-03-07 RX ORDER — GABAPENTIN 300 MG/1
CAPSULE ORAL
Qty: 180 CAPSULE | Refills: 0 | Status: SHIPPED | OUTPATIENT
Start: 2022-03-07 | End: 2022-04-13

## 2022-03-07 NOTE — TELEPHONE ENCOUNTER
Pt left a message today at 10:21 requesting gabapentin refill, 90 day supply be sent to [facility]  She takes gabapentin 300 mg 2 caps daily  She has 1 day supply left  [phone]  Rx entered   Pls review and sign off    thanks

## 2022-03-14 DIAGNOSIS — G43.719 INTRACTABLE CHRONIC MIGRAINE WITHOUT AURA AND WITHOUT STATUS MIGRAINOSUS: Primary | ICD-10-CM

## 2022-03-14 RX ORDER — BUTALBITAL AND ACETAMINOPHEN 25; 325 MG/1; MG/1
TABLET ORAL
Qty: 20 TABLET | Refills: 6 | Status: CANCELLED | OUTPATIENT
Start: 2022-03-14

## 2022-03-14 RX ORDER — BUTALBITAL/ACETAMINOPHEN 50MG-325MG
TABLET ORAL
Qty: 20 TABLET | Refills: 0 | Status: SHIPPED | OUTPATIENT
Start: 2022-03-14

## 2022-03-14 NOTE — TELEPHONE ENCOUNTER
Pt called and states that she used to take acetaminophen-butalbital before or her migraines  She is getting more migraines this more d/t stress  She takes rizatriptan prn as ordered  She is out of this med  Requesting script for acetaminophen-butalbital, 20 tabs be sent to [address] [place] [address] on file  Rx entered   Pls review and sign off      thanks

## 2022-03-16 ENCOUNTER — TELEPHONE (OUTPATIENT)
Dept: NEUROLOGY | Facility: CLINIC | Age: 72
End: 2022-03-16

## 2022-03-16 NOTE — TELEPHONE ENCOUNTER
I have placed on hold appointment with Dr hCristian Figueroa 3/17 at 3:30 in CV - please schedule patient accordingly

## 2022-03-16 NOTE — TELEPHONE ENCOUNTER
Patient has been dealing with neck pain for a while now, injections are just not helping any more and she has resigned herself to the fact that she needs surgery to help correct her issues - She has complete frances in Dr Tony Mir and would like an appointment to help her decide who is going to be the best fit for her, she is asking her opinion on if this is the best route for her to go  There really is no appointment available unless I were to schedule in a botox opening   Please advise the soonest available for the patient to come in and discuss with dr Tony Mir - best number to call [phone]

## 2022-03-21 ENCOUNTER — TELEPHONE (OUTPATIENT)
Dept: NEUROLOGY | Facility: CLINIC | Age: 72
End: 2022-03-21

## 2022-03-21 NOTE — TELEPHONE ENCOUNTER
March 19, 2022    Kimmy Caicedo  to Asif Zaman, Mahin Geronimo 11, DO          12:28 PM  Dr Angelique Reynolds act identity wrote my script for 12 pills thats correct but put the refill for 84 days please rewrite the script for 12 pills 30 day refill  With several refills Please To [address] [place] [address]  Thanks NaN please send quickly  Thanks for your help     Thanks Cintia

## 2022-03-21 NOTE — TELEPHONE ENCOUNTER
Last script was sent for 12 tabs with 3 refills  Called pharm, last filled on 2/14 for 12 tabs  She states that they can fill this for her  They will get it ready for her    They are running 12 per 30 days     [employer] message sent to pt

## 2022-03-23 ENCOUNTER — TELEPHONE (OUTPATIENT)
Dept: NEUROSURGERY | Facility: CLINIC | Age: 72
End: 2022-03-23

## 2022-03-23 NOTE — TELEPHONE ENCOUNTER
Patient called the office today stating she is self-referred for cervical and would really like to see Dr Beverley Lewis  I called patient back and LM for call back to go over NP questions for Cspine

## 2022-04-09 DIAGNOSIS — R11.0 NAUSEA: ICD-10-CM

## 2022-04-11 RX ORDER — DICYCLOMINE HYDROCHLORIDE 10 MG/1
CAPSULE ORAL
Qty: 90 CAPSULE | Refills: 0 | OUTPATIENT
Start: 2022-04-11

## 2022-04-11 NOTE — TELEPHONE ENCOUNTER
Please let the patient know that dicyclomine refill should be from PCP, I do not prescribe this medication as it is not a neurology related script      I believe I did a one time script as patient could not see PCP for some time thanks

## 2022-04-11 NOTE — TELEPHONE ENCOUNTER
Attempted to reach patient with phone number on file  "subscribed you have reached is not in service"  No other number on file for the patient  No consent form on file to reach patient's sister  Letter sent to home address on file

## 2022-04-12 DIAGNOSIS — G43.719 INTRACTABLE CHRONIC MIGRAINE WITHOUT AURA AND WITHOUT STATUS MIGRAINOSUS: ICD-10-CM

## 2022-04-12 DIAGNOSIS — F51.01 PRIMARY INSOMNIA: ICD-10-CM

## 2022-04-13 DIAGNOSIS — M54.12 CERVICAL RADICULOPATHY: ICD-10-CM

## 2022-04-13 RX ORDER — GABAPENTIN 300 MG/1
CAPSULE ORAL
Qty: 180 CAPSULE | Refills: 0 | Status: SHIPPED | OUTPATIENT
Start: 2022-04-13

## 2022-04-13 NOTE — TELEPHONE ENCOUNTER
Pt left a message today at 12:29 requesting flexeril refill be sent to [facility] in [place]  Stated that she initially told Dr Mabel Ho it was not working but after trying it multiple times, it does help   [phone]    Rx entered   Pls review and sign off    thanks

## 2022-04-19 RX ORDER — CYCLOBENZAPRINE HCL 10 MG
TABLET ORAL
Qty: 30 TABLET | Refills: 2 | Status: SHIPPED | OUTPATIENT
Start: 2022-04-19

## 2022-04-30 DIAGNOSIS — E53.8 B12 DEFICIENCY: ICD-10-CM

## 2022-04-30 RX ORDER — CYANOCOBALAMIN 1000 UG/ML
INJECTION INTRAMUSCULAR; SUBCUTANEOUS
Qty: 3 ML | Refills: 6 | Status: SHIPPED | OUTPATIENT
Start: 2022-04-30

## 2022-05-02 ENCOUNTER — TELEPHONE (OUTPATIENT)
Dept: NEUROLOGY | Facility: CLINIC | Age: 72
End: 2022-05-02

## 2022-05-02 NOTE — TELEPHONE ENCOUNTER
Called for patient to call us back to her Botox appointment reschedule due to Dr Juliana Mercado being out of the office  Provided our call back information

## 2022-05-10 DIAGNOSIS — G43.719 INTRACTABLE CHRONIC MIGRAINE WITHOUT AURA AND WITHOUT STATUS MIGRAINOSUS: ICD-10-CM

## 2022-05-18 ENCOUNTER — TELEPHONE (OUTPATIENT)
Dept: NEUROLOGY | Facility: CLINIC | Age: 72
End: 2022-05-18

## 2022-05-18 NOTE — TELEPHONE ENCOUNTER
Cyclobenzaprine PA completed on CM  Key: [id number]  If [employer] has not responded in 1-3 days or if you have any questions about your ePA request, please contact [place] [employer] Part [employer] at [phone]

## 2022-05-23 ENCOUNTER — TELEPHONE (OUTPATIENT)
Dept: NEUROSURGERY | Facility: CLINIC | Age: 72
End: 2022-05-23

## 2022-05-24 ENCOUNTER — PROCEDURE VISIT (OUTPATIENT)
Dept: NEUROLOGY | Facility: CLINIC | Age: 72
End: 2022-05-24
Payer: MEDICARE

## 2022-05-24 VITALS — SYSTOLIC BLOOD PRESSURE: 130 MMHG | TEMPERATURE: 98.5 F | DIASTOLIC BLOOD PRESSURE: 72 MMHG | HEART RATE: 78 BPM

## 2022-05-24 DIAGNOSIS — G43.701 CHRONIC MIGRAINE WITHOUT AURA WITH STATUS MIGRAINOSUS, NOT INTRACTABLE: Primary | ICD-10-CM

## 2022-05-24 PROCEDURE — 64615 CHEMODENERV MUSC MIGRAINE: CPT | Performed by: PHYSICIAN ASSISTANT

## 2022-05-24 NOTE — PROGRESS NOTES
Universal Protocol   Consent: Verbal consent obtained  Written consent obtained    Risks and benefits: risks, benefits and alternatives were discussed  Consent given by: patient  Patient understanding: patient states understanding of the procedure being performed  Patient consent: the patient's understanding of the procedure matches consent given  Procedure consent: procedure consent matches procedure scheduled        Chemodenervation     Date/Time 5/24/2022 8:38 AM     Performed by  Chidi Victoria PA-C     Authorized by Chidi Victoria PA-C        Pre-procedure details      Prepped With: Alcohol     Procedure details     Position:  Upright   Botox     Botox Type:  Type A    Brand:  Botox    mL's of Botulinum Toxin:  200    Final Concentration per CC:  100 units    Needle Gauge:  30 G 2 5 inch   Procedures     Botox Procedures: chronic headache      Indications: migraines     Injection Location      Head / Face:  L superior trapezius, R superior trapezius, L superior cervical paraspinal, R superior cervical paraspinal, L , R , procerus, L temporalis, R temporalis, R frontalis, L frontalis, R medial occipitalis and L medial occipitalis    L  injection amount:  5 unit(s)    R  injection amount:  5 unit(s)    L lateral frontalis:  5 unit(s)    R lateral frontalis:  5 unit(s)    L medial frontalis:  5 unit(s)    R medial frontalis:  5 unit(s)    L temporalis injection amount:  20 unit(s)    R temporalis injection amount:  40 unit(s)    Procerus injection amount:  5 unit(s)    L medial occipitalis injection amount:  15 unit(s)    R medial occipitalis injection amount:  20 unit(s)    L superior cervical paraspinal injection amount:  10 unit(s)    R superior cervical paraspinal injection amount:  20 unit(s)    L superior trapezius injection amount:  15 unit(s)    R superior trapezius injection amount:  25 unit(s)   Total Units     Total units used:  200    Total units discarded:  0 Post-procedure details      Chemodenervation:  Chronic migraine    Facial Nerve Location[de-identified]  Bilateral facial nerve    Patient tolerance of procedure: Tolerated well, no immediate complications   Comments      All units above medically necessary  Consider next visit- injections at bridge of nose, inferior orbicularis b/l  In the past, pt reported ptosis with  injections however has significant R>L eye pain with migraine  TKR scheduled 6/6  Planning to see Nsx re: neck pain/ R radiculopathy  Blood pressure 130/72, pulse 78, temperature 98 5 °F (36 9 °C)

## 2022-07-02 DIAGNOSIS — G43.719 INTRACTABLE CHRONIC MIGRAINE WITHOUT AURA AND WITHOUT STATUS MIGRAINOSUS: ICD-10-CM

## 2022-07-06 RX ORDER — RIZATRIPTAN BENZOATE 10 MG/1
TABLET, ORALLY DISINTEGRATING ORAL
Qty: 12 TABLET | Refills: 3 | Status: SHIPPED | OUTPATIENT
Start: 2022-07-06 | End: 2022-10-08 | Stop reason: SDUPTHER

## 2022-07-26 ENCOUNTER — TELEPHONE (OUTPATIENT)
Dept: NEUROLOGY | Facility: CLINIC | Age: 72
End: 2022-07-26

## 2022-07-26 NOTE — TELEPHONE ENCOUNTER
Left two messages to cancel her appt with Jim Moran, and keep her appt with [facility] for Sept  [facility] can tell her which provider to schedule with at that appt

## 2022-08-01 NOTE — TELEPHONE ENCOUNTER
Patient called to see if she can be scheduled for her yearly follow up in Aug and I advised her I will look into it and talk to some providers and give her a call back once I get an answer

## 2022-08-02 NOTE — TELEPHONE ENCOUNTER
Spoke with Martha Coffey and he stated it is okay to schedule with him  I called patient and lmom for her to call back to schedule with Vahid on 8-4-22 at 1 pm  Left call back number [phone]  Sending a [facility] message as well  If patient call back I have that slot on hold just for her

## 2022-08-03 NOTE — TELEPHONE ENCOUNTER
Patient sent a [facility] message accepting appointment with Pavan Berger on 8-4-22 at 1 pm  I schedule the appointment and sent her the address and appointment info via [facility]

## 2022-08-04 ENCOUNTER — TELEPHONE (OUTPATIENT)
Dept: NEUROLOGY | Facility: CLINIC | Age: 72
End: 2022-08-04

## 2022-08-04 NOTE — TELEPHONE ENCOUNTER
Patient left vm she had to cx appointment today as she is not feeling well   I called jazzy to advise

## 2022-08-22 ENCOUNTER — PATIENT MESSAGE (OUTPATIENT)
Dept: NEUROLOGY | Facility: CLINIC | Age: 72
End: 2022-08-22

## 2022-08-30 ENCOUNTER — TELEPHONE (OUTPATIENT)
Dept: NEUROLOGY | Facility: CLINIC | Age: 72
End: 2022-08-30

## 2022-08-30 DIAGNOSIS — G43.719 INTRACTABLE CHRONIC MIGRAINE WITHOUT AURA AND WITHOUT STATUS MIGRAINOSUS: Primary | ICD-10-CM

## 2022-08-30 NOTE — TELEPHONE ENCOUNTER
Please see communication thread  Message from 8/30    10day migraine   Hello can someone respond to me ?? IM Waiting for my script for prednisone can you please help me or  respond to me  Thanks     ----- Message from Fabian Wang sent at 8/29/2022 11:20 AM EDT -----  Regarding: FW: 10 day migraine     ----- Message -----  From: Timothy Bauman  Sent: 8/29/2022  11:03 AM EDT  To: [facility] [place] [facility]  Subject: 10 day migraine                                  10 day migraine  my family doctor gave me prednisone four days I took it for two days towards the end of the second day it started working only now I lost the script and he wants me to get a new script from you  I think I need a seven day taper prednisone and if you call it into [facility] and Ric[facility]ston really appreciate it as soon as possible thank you bye-bye    10 days is just to long  If you can give me voicemail when you send it in Id really appreciate it   Thanks NaN

## 2022-08-30 NOTE — TELEPHONE ENCOUNTER
Spoke to [place], nursing team 2 who advised patient currently in the waiting room of the [facility] neurology office and said she will not leave until she speaks to someone regarding her headaches and received a prescription  Patient of Wendy Rodriguez, next botox scheduled 9/7    I attempted to call patient on both home and mobile line in chart and both went directly to voicemail ;left message to call back to discuss symptoms  I called the [facility] office and was told patient left office; said they told patient nursing would be calling her;  staff said also reminded patient before she left can go to  urgent care/ED for pain relief if needed in the meantime

## 2022-09-02 NOTE — TELEPHONE ENCOUNTER
Romayne Glass, PA-C  to Yousif Chino, FLORY          8/30/22 10:26 AM  We need more info re what dose of prednisone the pt is taking  Thanks

## 2022-09-06 RX ORDER — PREDNISONE 10 MG/1
TABLET ORAL
Qty: 32 TABLET | Refills: 0 | Status: SHIPPED | OUTPATIENT
Start: 2022-09-06 | End: 2022-10-07 | Stop reason: SDUPTHER

## 2022-09-07 ENCOUNTER — PROCEDURE VISIT (OUTPATIENT)
Dept: NEUROLOGY | Facility: CLINIC | Age: 72
End: 2022-09-07
Payer: MEDICARE

## 2022-09-07 VITALS — HEART RATE: 73 BPM | TEMPERATURE: 96.6 F | SYSTOLIC BLOOD PRESSURE: 145 MMHG | DIASTOLIC BLOOD PRESSURE: 65 MMHG

## 2022-09-07 DIAGNOSIS — G43.719 INTRACTABLE CHRONIC MIGRAINE WITHOUT AURA AND WITHOUT STATUS MIGRAINOSUS: ICD-10-CM

## 2022-09-07 DIAGNOSIS — G43.701 CHRONIC MIGRAINE WITHOUT AURA WITH STATUS MIGRAINOSUS, NOT INTRACTABLE: Primary | ICD-10-CM

## 2022-09-07 PROCEDURE — 64615 CHEMODENERV MUSC MIGRAINE: CPT | Performed by: PHYSICIAN ASSISTANT

## 2022-09-07 RX ORDER — KETOROLAC TROMETHAMINE 30 MG/ML
INJECTION, SOLUTION INTRAMUSCULAR; INTRAVENOUS
Qty: 4 ML | Refills: 1 | Status: SHIPPED | OUTPATIENT
Start: 2022-09-07

## 2022-09-07 RX ORDER — SYRINGE W-NEEDLE,DISPOSAB,3 ML 25GX5/8"
SYRINGE, EMPTY DISPOSABLE MISCELLANEOUS
Qty: 4 EACH | Refills: 1 | Status: SHIPPED | OUTPATIENT
Start: 2022-09-07

## 2022-09-07 NOTE — PROGRESS NOTES
Universal Protocol   Consent: Verbal consent obtained  Written consent obtained    Risks and benefits: risks, benefits and alternatives were discussed  Consent given by: patient  Patient understanding: patient states understanding of the procedure being performed  Patient consent: the patient's understanding of the procedure matches consent given  Procedure consent: procedure consent matches procedure scheduled        Chemodenervation     Date/Time 9/7/2022 2:25 PM     Performed by  Corie Childs PA-C     Authorized by Corie Childs PA-C        Pre-procedure details      Prepped With: Alcohol     Procedure details     Position:  Upright   Botox     Botox Type:  Type A    Brand:  Botox    mL's of Botulinum Toxin:  200    Final Concentration per CC:  100 units    Needle Gauge:  30 G 2 5 inch   Procedures     Botox Procedures: chronic headache      Indications: migraines     Injection Location      Head / Face:  L superior trapezius, R superior trapezius, L superior cervical paraspinal, R superior cervical paraspinal, L , R , procerus, L temporalis, R temporalis, R frontalis, L frontalis, R medial occipitalis and L medial occipitalis    L  injection amount:  5 unit(s)    R  injection amount:  5 unit(s)    L lateral frontalis:  5 unit(s)    R lateral frontalis:  5 unit(s)    L medial frontalis:  5 unit(s)    R medial frontalis:  5 unit(s)    L temporalis injection amount:  20 unit(s)    R temporalis injection amount:  20 unit(s)    Procerus injection amount:  5 unit(s)    L medial occipitalis injection amount:  15 unit(s)    R medial occipitalis injection amount:  15 unit(s)    L superior cervical paraspinal injection amount:  10 unit(s)    R superior cervical paraspinal injection amount:  10 unit(s)    L superior trapezius injection amount:  15 unit(s)    R superior trapezius injection amount:  15 unit(s)   Total Units     Total units used:  200    Total units discarded:  0 Post-procedure details      Chemodenervation:  Chronic migraine    Facial Nerve Location[de-identified]  Bilateral facial nerve    Patient tolerance of procedure: Tolerated well, no immediate complications   Comments      Extra medically necessary:  - R occipitalis- 10  - R temporalis- 10  - R upper traps- 10  - R middle traps- 15       Blood pressure 145/65, pulse 73, temperature (!) 96 6 °F (35 9 °C), temperature source Temporal     Pt scheduled for C4-7 ACDF on 9/22/22  Pt received prednisone taper on 8/30 via express care ([facility]), so asked her to hold the recently orescribed prednisone taper I sent yesterday  I did not realize she already started a taper per [facility]  Instead of prednisone, pt is to try ubrelvy PRN migraine, s/e reviewed  Contact me if that fails  Can continue toradol injection judiciously

## 2022-09-12 ENCOUNTER — TELEPHONE (OUTPATIENT)
Dept: NEUROLOGY | Facility: CLINIC | Age: 72
End: 2022-09-12

## 2022-09-12 NOTE — TELEPHONE ENCOUNTER
Submitted via [id number]  [phone]    [facility] [id number]  [id number]  [id number]  rxcvsd    Determination pending

## 2022-09-12 NOTE — TELEPHONE ENCOUNTER
Recd my chart message from patient regarding [facility] Lexus    needs prior authorization from you  I’m very anxious to try this  Please send in quickly  Thanks NaN  The current price is $1400 00  Will work on same

## 2022-09-14 NOTE — TELEPHONE ENCOUNTER
Arnold Koroma approved 1/1/2022 - 12/31/2022; letter in [address] patient to advise; reached , left detailed message (consent on file)

## 2022-10-08 DIAGNOSIS — G43.719 INTRACTABLE CHRONIC MIGRAINE WITHOUT AURA AND WITHOUT STATUS MIGRAINOSUS: ICD-10-CM

## 2022-10-10 RX ORDER — RIZATRIPTAN BENZOATE 10 MG/1
TABLET, ORALLY DISINTEGRATING ORAL
Qty: 12 TABLET | Refills: 3 | Status: SHIPPED | OUTPATIENT
Start: 2022-10-10

## 2022-11-10 ENCOUNTER — TELEPHONE (OUTPATIENT)
Dept: NEUROLOGY | Facility: CLINIC | Age: 72
End: 2022-11-10

## 2022-11-10 NOTE — TELEPHONE ENCOUNTER
pt left vm asking for a refill of rizatriptan   states that for some reason it is marked as inactive on 10/10     [phone]    called pharm, states that pt called them this am and they are refilling rizatriptan       Left detailed message per communication consent regarding above

## 2022-12-13 ENCOUNTER — EVALUATION (OUTPATIENT)
Dept: PHYSICAL THERAPY | Facility: CLINIC | Age: 72
End: 2022-12-13

## 2022-12-13 DIAGNOSIS — M25.511 CHRONIC RIGHT SHOULDER PAIN: Primary | ICD-10-CM

## 2022-12-13 DIAGNOSIS — M54.2 NECK PAIN: ICD-10-CM

## 2022-12-13 DIAGNOSIS — G89.29 CHRONIC RIGHT SHOULDER PAIN: Primary | ICD-10-CM

## 2022-12-13 NOTE — LETTER
2022    [place] [address]    Patient: Renetta Bauman   YOB: 1950   Date of Visit: 2022     Encounter Diagnosis     ICD-10-CM    1  Chronic right shoulder pain  M25 511     G89 29       2  Neck pain  M54 2           Dear Dr Beena Garcia:    Thank you for your recent referral of Adama Quach  Please review the attached evaluation summary from Jovita's recent visit  Please verify that you agree with the plan of care by signing the attached order  If you have any questions or concerns, please do not hesitate to call  I sincerely appreciate the opportunity to share in the care of one of your patients and hope to have another opportunity to work with you in the near future  Sincerely,    Rufus Villanueva, PT      Referring Provider:      I certify that I have read the below Plan of Care and certify the need for these services furnished under this plan of treatment while under my care  ANIYA Connor  [address] [facility]  [place]   [phone]  Via Fax: [phone]          PT Evaluation     Today's date: 2022  Patient name: Adama Quach  : 1950  MRN: [id number]  Referring provider: Marine Jaimes PA-C  Dx:   Encounter Diagnosis     ICD-10-CM    1  Chronic right shoulder pain  M25 511     G89 29       2  Neck pain  M54 2                      Assessment/Plan  Ms Bauman is a 71 y o  female presenting to outpatient physical therapy s/p C4-7 anterior cervial discectomy and fusion, allograft, plate and screws on   Impairments affecting function: increased pain, decreased cervical ROM, decreased cervical strength and endurance, and decreased neuromuscular control of scapula  PMH is sig for migraines, cervical fusion, and lumbar radiculopathy      Functional limitations are result of the above impairments, which limit her ability to craft, sleep, and ADL's   No further referral appears necessary at this time based upon examination results    The patients's greatest concerns are the pain not getting better, fear of not being able to care for self or others and fear of pain getting worse  Patient was given the opportunity to ask questions, and all questions were answered to the patient's satisfaction  Patient appears motivated, agrees with the POC, and is a good candidate for skilled physical therapy at this time  Patient was provided with handout of HEP consisting of cervical stretching and strengthening  Symptom irritability: Low Understanding of Dx/Px/POC: good  Prognosis: good  Prognosis details: Positive prognostic indicators include: active  Negative prognostic indicators include: hx chronic pain and hx RTC repair    Goals  STG (4 weeks)  Pain: Patient will subjectively report maximum pain decreased by 2/10  ROM: Patient will demonstrate increased cervical lateral flexion ROM by 25%  Strength: Patient will demonstrate increased DNF endurance to 10 seconds or better without compensations  LTG (8 weeks)  Pain: Patient will subjectively report less than 2/10 pain with functional activities  ROM: Patient will demonstrate cervical ROM to [facility]  Strength: Patient will demonstrate increased DNF endurance to 20 seconds or better without compensations  Patient will be ind with HEP at discharge         Plan  Plan details: Prognosis above is given PT services 2x/week over the next 8 weeks, tapering to 1x/week and home program adherence     Patient would benefit from: skilled PT  Referral necessary: no  Planned modality interventions: Hydrocollator packs, Cryotherapy, TENS, Low level laser, Traction, Ultrasound and Unattended Electrical Stimulation  Planned therapy interventions: Joint mobilization, Manual therapy, Massage, Neuromuscular re-education, Patient Education, Stretching, Strengthening, Therapeutic activities, Therapeutic exercises, Home exercise program, Functional ROM exercises, flexibility, motor coordination training, Coordination and Behavior modifcation  Frequency: 2x/week for 8 weeks  Duration in visits: 16  Plan of Care beginning date: 12/13/2022  Plan of Care expiration date: 2/07/2023  Treatment plan discussed with: patient      Subjective  IE (12/06/2022): Patient is a 75 y  o  female who presents for an initial outpatient physical therapy consultation s/p C4-7 fusion in October 24, 2022  Patient has a history of migraines and long term opioid use for pain management  She also has a x of RTC repairs (bilateral) and lumbar radiculopathy  She tried to go off oxycodone and migraines got worse ultimately leading to the decision of a cervical fusion  Prior to sx, she experienced radicular pain down to her fingers as well as cervicogenic headaches/pain  Currently she experiences tightness in her neck and wakes up achy  She does wear a cervical collar to bed due to comfort  She experiences pain in right shoulder which she believes in muscle tightness causing a "fire" sensation  Due to her pain and discomfort, 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, stamping, and coloring   Ice helps her a lot as well as opioids       Pain  Best: 0/10  Worst: 9/10  Irritability: minutes  Location: R shoulder, upper trap, neck  Quality: "fire"   Aggravating factors:   Alleviating factors: OTC, ice  Progression: not improving     Diagnostic Testing  CT scan: showed loosening of screws in C7    Social Support  Employment status: aid   Hobbies/Recreational activities: stenciling, stamping  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      Patient Goals for Therapy  "decrease pain and get full mobility back"    Objective     Concurrent Complaints  Negative for night pain, disturbed sleep, dizziness, faints, headaches, nausea/motion sickness, tinnitus, trouble swallowing, difficulty breathing, shortness of breath, respiratory pain, visual change, cardiac problem, kidney problem, gallbladder problem, stomach problem, ulcer, appendix problem, spleen problem, pancreas problem, history of cancer, history of trauma and infection    Postural Observations  Seated posture: fair  Standing posture: fair        Active Range of Motion   Cervical/Thoracic Spine       Cervical    Flexion: 45 degrees   Extension: 45 degrees      Left lateral flexion: 20 degrees      Right lateral flexion: 20 degrees      Left rotation: 70 degrees  Right rotation: 70 degrees         Thoracic    Extension:  Restriction level: moderate    Strength/Myotome Testing     Additional Strength Details  DNF endurance: 5 seconds with compensations from pec and upper trapezius   Neuro Exam:     Headaches   Patient reports headaches: No              Precautions: Sx 10/24/22  Patient treated by Ciera Hernandez SPT under direct PT supervision by Liang Pacheco, PT, DPT    Manuals 12/13            Cervical PROM             Cervical sideglide             1st rib mobilization                          Neuro Re-Ed             Cervical retractions HEP            Scapular retraction             Chin tucks supine             Rows with chin tuck             Low rows with chin tuck                                       Ther Ex             UT stretch HEP            Seated thoracic stretch HEP            Open book thoracic rotations             SNAG                                                                 Ther Activity                                       Gait Training                                       Modalities                          IE/HEP KUNAL/JF

## 2022-12-13 NOTE — PROGRESS NOTES
PT Evaluation     Today's date: 2022  Patient name: Cathi Grimaldo  : 1950  MRN: [id number]  Referring provider: Juan José Randolph PA-C  Dx:   Encounter Diagnosis     ICD-10-CM    1  Chronic right shoulder pain  M25 511     G89 29       2  Neck pain  M54 2                      Assessment/Plan  Ms Bauman is a 71 y o  female presenting to outpatient physical therapy s/p C4-7 anterior cervial discectomy and fusion, allograft, plate and screws on   Impairments affecting function: increased pain, decreased cervical ROM, decreased cervical strength and endurance, and decreased neuromuscular control of scapula  PMH is sig for migraines, cervical fusion, and lumbar radiculopathy  Functional limitations are result of the above impairments, which limit her ability to craft, sleep, and ADL's  No further referral appears necessary at this time based upon examination results    The patients's greatest concerns are the pain not getting better, fear of not being able to care for self or others and fear of pain getting worse  Patient was given the opportunity to ask questions, and all questions were answered to the patient's satisfaction  Patient appears motivated, agrees with the POC, and is a good candidate for skilled physical therapy at this time  Patient was provided with handout of HEP consisting of cervical stretching and strengthening  Symptom irritability: Low Understanding of Dx/Px/POC: good  Prognosis: good  Prognosis details: Positive prognostic indicators include: active  Negative prognostic indicators include: hx chronic pain and hx RTC repair    Goals  STG (4 weeks)  Pain: Patient will subjectively report maximum pain decreased by 2/10  ROM: Patient will demonstrate increased cervical lateral flexion ROM by 25%  Strength: Patient will demonstrate increased DNF endurance to 10 seconds or better without compensations        LTG (8 weeks)  Pain: Patient will subjectively report less than 2/10 pain with functional activities  ROM: Patient will demonstrate cervical ROM to [facility]  Strength: Patient will demonstrate increased DNF endurance to 20 seconds or better without compensations  Patient will be ind with HEP at discharge         Plan  Plan details: Prognosis above is given PT services 2x/week over the next 8 weeks, tapering to 1x/week and home program adherence  Patient would benefit from: skilled PT  Referral necessary: no  Planned modality interventions: Hydrocollator packs, Cryotherapy, TENS, Low level laser, Traction, Ultrasound and Unattended Electrical Stimulation  Planned therapy interventions: Joint mobilization, Manual therapy, Massage, Neuromuscular re-education, Patient Education, Stretching, Strengthening, Therapeutic activities, Therapeutic exercises, Home exercise program, Functional ROM exercises, flexibility, motor coordination training, Coordination and Behavior modifcation  Frequency: 2x/week for 8 weeks  Duration in visits: 16  Plan of Care beginning date: 12/13/2022  Plan of Care expiration date: 2/07/2023  Treatment plan discussed with: patient      Subjective  IE (12/06/2022): Patient is a 75 y  o  female who presents for an initial outpatient physical therapy consultation s/p C4-7 fusion in October 24, 2022  Patient has a history of migraines and long term opioid use for pain management  She also has a x of RTC repairs (bilateral) and lumbar radiculopathy  She tried to go off oxycodone and migraines got worse ultimately leading to the decision of a cervical fusion  Prior to sx, she experienced radicular pain down to her fingers as well as cervicogenic headaches/pain  Currently she experiences tightness in her neck and wakes up achy  She does wear a cervical collar to bed due to comfort  She experiences pain in right shoulder which she believes in muscle tightness causing a "fire" sensation   Due to her pain and discomfort, 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, stamping, and coloring   Ice helps her a lot as well as opioids       Pain  Best: 0/10  Worst: 9/10  Irritability: minutes  Location: R shoulder, upper trap, neck  Quality: "fire"   Aggravating factors:   Alleviating factors: OTC, ice  Progression: not improving     Diagnostic Testing  CT scan: showed loosening of screws in C7    Social Support  Employment status: aid   Hobbies/Recreational activities: stenciling, stamping  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      Patient Goals for Therapy  "decrease pain and get full mobility back"    Objective     Concurrent Complaints  Negative for night pain, disturbed sleep, dizziness, faints, headaches, nausea/motion sickness, tinnitus, trouble swallowing, difficulty breathing, shortness of breath, respiratory pain, visual change, cardiac problem, kidney problem, gallbladder problem, stomach problem, ulcer, appendix problem, spleen problem, pancreas problem, history of cancer, history of trauma and infection    Postural Observations  Seated posture: fair  Standing posture: fair        Active Range of Motion   Cervical/Thoracic Spine       Cervical    Flexion: 45 degrees   Extension: 45 degrees      Left lateral flexion: 20 degrees      Right lateral flexion: 20 degrees      Left rotation: 70 degrees  Right rotation: 70 degrees         Thoracic    Extension:  Restriction level: moderate    Strength/Myotome Testing     Additional Strength Details  DNF endurance: 5 seconds with compensations from pec and upper trapezius   Neuro Exam:     Headaches   Patient reports headaches: No               Precautions: Sx 10/24/22  Patient treated by GUANACO Nunez under direct PT supervision by Chrissy Houston, PT, DPT    Manuals 12/13            Cervical PROM             Cervical sideglide             1st rib mobilization                          Neuro Re-Ed             Cervical retractions HEP            Scapular retraction             Chin tucks supine             Rows with chin tuck             Low rows with chin tuck                                       Ther Ex             UT stretch HEP            Seated thoracic stretch HEP            Open book thoracic rotations             SNAG                                                                 Ther Activity                                       Gait Training                                       Modalities                          IE/HEP KUNAL/JF

## 2022-12-22 ENCOUNTER — TELEPHONE (OUTPATIENT)
Dept: NEUROLOGY | Facility: CLINIC | Age: 72
End: 2022-12-22

## 2022-12-22 NOTE — TELEPHONE ENCOUNTER
Left voicemail for pt to call back to confirm upcoming appointment with [facility] for 12/27

## 2022-12-22 NOTE — TELEPHONE ENCOUNTER
Pt left voicemail confirming scheduled appt with [facility]  Asking for call back to confirm location of appt       [phone]

## 2022-12-26 NOTE — PROGRESS NOTES
Universal Protocol   Consent: Verbal consent obtained  Written consent obtained    Risks and benefits: risks, benefits and alternatives were discussed  Consent given by: patient  Patient understanding: patient states understanding of the procedure being performed  Patient consent: the patient's understanding of the procedure matches consent given  Procedure consent: procedure consent matches procedure scheduled        Chemodenervation     Date/Time 12/26/2022 1:56 PM     Performed by  Melinda Marquez PA-C     Authorized by Melinda Marquez PA-C        Pre-procedure details      Prepped With: Alcohol     Procedure details     Position:  Upright   Botox     Botox Type:  Type A    Brand:  Botox    mL's of Botulinum Toxin:  200    Final Concentration per CC:  100 units    Needle Gauge:  30 G 2 5 inch   Procedures     Botox Procedures: chronic headache      Indications: migraines     Injection Location      Head / Face:  L superior trapezius, R superior trapezius, L superior cervical paraspinal, R superior cervical paraspinal, L , R , procerus, L temporalis, R temporalis, R frontalis, L frontalis, R medial occipitalis and L medial occipitalis    L  injection amount:  5 unit(s)    R  injection amount:  5 unit(s)    L lateral frontalis:  5 unit(s)    R lateral frontalis:  5 unit(s)    L medial frontalis:  5 unit(s)    R medial frontalis:  5 unit(s)    L temporalis injection amount:  20 unit(s)    R temporalis injection amount:  20 unit(s)    Procerus injection amount:  5 unit(s)    L medial occipitalis injection amount:  15 unit(s)    R medial occipitalis injection amount:  15 unit(s)    L superior cervical paraspinal injection amount:  10 unit(s)    R superior cervical paraspinal injection amount:  10 unit(s)    L superior trapezius injection amount:  15 unit(s)    R superior trapezius injection amount:  15 unit(s)   Total Units     Total units used:  200    Total units discarded: 0   Post-procedure details      Chemodenervation:  Chronic migraine    Facial Nerve Location[de-identified]  Bilateral facial nerve    Patient tolerance of procedure: Tolerated well, no immediate complications   Comments      Extra medically necessary:  - R occipitalis- 10  - R temporalis- 10  - R upper traps- 10  - R middle traps- 15     Blood pressure 165/78, pulse 83, temperature (!) 96 3 °F (35 7 °C), temperature source Temporal, weight 76 2 kg (168 lb)  She is s/p C4-C7 ACDF 10/24/22 with [facility], Dr Aimee Austin  Right shoulder and arm pain significantly reduced since surgery  Pt asking for another migraine abortive  States Sheryl Krishnan is too costly  Agreeable to try reyvow, s/e reviewed in detail  She also has maxalt which works, but states pharmacy only gives her 9 tabs and she should get 12  She should contact pharmacy and/or insurance to ask why, as I prescribed #12 last time  If maxalt fails, she uses Fioricet without caffeine (1- 1 5 tabs prn)  She rarely uses it but it helps

## 2022-12-27 ENCOUNTER — PROCEDURE VISIT (OUTPATIENT)
Dept: NEUROLOGY | Facility: CLINIC | Age: 72
End: 2022-12-27

## 2022-12-27 VITALS
SYSTOLIC BLOOD PRESSURE: 165 MMHG | BODY MASS INDEX: 27.96 KG/M2 | WEIGHT: 168 LBS | HEART RATE: 83 BPM | TEMPERATURE: 96.3 F | DIASTOLIC BLOOD PRESSURE: 78 MMHG

## 2022-12-27 DIAGNOSIS — E53.8 B12 DEFICIENCY: ICD-10-CM

## 2022-12-27 DIAGNOSIS — G43.719 INTRACTABLE CHRONIC MIGRAINE WITHOUT AURA AND WITHOUT STATUS MIGRAINOSUS: Primary | ICD-10-CM

## 2022-12-27 RX ORDER — SYRINGE W-NEEDLE,DISPOSAB,3 ML 25GX5/8"
SYRINGE, EMPTY DISPOSABLE MISCELLANEOUS
Qty: 12 EACH | Refills: 2 | Status: SHIPPED | OUTPATIENT
Start: 2022-12-27

## 2022-12-27 RX ORDER — CYANOCOBALAMIN 1000 UG/ML
INJECTION, SOLUTION INTRAMUSCULAR; SUBCUTANEOUS
Qty: 3 ML | Refills: 4 | Status: SHIPPED | OUTPATIENT
Start: 2022-12-27

## 2022-12-29 ENCOUNTER — APPOINTMENT (OUTPATIENT)
Dept: PHYSICAL THERAPY | Facility: CLINIC | Age: 72
End: 2022-12-29

## 2023-01-03 ENCOUNTER — APPOINTMENT (OUTPATIENT)
Dept: PHYSICAL THERAPY | Facility: CLINIC | Age: 73
End: 2023-01-03

## 2023-01-05 ENCOUNTER — TELEPHONE (OUTPATIENT)
Dept: NEUROLOGY | Facility: CLINIC | Age: 73
End: 2023-01-05

## 2023-01-05 ENCOUNTER — APPOINTMENT (OUTPATIENT)
Dept: PHYSICAL THERAPY | Facility: CLINIC | Age: 73
End: 2023-01-05

## 2023-01-05 NOTE — TELEPHONE ENCOUNTER
Patient sent my chart message:    it’s not on there formulary  Maybe u could prescribe something else please   Thanks    Will work on [employer]

## 2023-01-11 NOTE — TELEPHONE ENCOUNTER
approved by [employer] part D, 1/1/2023 - 12/31/2023  I called patient to advise; reached vm, left detailed message (consent on file)

## 2023-01-12 ENCOUNTER — APPOINTMENT (OUTPATIENT)
Dept: PHYSICAL THERAPY | Facility: CLINIC | Age: 73
End: 2023-01-12

## 2023-01-17 ENCOUNTER — APPOINTMENT (OUTPATIENT)
Dept: PHYSICAL THERAPY | Facility: CLINIC | Age: 73
End: 2023-01-17

## 2023-01-19 ENCOUNTER — EVALUATION (OUTPATIENT)
Dept: PHYSICAL THERAPY | Facility: CLINIC | Age: 73
End: 2023-01-19

## 2023-01-19 DIAGNOSIS — G89.29 CHRONIC BILATERAL LOW BACK PAIN WITHOUT SCIATICA: Primary | ICD-10-CM

## 2023-01-19 DIAGNOSIS — M54.50 CHRONIC BILATERAL LOW BACK PAIN WITHOUT SCIATICA: Primary | ICD-10-CM

## 2023-01-19 DIAGNOSIS — G89.29 CHRONIC PAIN OF RIGHT KNEE: ICD-10-CM

## 2023-01-19 DIAGNOSIS — M25.561 CHRONIC PAIN OF RIGHT KNEE: ICD-10-CM

## 2023-01-19 NOTE — PROGRESS NOTES
PT Evaluation     Today's date: 2023  Patient name: Stacey Chris  : 1950  MRN: [id number]  Referring provider: Michelle Mccracken PA-C  Dx:   Encounter Diagnosis     ICD-10-CM    1  Chronic bilateral low back pain without sciatica  M54 50     G89 29       2  Chronic pain of right knee  M25 561     G89 29                      Assessment/Plan  Ms Bauman is a 71 y o  female presenting to outpatient physical therapy with LBP and R knee pain which restricts their ability to participate in ADLs, work, and recreational activities without pain  Functional limitations are result of the following impairments: decreased global LE strength, decreased lumbar extension, pain with function, decreased functional core strength, mild nerve tension, and TTP lumbar paraspinals  Symptoms consistent with lumbar stenosis  PMH is sig for migraine, HTN, anxiety, depression, TKA, GERD, cancer, DDD cervical and lumbar  No further referral appears necessary at this time based upon examination results  Patient was given the opportunity to ask questions, and all questions were answered to the patient's satisfaction  The patients's greatest concerns are fear of not being able to stay active, getting back to recreational activity or sport and fear of pain getting worse  Patient appears motivated, agrees with the POC, and is a good candidate for skilled physical therapy at this time  Patient was provided with handout of HEP consisting of hip, knee, and glute strengthening, as well as lumbar stretching      Symptom irritability: Moderate   Understanding of Dx/Px/POC: good  Prognosis: fair  Negative prognostic indicators include: anxiety, depression,     Goals  STG (4 weeks)  Pain: Patient will subjectively report maximum pain decreased by 2/10  Strength: Patient's will demonstrate LE strength improved by 1/2 grade  Function: Patient increase score on FOTO (initial score: 34) by 10 points    LTG (8 weeks)  Pain: Patient will subjectively report less than 2/10 pain with functional activities  Strength: Patient's will demonstrate LE strength improved to WNL  Function: Patient will increase score on FOTO to predicted value (47) or better  Patient will be ind with HEP by [facility] [address] details: Prognosis above is given PT services 2x/week over the next 8 weeks and home program adherence  Patient would benefit from: skilled physical therapy, home exercise program  Referral necessary: no  Planned modality interventions: Hydrocollator packs, Cryotherapy and TENS  Planned therapy interventions: joint mobilization, manual therapy, massage, neuromuscular re-education, patient education, stretching, strengthening, therapeutic activities, therapeutic exercise, home exercise program, functional ROM exercises, gait training, flexibility, balance, abdominal trunk stabilization, motor coordination training, coordination and behavior modification  Frequency: 2x/week for 8 weeks  Duration in visits: 16  Plan of Care beginning date: 1/19/2023   Plan of Care expiration date: 3/16/2023  Treatment plan discussed with: patient    Subjective  IE (1/19/2023): Patient is a 71 y o  female who presents for an initial outpatient physical therapy consultation regarding their LBP and knee pain  Patient has history of R TKA in June 2022, but wasn't able to drive so she was only able to home PT  She thinks that her legs are weak because she hasn't able to keep with up with her HEP  She states that she's not having much pain in the knee, but does note difficulty with getting up off the floor  Her back pain began in 2007 starting with her hips  For a long time advil "took care of it"  She received an injection in the back about 4 weeks ago, but doesn't think it helped  She notes difficulty with getting out of chair and walking for more than 5 minutes  She gets occasional pain down the back of her thigh but no lower than her knee       Pain  Best: back 0/10, knee 0/10  Worst: back 10/10, knee 2/10  Irritability: minutes to hours  Location: LBP, groin, posterior thigh, R knee  Quality: Achy, "sore",   Aggravating factors: walking, STS transfers, getting up off the floor, "everything"   Alleviating factors: Oxy, rest, laying down  Progression: not improving    Social Support  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  Hobbies/Recreational Activities: Adult coloring and stenciling     Diagnostic Tests  MRI: 1/5/23 "Spondylosis with facet arthropathy  Mild central stenosis at L3-L4  Foraminal stenosis bilaterally at multiple levels"       Patient Goals for Therapy  "decrease pain"    Objective  Observation:  · Posture: fair in seated and in standing    Neurologic Screen  · Dermatomes: unremarkable  · Reflexes: unremarkable  · Myotomes: generally decreased strength    Functional movements  · Squat: unable  · Transitional movements: painful and slow    Joint play  Lumbar PA assessment: hypermobile  Thoracic PA assessment: hypomobile    Palpation  Paraspinals: TTP lower lumbar  Gluteals: R TTP      ROM    Lumbar  • Flexion  o ROM: WNL  o Repeated motions: no change   Patient noted LBP with coming back to neutral  • Extension  o ROM: mild decrease in ROM  o Repeated motions: no change     Left- PROM Right- PROM   Hip flexion WNL WNL   Hip extension 0 0   Hip IR 30 30   Hip ER 45 45   Hip ABD 45 45   Knee flexion WNL WNL   Knee extension WNL WNL        ROM Comments:    Strength     Left Right   Hip flexion 4+ 4   Hip extension 4 4   Hip ABD 4 4        Knee flexion 4+ 4+   Knee extension 4+ 4+   Strength comments:     Tests  · SIJ cluster :(-)  · Prone instability: (+)  · SLR: (-)  · X-SLR: (-)  · Slump: (+), mild discomfort  · RAUL: (-)  · FADIR: (-)           Precautions: n/a      Manuals 1/19            Lumbar STM                                                    Neuro Re-Ed Ther Ex             Bike             clamshells HEP            LAQ HEP            LTR HEP            STS             Sciatic nerve glide HEP                                      Ther Activity                                       Gait Training                                       Modalities                          IE/HEP

## 2023-01-19 NOTE — LETTER
2023    [address] [facility] [id number]    Patient: Romayne Dibbles Hessinger   YOB: 1950   Date of Visit: 2023     Encounter Diagnosis     ICD-10-CM    1  Chronic bilateral low back pain without sciatica  M54 50     G89 29       2  Chronic pain of right knee  M25 561     G89 29           Dear Dr Pollack Homes: Thank you for your recent referral of Breanna Membreno  Please review the attached evaluation summary from Jovita's recent visit  Please verify that you agree with the plan of care by signing the attached order  If you have any questions or concerns, please do not hesitate to call  I sincerely appreciate the opportunity to share in the care of one of your patients and hope to have another opportunity to work with you in the near future  Sincerely,    Brittney Chowdary, PT      Referring Provider:      I certify that I have read the below Plan of Care and certify the need for these services furnished under this plan of treatment while under my care  Farida Napier DO  [address] [place] [id number]  Via Fax: [phone]          PT Evaluation     Today's date: 2023  Patient name: Breanna Membreno  : 1950  MRN: [id number]  Referring provider: Neeraj Schulte PA-C  Dx:   Encounter Diagnosis     ICD-10-CM    1  Chronic bilateral low back pain without sciatica  M54 50     G89 29       2  Chronic pain of right knee  M25 561     G89 29                      Assessment/Plan  Ms Bauman is a 71 y o  female presenting to outpatient physical therapy with LBP and R knee pain which restricts their ability to participate in ADLs, work, and recreational activities without pain   Functional limitations are result of the following impairments: decreased global LE strength, decreased lumbar extension, pain with function, decreased functional core strength, mild nerve tension, and TTP lumbar paraspinals  Symptoms consistent with lumbar stenosis  PMH is sig for migraine, HTN, anxiety, depression, TKA, GERD, cancer, DDD cervical and lumbar  No further referral appears necessary at this time based upon examination results  Patient was given the opportunity to ask questions, and all questions were answered to the patient's satisfaction  The patients's greatest concerns are fear of not being able to stay active, getting back to recreational activity or sport and fear of pain getting worse  Patient appears motivated, agrees with the POC, and is a good candidate for skilled physical therapy at this time  Patient was provided with handout of HEP consisting of hip, knee, and glute strengthening, as well as lumbar stretching  Symptom irritability: Moderate   Understanding of Dx/Px/POC: good  Prognosis: fair  Negative prognostic indicators include: anxiety, depression,     Goals  STG (4 weeks)  Pain: Patient will subjectively report maximum pain decreased by 2/10  Strength: Patient's will demonstrate LE strength improved by 1/2 grade  Function: Patient increase score on FOTO (initial score: 34) by 10 points    LTG (8 weeks)  Pain: Patient will subjectively report less than 2/10 pain with functional activities  Strength: Patient's will demonstrate LE strength improved to WNL  Function: Patient will increase score on FOTO to predicted value (47) or better  Patient will be ind with HEP by [address] [place] [address] [address] details: Prognosis above is given PT services 2x/week over the next 8 weeks and home program adherence     Patient would benefit from: skilled physical therapy, home exercise program  Referral necessary: no  Planned modality interventions: Hydrocollator packs, Cryotherapy and TENS  Planned therapy interventions: joint mobilization, manual therapy, massage, neuromuscular re-education, patient education, stretching, strengthening, therapeutic activities, therapeutic exercise, home exercise program, functional ROM exercises, gait training, flexibility, balance, abdominal trunk stabilization, motor coordination training, coordination and behavior modification  Frequency: 2x/week for 8 weeks  Duration in visits: [address] beginning date: 1/19/2023   Plan of Care expiration date: 3/16/2023  Treatment plan discussed with: patient    Subjective  IE (1/19/2023): Patient is a 71 y o  female who presents for an initial outpatient physical therapy consultation regarding their LBP and knee pain  Patient has history of R TKA in June 2022, but wasn't able to drive so she was only able to home PT  She thinks that her legs are weak because she hasn't able to keep with up with her HEP  She states that she's not having much pain in the knee, but does note difficulty with getting up off the floor  Her back pain began in 2007 starting with her hips  For a long time advil "took care of it"  She received an injection in the back about 4 weeks ago, but doesn't think it helped  She notes difficulty with getting out of chair and walking for more than 5 minutes  She gets occasional pain down the back of her thigh but no lower than her knee  Pain  Best: back 0/10, knee 0/10  Worst: back 10/10, knee 2/10  Irritability: minutes to hours  Location: LBP, groin, posterior thigh, R knee  Quality: Achy, "sore",   Aggravating factors: walking, STS transfers, getting up off the floor, "everything"   Alleviating factors: Oxy, rest, laying down  Progression: not improving    Social Support  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  Hobbies/Recreational Activities: Adult coloring and stenciling     Diagnostic Tests  MRI: 1/5/23 "Spondylosis with facet arthropathy  Mild central stenosis at L3-L4   Foraminal stenosis bilaterally at multiple levels"       Patient Goals for Therapy  "decrease pain"    Objective  Observation:  · Posture: fair in seated and in standing    Neurologic Screen  · Dermatomes: unremarkable  · Reflexes: unremarkable  · Myotomes: generally decreased strength    Functional movements  · Squat: unable  · Transitional movements: painful and slow    Joint play  Lumbar PA assessment: hypermobile  Thoracic PA assessment: hypomobile    Palpation  Paraspinals: TTP lower lumbar  Gluteals: R TTP      ROM    Lumbar  • Flexion  o ROM: WNL  o Repeated motions: no change   Patient noted LBP with coming back to neutral  • Extension  o ROM: mild decrease in ROM  o Repeated motions: no change     Left- PROM Right- PROM   Hip flexion WNL WNL   Hip extension 0 0   Hip IR 30 30   Hip ER 45 45   Hip ABD 45 45   Knee flexion WNL WNL   Knee extension WNL WNL        ROM Comments:    Strength     Left Right   Hip flexion 4+ 4   Hip extension 4 4   Hip ABD 4 4        Knee flexion 4+ 4+   Knee extension 4+ 4+   Strength comments:     Tests  · SIJ cluster :(-)  · Prone instability: (+)  · SLR: (-)  · X-SLR: (-)  · Slump: (+), mild discomfort  · RAUL: (-)  · FADIR: (-)          Precautions: n/a      Manuals 1/19            Lumbar STM                                                    Neuro Re-Ed                                                                                                        Ther Ex             Bike             clamshells HEP            LAQ HEP            LTR HEP            STS             Sciatic nerve glide HEP                                      Ther Activity                                       Gait Training                                       Modalities                          IE/HEP

## 2023-01-24 ENCOUNTER — APPOINTMENT (OUTPATIENT)
Dept: RADIOLOGY | Facility: CLINIC | Age: 73
End: 2023-01-24

## 2023-01-24 ENCOUNTER — OFFICE VISIT (OUTPATIENT)
Dept: PHYSICAL THERAPY | Facility: CLINIC | Age: 73
End: 2023-01-24

## 2023-01-24 DIAGNOSIS — G89.29 CHRONIC PAIN OF RIGHT KNEE: Primary | ICD-10-CM

## 2023-01-24 DIAGNOSIS — M25.561 CHRONIC PAIN OF RIGHT KNEE: Primary | ICD-10-CM

## 2023-01-24 DIAGNOSIS — M54.50 CHRONIC BILATERAL LOW BACK PAIN WITHOUT SCIATICA: ICD-10-CM

## 2023-01-24 DIAGNOSIS — Z98.1 ARTHRODESIS STATUS: ICD-10-CM

## 2023-01-24 DIAGNOSIS — G89.29 CHRONIC BILATERAL LOW BACK PAIN WITHOUT SCIATICA: ICD-10-CM

## 2023-01-24 NOTE — PROGRESS NOTES
Daily Note     Today's date: 2023  Patient name: Claudene Crease  : 1950  MRN: [id number]  Referring provider: Reji Ho PA-C  Dx:   Encounter Diagnosis     ICD-10-CM    1  Chronic pain of right knee  M25 561     G89 29       2  Chronic bilateral low back pain without sciatica  M54 50     G89 29                      Subjective: Patient reports that she wasn't able to try her HEP over the weekend due to an eye injury  Objective: See treatment diary below  Treatment as indicated below  Assessment: Reviewed HEP to ensure accuracy  Patient was able to tolerated all exercises without an increase in pain  Patient did note mild nauseous during STS, but this was resolved with seated rest and water  Continue to progress as tolerated  Patient will continue to benefit from skilled PT in order to address impairments and improve function  Plan: Continue per plan of care  Progress treatment as tolerated         Precautions: n/a      Manuals            Lumbar STM                                                    Neuro Re-Ed                                                                                                        Ther Ex             Bike  nv           clamshells HEP OTB  2x10 ea           LAQ HEP 2#  3x10           LTR HEP 5x10"           STS  2x10           Sciatic nerve glide HEP 2x10           Hip 3 ways  2x10 ea           Mini squats  2x10           Step ups  6"  2x10           Ther Activity                                       Gait Training                                       Modalities                          IE/HEP

## 2023-01-26 ENCOUNTER — APPOINTMENT (OUTPATIENT)
Dept: PHYSICAL THERAPY | Facility: CLINIC | Age: 73
End: 2023-01-26

## 2023-01-27 ENCOUNTER — APPOINTMENT (OUTPATIENT)
Dept: PHYSICAL THERAPY | Facility: CLINIC | Age: 73
End: 2023-01-27

## 2023-01-30 ENCOUNTER — OFFICE VISIT (OUTPATIENT)
Dept: PHYSICAL THERAPY | Facility: CLINIC | Age: 73
End: 2023-01-30

## 2023-01-30 DIAGNOSIS — M25.561 CHRONIC PAIN OF RIGHT KNEE: Primary | ICD-10-CM

## 2023-01-30 DIAGNOSIS — G89.29 CHRONIC PAIN OF RIGHT KNEE: Primary | ICD-10-CM

## 2023-01-30 DIAGNOSIS — G89.29 CHRONIC BILATERAL LOW BACK PAIN WITHOUT SCIATICA: ICD-10-CM

## 2023-01-30 DIAGNOSIS — M54.50 CHRONIC BILATERAL LOW BACK PAIN WITHOUT SCIATICA: ICD-10-CM

## 2023-01-30 NOTE — PROGRESS NOTES
Daily Note     Today's date: 2023  Patient name: Gila Santiago  : 1950  MRN: [id number]  Referring provider: Patricia Saeed PA-C  Dx:   Encounter Diagnosis     ICD-10-CM    1  Chronic pain of right knee  M25 561     G89 29       2  Chronic bilateral low back pain without sciatica  M54 50     G89 29                      Subjective: Patient reports that she is sore in her ribs following a fall last week  She consulted her PCP last week  Unable to try HEP last week due to rib pain  Objective: See treatment diary below  Treatment as indicated below  Assessment: Patient was able to tolerated all exercises without an increase in pain  C/o of mild pain in the L knee during LAQ, but otherwise she tolerated exercises well  Continue to progress as tolerated  Patient will continue to benefit from skilled PT in order to address impairments and improve function  Plan: Continue per plan of care  Progress treatment as tolerated         Precautions: n/a      Manuals           Lumbar STM             Hip and knee stretching   JF                                    Neuro Re-Ed             Sciatic nerve glide HEP 2x10 2x10          clamshells HEP OTB  2x10 ea OTB  2x10 ea  5" hold          LAQ HEP 2#  3x10 2#  3x10                                                              Ther Ex             Bike  nv           LTR HEP 5x10" 5x10"          STS  2x10 2x10          Hip 3 ways  2x10 ea 2x10 ea          Mini squats  2x10 2x10          Step ups  6"  2x10 6"  2x10          Seated thoracic ext & rotation   10x10"          Open books   10x10"                                    Ther Activity                                       Gait Training                                       Modalities                          IE/HEP

## 2023-01-31 ENCOUNTER — APPOINTMENT (OUTPATIENT)
Dept: PHYSICAL THERAPY | Facility: CLINIC | Age: 73
End: 2023-01-31

## 2023-02-02 ENCOUNTER — APPOINTMENT (OUTPATIENT)
Dept: PHYSICAL THERAPY | Facility: CLINIC | Age: 73
End: 2023-02-02

## 2023-02-03 ENCOUNTER — OFFICE VISIT (OUTPATIENT)
Dept: PHYSICAL THERAPY | Facility: CLINIC | Age: 73
End: 2023-02-03

## 2023-02-03 DIAGNOSIS — M54.50 CHRONIC BILATERAL LOW BACK PAIN WITHOUT SCIATICA: ICD-10-CM

## 2023-02-03 DIAGNOSIS — G89.29 CHRONIC BILATERAL LOW BACK PAIN WITHOUT SCIATICA: ICD-10-CM

## 2023-02-03 DIAGNOSIS — G89.29 CHRONIC PAIN OF RIGHT KNEE: Primary | ICD-10-CM

## 2023-02-03 DIAGNOSIS — M25.561 CHRONIC PAIN OF RIGHT KNEE: Primary | ICD-10-CM

## 2023-02-03 NOTE — PROGRESS NOTES
Daily Note     Today's date: 2/3/2023  Patient name: Nelda Newby  : 1950  MRN: [id number]  Referring provider: Justice Elizabeth PA-C  Dx:   Encounter Diagnosis     ICD-10-CM    1  Chronic pain of right knee  M25 561     G89 29       2  Chronic bilateral low back pain without sciatica  M54 50     G89 29                      Subjective: Patient reports that her back is bothering her more today  Knee is feeling "ok"  Was on her feet quite a bit today  Objective: See treatment diary below  Treatment as indicated below  Assessment: Patient was able to tolerate progression of resistance exercises without an increase in pain  They demonstrated muscular fatigue as expected with progression  Added core exercises this time and they were tolerated well  Patient is appropriately challenged by current POC  Continue to progress as tolerated  Patient will continue to benefit from skilled PT in order to address impairments and improve function  Plan: Continue per plan of care  Progress treatment as tolerated         Precautions: n/a      Manuals 1/19 1/24 1/30 2/3         Lumbar STM             Hip and knee stretching   JF JF                                   Neuro Re-Ed             Sciatic nerve glide HEP 2x10 2x10 2x10         clamshells HEP OTB  2x10 ea OTB  2x10 ea  5" hold OTB  2x10         LAQ HEP 2#  3x10 2#  3x10 2#  3x10         Ball push down- supine    2x10  3" hold         Shoulder ext sitting on pball    OTB  3x10                                   Ther Ex             Bike  nv  5 min post session         LTR HEP 5x10" 5x10" 5x10"         STS  2x10 2x10 2x10         Hip 3 ways  2x10 ea 2x10 ea 2x10 ea         Mini squats  2x10 2x10 2x10         Step ups  6"  2x10 6"  2x10 6"  2x0         Seated thoracic ext & rotation   10x10" 10x10"         Open books   10x10" 10x10"                                   Ther Activity                                       Gait Training Modalities                          IE/HEP

## 2023-02-07 ENCOUNTER — APPOINTMENT (OUTPATIENT)
Dept: PHYSICAL THERAPY | Facility: CLINIC | Age: 73
End: 2023-02-07

## 2023-02-08 DIAGNOSIS — G43.719 INTRACTABLE CHRONIC MIGRAINE WITHOUT AURA AND WITHOUT STATUS MIGRAINOSUS: ICD-10-CM

## 2023-02-08 NOTE — TELEPHONE ENCOUNTER
----- Message from Delmy Granados sent at 2/8/2023 10:28 AM EST -----  Regarding: new script  Contact: [phone]  Please read and assist if appropriate       ----- Message -----  From: Elvira Cohen  Sent: 2/8/2023   9:54 AM EST  To: [facility]  Subject: new script                                       refill for rizatriptan with [facility] refills Please  [facility]  I have a migraine now so your assistance is appreciated  Thanks   Cintia Bauman  [phone]

## 2023-02-09 RX ORDER — RIZATRIPTAN BENZOATE 10 MG/1
TABLET, ORALLY DISINTEGRATING ORAL
Qty: 12 TABLET | Refills: 3 | Status: SHIPPED | OUTPATIENT
Start: 2023-02-09

## 2023-02-10 ENCOUNTER — OFFICE VISIT (OUTPATIENT)
Dept: PHYSICAL THERAPY | Facility: CLINIC | Age: 73
End: 2023-02-10

## 2023-02-10 DIAGNOSIS — M54.50 CHRONIC BILATERAL LOW BACK PAIN WITHOUT SCIATICA: ICD-10-CM

## 2023-02-10 DIAGNOSIS — G89.29 CHRONIC BILATERAL LOW BACK PAIN WITHOUT SCIATICA: ICD-10-CM

## 2023-02-10 DIAGNOSIS — M25.561 CHRONIC PAIN OF RIGHT KNEE: Primary | ICD-10-CM

## 2023-02-10 DIAGNOSIS — G89.29 CHRONIC PAIN OF RIGHT KNEE: Primary | ICD-10-CM

## 2023-02-10 NOTE — PROGRESS NOTES
Daily Note     Today's date: 2/10/2023  Patient name: Armond Her  : 1950  MRN: [id number]  Referring provider: Charles Mae PA-C  Dx:   Encounter Diagnosis     ICD-10-CM    1  Chronic pain of right knee  M25 561     G89 29       2  Chronic bilateral low back pain without sciatica  M54 50     G89 29                      Subjective: Patient reports that her back is bothering her today  Objective: See treatment diary below  Treatment as indicated below  Assessment: Patient was able to tolerate progression of resistance exercises without an increase in pain  They demonstrated muscular fatigue as expected with progression  Patient is appropriately challenged by current POC  Continue to progress as tolerated  Patient will continue to benefit from skilled PT in order to address impairments and improve function  Plan: Continue per plan of care  Progress treatment as tolerated         Precautions: n/a      Manuals  2/3 2/10        Lumbar STM             Hip and knee stretching   JF JF JF                                  Neuro Re-Ed             Sciatic nerve glide HEP 2x10 2x10 2x10 2x10        clamshells HEP OTB  2x10 ea OTB  2x10 ea  5" hold OTB  2x10 OTB  2x10        LAQ HEP 2#  3x10 2#  3x10 2#  3x10 2 5#  3x10        Ball push down    2x10  3" hold 2x10 3" hold        Shoulder ext sitting on pball    OTB  3x10 OTB  3x10                                  Ther Ex             Bike  nv  5 min post session 5 min pre        LTR HEP 5x10" 5x10" 5x10" 5x10"        STS  2x10 2x10 2x10 2x10        Hip 3 ways  2x10 ea 2x10 ea 2x10 ea 2x10 ea        Mini squats  2x10 2x10 2x10 2x10        Step ups  6"  2x10 6"  2x10 6"  2x10 6"  2x10        Seated thoracic ext & rotation   10x10" 10x10" 10x10"        Open books   10x10" 10x10" 10x10"                                  Ther Activity                                       Gait Training                                       Modalities IE/HEP

## 2023-02-15 ENCOUNTER — APPOINTMENT (OUTPATIENT)
Dept: PHYSICAL THERAPY | Facility: CLINIC | Age: 73
End: 2023-02-15

## 2023-02-17 ENCOUNTER — APPOINTMENT (OUTPATIENT)
Dept: PHYSICAL THERAPY | Facility: CLINIC | Age: 73
End: 2023-02-17

## 2023-02-19 ENCOUNTER — APPOINTMENT (EMERGENCY)
Dept: CT IMAGING | Facility: HOSPITAL | Age: 73
End: 2023-02-19

## 2023-02-19 ENCOUNTER — HOSPITAL ENCOUNTER (EMERGENCY)
Facility: HOSPITAL | Age: 73
Discharge: HOME | End: 2023-02-19
Attending: EMERGENCY MEDICINE

## 2023-02-19 VITALS
OXYGEN SATURATION: 98 % | SYSTOLIC BLOOD PRESSURE: 146 MMHG | TEMPERATURE: 98 F | DIASTOLIC BLOOD PRESSURE: 65 MMHG | HEART RATE: 62 BPM | RESPIRATION RATE: 18 BRPM

## 2023-02-19 DIAGNOSIS — S09.90XA CLOSED HEAD INJURY, INITIAL ENCOUNTER: Primary | ICD-10-CM

## 2023-02-20 ENCOUNTER — APPOINTMENT (OUTPATIENT)
Dept: PHYSICAL THERAPY | Facility: CLINIC | Age: 73
End: 2023-02-20

## 2023-02-20 NOTE — DISCHARGE INSTRUCTIONS
Please refer to the attached information for strict return instructions  If symptoms worsen or new symptoms develop please return to the ER  Please follow up with our concussion clinic

## 2023-02-20 NOTE — ED PROVIDER NOTES
History  Chief Complaint   Patient presents with   • Head Injury     Pt fell backwards and hit her head while ice skating approx 1430  Denies thinners  Pt c/o headache, and dizziness  Pt denies LOC      Rhetta Brittle is a 72 yo F presenting for evaluation of fall/head injury which occurred at about 2:30 pm while she was ice skating  Reports slipping and falling backwards, striking back of head on the ice  No LOC  She did feel lightheaded initially and has felt so intermittently since that time  No wounds/bleeding  Notes slight neck pain as well  Prior history of cervical fusion reported  She has a moderate headache at this time and some light sensitivity  No nausea/vomiting  No thinners/aspirin  Otherwise asymptomatic at this time  History provided by:  Patient   used: No        Prior to Admission Medications   Prescriptions Last Dose Informant Patient Reported? Taking? ACETAMINOPHEN-BUTALBITAL  MG TABS   No No   Si tab at migraine onset, repeat after 4 hours if needed  Max 2 per day and 3 per week  Galcanezumab-gnlm (Emgality) 120 MG/ML SOAJ   No No   Sig: Inject 1 mL under the skin every 30 (thirty) days   LORazepam (ATIVAN) 2 mg tablet   Yes No   Sig: Take 2 mg by mouth 3 (three) times a day as needed   Lasmiditan Succinate (REYVOW) 100 MG tablet   No No   Sig: Take 1/2- 1 tab qhs prn migraine  Caution sedation  Do not use more than one dose per 24 hours, or more than four doses per month  Melatonin ER 10 MG TBCR   No No   Sig: TAKE 1 TABLET BY MOUTH EVERYDAY AT BEDTIME   Syringe/Needle, Disp, (SYRINGE 3CC/25GX1") 25G X 1" 3 ML MISC   No No   Sig: Use syringes for B12 and/or toradol injections   Ubrogepant (Ubrelvy) 100 MG tablet   No No   Sig: One tab at migraine onset, repeat in 2 hours if needed  Max 2 per day     azelastine (ASTELIN) 0 1 % nasal spray   Yes No   Si sprays into each nostril 2 (two) times a day Use in each nostril as directed   buPROPion (WELLBUTRIN XL) 300 mg 24 hr tablet   No No   Sig: Take 1 tablet (300 mg total) by mouth daily   cetirizine (ZyrTEC) 10 mg tablet   Yes No   Sig: Take 10 mg by mouth daily   cyanocobalamin 1,000 mcg/mL   No No   Sig: Inject once per month  cyclobenzaprine (FLEXERIL) 10 mg tablet   No No   Sig: TAKE 1 TABLET BY MOUTH EVERY 12 HOURS AS NEEDED FOR MODERATE TO SEVERE NECK PAIN/ MUSCLE SPASM   dicyclomine (BENTYL) 10 mg capsule   No No   Sig: Take 1 capsule (10 mg total) by mouth 3 (three) times a day as needed (stomach pain) PRN   gabapentin (NEURONTIN) 300 mg capsule   No No   Sig: TAKE 2 CAPSULES BY MOUTH EVERY DAY   ketorolac (TORADOL) 30 mg/mL injection   No No   Si mL or 30 mg/mL IM injection p r n  migraine, repeat after 6 hours if needed  Max two injections a week   losartan (COZAAR) 50 mg tablet   Yes No   Sig: Take 50 mg by mouth daily   oxyCODONE (OxyCONTIN) 10 mg 12 hr tablet   Yes No   Sig: Take 15 mg by mouth 2 (two) times a day    predniSONE 10 mg tablet   No No   Sig: Take 4 tabs x 3 days, then 3 tabs x 3 days, then 2 tabs x 3 days, then 1 tab x 3 days, then 0 5 tab x 3 days, then stop   prochlorperazine (COMPAZINE) 10 mg tablet   No No   Sig: Take 1 tablet (10 mg total) by mouth every 6 (six) hours as needed for nausea or vomiting   rOPINIRole (REQUIP) 1 mg tablet   Yes No   Sig: Take 4 mg by mouth daily    rizatriptan (MAXALT-MLT) 10 mg disintegrating tablet   No No   Sig: Take at the onset of migraine; if symptoms continue or return, may take another dose at least 2 hours after first dose  Take no more than 2 doses in a day     sertraline (ZOLOFT) 50 mg tablet   No No   Sig: TAKE 1 TABLET BY MOUTH EVERY DAY      Facility-Administered Medications Last Administration Doses Remaining   prochlorperazine (COMPAZINE) injection 5 mg None recorded           Past Medical History:   Diagnosis Date   • Allergic rhinitis    • Anxiety    • Arthritis    • Cancer (HCC)     Skin    • Chronic pain disorder     knee and back   • Chronic prescription opiate use    • Depression    • GERD (gastroesophageal reflux disease)    • Hypertension    • Irritable bowel syndrome    • Migraines    • Spinal stenosis        Past Surgical History:   Procedure Laterality Date   • CERVICAL FUSION     • CHOLECYSTECTOMY     • COLONOSCOPY     • IL BLEPHAROPLASTY UPPER EYELID W/EXCESSIVE SKIN Bilateral 01/29/2020    Procedure: BLEPHAROPLASTY UPPER;  Surgeon: Kathleen Wilson MD;  Location: [address] [place] [address] OR;  Service: Plastics   • REPLACEMENT TOTAL KNEE Right    • ROTATOR CUFF REPAIR Bilateral        Family History   Problem Relation Age of Onset   • Breast cancer Mother    • Alzheimer's disease Father    • Depression Sister      I have reviewed and agree with the history as documented  E-Cigarette/Vaping   • E-Cigarette Use Never User      E-Cigarette/Vaping Substances     Social History     Tobacco Use   • Smoking status: Every Day     Packs/day: 0 50     Types: Cigarettes   • Smokeless tobacco: Never   Vaping Use   • Vaping Use: Never used   Substance Use Topics   • Alcohol use: Yes     Comment: occ   • Drug use: No       Review of Systems   Constitutional: Negative for chills and fever  HENT: Negative for congestion, rhinorrhea and sore throat  Eyes: Negative for pain and visual disturbance  Respiratory: Negative for cough, shortness of breath and wheezing  Cardiovascular: Negative for chest pain and palpitations  Gastrointestinal: Negative for abdominal pain, nausea and vomiting  Genitourinary: Negative for dysuria, frequency and urgency  Musculoskeletal: Negative for back pain, neck pain and neck stiffness  Skin: Negative for rash and wound  Neurological: Positive for light-headedness and headaches  Negative for dizziness, weakness and numbness  Physical Exam  Physical Exam  Constitutional:       General: She is not in acute distress  Appearance: She is well-developed  She is not diaphoretic  HENT:      Head: Normocephalic  Jaw: There is normal jaw occlusion  No trismus or tenderness  Right Ear: External ear normal  No hemotympanum  Left Ear: External ear normal  No hemotympanum  Nose: Nose normal  No nasal tenderness  Mouth/Throat:      Dentition: No dental tenderness  Eyes:      Extraocular Movements:      Right eye: Normal extraocular motion and no nystagmus  Left eye: Normal extraocular motion and no nystagmus  Conjunctiva/sclera: Conjunctivae normal       Pupils: Pupils are equal, round, and reactive to light  Cardiovascular:      Rate and Rhythm: Normal rate and regular rhythm  Heart sounds: Normal heart sounds  No murmur heard  No friction rub  No gallop  Pulmonary:      Effort: Pulmonary effort is normal  No respiratory distress  Breath sounds: Normal breath sounds  No wheezing  Abdominal:      General: There is no distension  Palpations: Abdomen is soft  Tenderness: There is no abdominal tenderness  Musculoskeletal:      Cervical back: Normal range of motion and neck supple  Comments: No midline C/T/L TTP  B/l cervical paraspinal muscular TTP  No chest wall deformity/TTP  Moves all four extremities without pain/deformity  Normal gait  Lymphadenopathy:      Cervical: No cervical adenopathy  Skin:     General: Skin is warm and dry  Capillary Refill: Capillary refill takes less than 2 seconds  Findings: No erythema or rash  Neurological:      Mental Status: She is alert and oriented to person, place, and time  Motor: No abnormal muscle tone  Coordination: Coordination normal    Psychiatric:         Behavior: Behavior normal          Thought Content:  Thought content normal          Judgment: Judgment normal          Vital Signs  ED Triage Vitals   Temperature Pulse Respirations Blood Pressure SpO2   02/19/23 1538 02/19/23 1538 02/19/23 1538 02/19/23 1538 02/19/23 1538   98 °F (36 7 °C) 73 16 152/80 96 %      Temp Source Heart Rate Source Patient Position - Orthostatic VS BP Location FiO2 (%)   02/19/23 1538 02/19/23 1538 02/19/23 1538 02/19/23 1538 --   Oral Monitor Sitting Right arm       Pain Score       02/19/23 1713       6           Vitals:    02/19/23 1538 02/19/23 1713 02/19/23 1724 02/19/23 1730   BP: 152/80 (!) 190/83 146/65 146/65   Pulse: 73 61  62   Patient Position - Orthostatic VS: Sitting Lying  Lying         Visual Acuity      ED Medications  Medications - No data to display    Diagnostic Studies  Results Reviewed     None                 CT head without contrast   Final Result by Jarett Scales MD (02/19 1915)      No acute intracranial abnormality  Workstation performed: [id number]         CT spine cervical without contrast   Final Result by Jarett Scales MD (02/19 1918)      No cervical spine fracture or traumatic malalignment  Status post C4-C7 anterior fusion  Workstation performed: [id number]                    Procedures  Procedures         ED Course                                             Medical Decision Making  Mechanical fall while ice skating with posterior head strike occurring at around 2:30 pm today  Scalp hematoma noted as well as b/l cervical paraspinal muscular TTP  No midline spinal tenderness  No LOC  Some intermittent lightheadedness as well as light sensitivity noted since injury  CT head, c-spine here unremarkable  Pt declines medication here for headache, does have medications at home for migraines already  Follow up with concussion clinic recommended, referral provided  Follow up/return to ED indications reviewed  Closed head injury, initial encounter: complicated acute illness or injury  Amount and/or Complexity of Data Reviewed  Radiology: ordered and independent interpretation performed            Disposition  Final diagnoses:   Closed head injury, initial encounter     Time reflects when diagnosis was documented in both MDM as applicable and the Disposition within this note     Time User Action Codes Description Comment    2/19/2023  7:35 PM Hoang Kaur Add [S09 90XA] Closed head injury, initial encounter       ED Disposition     ED Disposition   Discharge    Condition   Stable    Date/Time   Sun Feb 19, 2023  7:35 PM    Comment   Dinora Bauman discharge to home/self care  Follow-up Information     Follow up With Specialties Details Why [address] [facility] Emergency Department Emergency Medicine  If symptoms worsen [facility] [id number]  [address] [place] [address] Emergency Department, [address] [place], [place], [address]    Physical Therapy at [address] [place] [address] Physical Therapy Schedule an appointment as soon as possible for a visit   Kalyan Castro   [phone] Physical Therapy at [address] [place] [address], [address], [address]          Discharge Medication List as of 2/19/2023  7:36 PM      CONTINUE these medications which have NOT CHANGED    Details   ACETAMINOPHEN-BUTALBITAL  MG TABS 1 tab at migraine onset, repeat after 4 hours if needed   Max 2 per day and 3 per week , Normal      azelastine (ASTELIN) 0 1 % nasal spray 2 sprays into each nostril 2 (two) times a day Use in each nostril as directed, Historical Med      buPROPion (WELLBUTRIN XL) 300 mg 24 hr tablet Take 1 tablet (300 mg total) by mouth daily, Starting Tue 3/24/2020, Normal      cetirizine (ZyrTEC) 10 mg tablet Take 10 mg by mouth daily, Historical Med      cyanocobalamin 1,000 mcg/mL Inject once per month , Normal      cyclobenzaprine (FLEXERIL) 10 mg tablet TAKE 1 TABLET BY MOUTH EVERY 12 HOURS AS NEEDED FOR MODERATE TO SEVERE NECK PAIN/ MUSCLE SPASM, Normal      dicyclomine (BENTYL) 10 mg capsule Take 1 capsule (10 mg total) by mouth 3 (three) times a day as needed (stomach pain) PRN, Starting u 1/27/2022, Normal      gabapentin (NEURONTIN) 300 mg capsule TAKE 2 CAPSULES BY MOUTH EVERY DAY, Normal      Galcanezumab-gnlm (Emgality) 120 MG/ML SOAJ Inject 1 mL under the skin every 30 (thirty) days, Starting Wed 8/17/2022, Normal      ketorolac (TORADOL) 30 mg/mL injection 1 mL or 30 mg/mL IM injection p r n  migraine, repeat after 6 hours if needed  Max two injections a week, Normal      Lasmiditan Succinate (REYVOW) 100 MG tablet Take 1/2- 1 tab qhs prn migraine  Caution sedation  Do not use more than one dose per 24 hours, or more than four doses per month , Normal      LORazepam (ATIVAN) 2 mg tablet Take 2 mg by mouth 3 (three) times a day as needed, Starting Wed 7/29/2020, Historical Med      losartan (COZAAR) 50 mg tablet Take 50 mg by mouth daily, Starting Wed 7/14/2021, Historical Med      Melatonin ER 10 MG TBCR TAKE 1 TABLET BY MOUTH EVERYDAY AT BEDTIME, Normal      oxyCODONE (OxyCONTIN) 10 mg 12 hr tablet Take 15 mg by mouth 2 (two) times a day , Historical Med      predniSONE 10 mg tablet Take 4 tabs x 3 days, then 3 tabs x 3 days, then 2 tabs x 3 days, then 1 tab x 3 days, then 0 5 tab x 3 days, then stop, Normal      prochlorperazine (COMPAZINE) 10 mg tablet Take 1 tablet (10 mg total) by mouth every 6 (six) hours as needed for nausea or vomiting, Starting Tue 10/11/2022, Normal      rizatriptan (MAXALT-MLT) 10 mg disintegrating tablet Take at the onset of migraine; if symptoms continue or return, may take another dose at least 2 hours after first dose   Take no more than 2 doses in a day , Normal      rOPINIRole (REQUIP) 1 mg tablet Take 4 mg by mouth daily , Historical Med      sertraline (ZOLOFT) 50 mg tablet TAKE 1 TABLET BY MOUTH EVERY DAY, Normal      Syringe/Needle, Disp, (SYRINGE 3CC/25GX1") 25G X 1" 3 ML MISC Use syringes for B12 and/or toradol injections, Normal      Ubrogepant (Ubrelvy) 100 MG tablet One tab at migraine onset, repeat in 2 hours if needed    Max 2 per day , Normal                 PDMP Review       Value Time User    PDMP Reviewed  Yes 12/27/2022 10:16 AM Yovani Ventura PA-C          ED Provider  Electronically Signed by           Monico Leal PA-C  02/19/23 212

## 2023-02-21 ENCOUNTER — APPOINTMENT (OUTPATIENT)
Dept: PHYSICAL THERAPY | Facility: CLINIC | Age: 73
End: 2023-02-21

## 2023-02-22 ENCOUNTER — APPOINTMENT (OUTPATIENT)
Dept: PHYSICAL THERAPY | Facility: CLINIC | Age: 73
End: 2023-02-22

## 2023-02-24 ENCOUNTER — OFFICE VISIT (OUTPATIENT)
Dept: PHYSICAL THERAPY | Facility: CLINIC | Age: 73
End: 2023-02-24

## 2023-02-24 DIAGNOSIS — M25.561 CHRONIC PAIN OF RIGHT KNEE: Primary | ICD-10-CM

## 2023-02-24 DIAGNOSIS — G89.29 CHRONIC PAIN OF RIGHT KNEE: Primary | ICD-10-CM

## 2023-02-24 DIAGNOSIS — G89.29 CHRONIC BILATERAL LOW BACK PAIN WITHOUT SCIATICA: ICD-10-CM

## 2023-02-24 DIAGNOSIS — M54.50 CHRONIC BILATERAL LOW BACK PAIN WITHOUT SCIATICA: ICD-10-CM

## 2023-02-24 NOTE — PROGRESS NOTES
Daily Note     Today's date: 2023  Patient name: Tatyana Sommer  : 1950  MRN: [id number]  Referring provider: Humphrey Hamilton PA-C  Dx:   Encounter Diagnosis     ICD-10-CM    1  Chronic pain of right knee  M25 561     G89 29       2  Chronic bilateral low back pain without sciatica  M54 50     G89 29                      Subjective: Patient reports that she still feels dizzy at times from her fall over the weekend  Requesting to only stretch today  Objective: See treatment diary below  Treatment as indicated below  BP: 150/80    Assessment: Held all resistance exercises per patient's request  Good tolerance for manual stretching with improved flexibility post- treatment  Return to previously established POC as tolerated  Patient will continue to benefit from skilled PT in order to address impairments and improve function  Plan: Continue per plan of care  Progress treatment as tolerated         Precautions: n/a      Manuals  2/3 2/10 2/24       Lumbar STM             Hip and knee stretching   JF JF JF JF                                   Neuro Re-Ed             Sciatic nerve glide HEP 2x10 2x10 2x10 2x10 held       clamshells HEP OTB  2x10 ea OTB  2x10 ea  5" hold OTB  2x10 OTB  2x10 held       LAQ HEP 2#  3x10 2#  3x10 2#  3x10 2 5#  3x10 held       Ball push down    2x10  3" hold 2x10 3" hold held       Shoulder ext sitting on pball    OTB  3x10 OTB  3x10 held                                 Ther Ex             Bike  nv  5 min post session 5 min pre held       LTR HEP 5x10" 5x10" 5x10" 5x10" held       STS  2x10 2x10 2x10 2x10 held       Hip 3 ways  2x10 ea 2x10 ea 2x10 ea 2x10 ea held       Mini squats  2x10 2x10 2x10 2x10 held       Step ups  6"  2x10 6"  2x10 6"  2x10 6"  2x10 held       Seated thoracic ext & rotation   10x10" 10x10" 10x10" held       Open books   10x10" 10x10" 10x10" held                                 Ther Activity Gait Training                                       Modalities                          IE/HEP Pt  C/o of diffused abd.pain x 2 days.   Denies any diarrhea or vomiting.

## 2023-02-27 ENCOUNTER — APPOINTMENT (OUTPATIENT)
Dept: PHYSICAL THERAPY | Facility: CLINIC | Age: 73
End: 2023-02-27

## 2023-03-01 ENCOUNTER — OFFICE VISIT (OUTPATIENT)
Dept: PHYSICAL THERAPY | Facility: CLINIC | Age: 73
End: 2023-03-01

## 2023-03-01 ENCOUNTER — APPOINTMENT (OUTPATIENT)
Dept: PHYSICAL THERAPY | Facility: CLINIC | Age: 73
End: 2023-03-01

## 2023-03-01 DIAGNOSIS — M25.561 CHRONIC PAIN OF RIGHT KNEE: Primary | ICD-10-CM

## 2023-03-01 DIAGNOSIS — G89.29 CHRONIC PAIN OF RIGHT KNEE: Primary | ICD-10-CM

## 2023-03-01 DIAGNOSIS — G89.29 CHRONIC BILATERAL LOW BACK PAIN WITHOUT SCIATICA: ICD-10-CM

## 2023-03-01 DIAGNOSIS — M54.50 CHRONIC BILATERAL LOW BACK PAIN WITHOUT SCIATICA: ICD-10-CM

## 2023-03-01 NOTE — PROGRESS NOTES
Daily Note     Today's date: 3/1/2023  Patient name: Jatinder Garcia  : 1950  MRN: [id number]  Referring provider: Ramses Trujillo PA-C  Dx:   Encounter Diagnosis     ICD-10-CM    1  Chronic pain of right knee  M25 561     G89 29       2  Chronic bilateral low back pain without sciatica  M54 50     G89 29                      Subjective: Patient reports that her head symptoms are improving  Knees and back are painful today  Would like to focus on her knee strength      Objective: See treatment diary below  Treatment as indicated below  Assessment: Patient able to return to previous established LE strengthening exercises  Continues to limited secondary to pain and weakness  Patient will continue to benefit from skilled PT in order to address impairments and improve function  Plan: Continue per plan of care  Progress treatment as tolerated         Precautions: n/a      Manuals 1/19 1/24 1/30 2/3 2/10 2/24 3/1      Lumbar STM             Hip and knee stretching   JF JF JF JF   JF                                Neuro Re-Ed             Sciatic nerve glide HEP 2x10 2x10 2x10 2x10 held       clamshells HEP OTB  2x10 ea OTB  2x10 ea  5" hold OTB  2x10 OTB  2x10 held Pink  2x10      LAQ HEP 2#  3x10 2#  3x10 2#  3x10 2 5#  3x10 held 2 5#  3x10      Ball push down    2x10  3" hold 2x10 3" hold held       Shoulder ext sitting on pball    OTB  3x10 OTB  3x10 held                                 Ther Ex             Bike  nv  5 min post session 5 min pre held       LTR HEP 5x10" 5x10" 5x10" 5x10" held       STS  2x10 2x10 2x10 2x10 held 2x10      Hip 3 ways  2x10 ea 2x10 ea 2x10 ea 2x10 ea held 2x10ea      Mini squats  2x10 2x10 2x10 2x10 held 2x10      Step ups  6"  2x10 6"  2x10 6"  2x10 6"  2x10 held 6"  2x10      Seated thoracic ext & rotation   10x10" 10x10" 10x10" held       Open books   10x10" 10x10" 10x10" held                                 Ther Activity Gait Training                                       Modalities                          IE/HEP

## 2023-03-03 ENCOUNTER — OFFICE VISIT (OUTPATIENT)
Dept: PHYSICAL THERAPY | Facility: CLINIC | Age: 73
End: 2023-03-03

## 2023-03-03 DIAGNOSIS — M25.561 CHRONIC PAIN OF RIGHT KNEE: Primary | ICD-10-CM

## 2023-03-03 DIAGNOSIS — G89.29 CHRONIC PAIN OF RIGHT KNEE: Primary | ICD-10-CM

## 2023-03-03 DIAGNOSIS — G89.29 CHRONIC BILATERAL LOW BACK PAIN WITHOUT SCIATICA: ICD-10-CM

## 2023-03-03 DIAGNOSIS — M54.50 CHRONIC BILATERAL LOW BACK PAIN WITHOUT SCIATICA: ICD-10-CM

## 2023-03-03 NOTE — PROGRESS NOTES
Daily Note     Today's date: 3/3/2023  Patient name: Amaya Warner  : 1950  MRN: [id number]  Referring provider: George Doherty PA-C  Dx:   Encounter Diagnosis     ICD-10-CM    1  Chronic pain of right knee  M25 561     G89 29       2  Chronic bilateral low back pain without sciatica  M54 50     G89 29                      Subjective: Patient reports that she was sore until the next day after last session  Objective: See treatment diary below  Treatment as indicated below  Assessment: Patient able to tolerate all exercises as prescribed  Continues to limited secondary to pain and weakness  Patient will continue to benefit from skilled PT in order to address impairments and improve function  Plan: Continue per plan of care  Progress treatment as tolerated  Precautions: n/a      Manuals 1/19 1/24 1/30 2/3 2/10 2/24 3/1 3/3     Lumbar STM             Hip and knee stretching   JF JF JF JF   JF JF                               Neuro Re-Ed             Sciatic nerve glide HEP 2x10 2x10 2x10 2x10 held       clamshells HEP OTB  2x10 ea OTB  2x10 ea  5" hold OTB  2x10 OTB  2x10 held Pink  2x10 Pink  2x10     LAQ HEP 2#  3x10 2#  3x10 2#  3x10 2 5#  3x10 held 2 5#  3x10 2 5#  3x10     Ball push down    2x10  3" hold 2x10 3" hold held       Shoulder ext sitting on pball    OTB  3x10 OTB  3x10 held                                 Ther Ex             Bike  nv  5 min post session 5 min pre held  1 min  Did no trisha       LTR HEP 5x10" 5x10" 5x10" 5x10" held       STS  2x10 2x10 2x10 2x10 held 2x10 2x10     Hip 3 ways  2x10 ea 2x10 ea 2x10 ea 2x10 ea held 2x10ea 2x10 ea     Mini squats  2x10 2x10 2x10 2x10 held 2x10 2x10     Step ups  6"  2x10 6"  2x10 6"  2x10 6"  2x10 held 6"  2x10  6"  2x10     Seated thoracic ext & rotation   10x10" 10x10" 10x10" held       Open books   10x10" 10x10" 10x10" held                                 Ther Activity                                       Gait Training                                       Modalities                          IE/HEP

## 2023-03-08 ENCOUNTER — TELEPHONE (OUTPATIENT)
Dept: NEUROLOGY | Facility: CLINIC | Age: 73
End: 2023-03-08

## 2023-03-08 ENCOUNTER — APPOINTMENT (OUTPATIENT)
Dept: PHYSICAL THERAPY | Facility: CLINIC | Age: 73
End: 2023-03-08

## 2023-03-08 NOTE — TELEPHONE ENCOUNTER
Left voicemail for pt to call back to confirm upcoming appointment with [facility] for 03/15

## 2023-03-10 ENCOUNTER — OFFICE VISIT (OUTPATIENT)
Dept: PHYSICAL THERAPY | Facility: CLINIC | Age: 73
End: 2023-03-10

## 2023-03-10 DIAGNOSIS — G89.29 CHRONIC BILATERAL LOW BACK PAIN WITHOUT SCIATICA: ICD-10-CM

## 2023-03-10 DIAGNOSIS — M54.50 CHRONIC BILATERAL LOW BACK PAIN WITHOUT SCIATICA: ICD-10-CM

## 2023-03-10 DIAGNOSIS — G89.29 CHRONIC PAIN OF RIGHT KNEE: Primary | ICD-10-CM

## 2023-03-10 DIAGNOSIS — M25.561 CHRONIC PAIN OF RIGHT KNEE: Primary | ICD-10-CM

## 2023-03-10 NOTE — PROGRESS NOTES
Daily Note     Today's date: 3/10/2023  Patient name: Dl Jesus  : 1950  MRN: [id number]  Referring provider: Magdy Burdick PA-C  Dx:   Encounter Diagnosis     ICD-10-CM    1  Chronic pain of right knee  M25 561     G89 29       2  Chronic bilateral low back pain without sciatica  M54 50     G89 29                      Subjective: Patient reports she is having more R hip pain today  Objective: See treatment diary below  Treatment as indicated below  Assessment: Patient had fair tolerance for exercises this session  Continues to limited secondary to pain and weakness of the R hip  Patient will continue to benefit from skilled PT in order to address impairments and improve function  Plan: Continue per plan of care  Progress treatment as tolerated         Precautions: n/a      Manuals 1/19 1/24 1/30 2/3 2/10 2/24 3/1 3/3 3/10    Lumbar STM             Hip and knee stretching   JF JF JF JF   JF JF JF                              Neuro Re-Ed             Sciatic nerve glide HEP 2x10 2x10 2x10 2x10 held       clamshells HEP OTB  2x10 ea OTB  2x10 ea  5" hold OTB  2x10 OTB  2x10 held Pink  2x10 Pink  2x10 Pink  2x10    LAQ HEP 2#  3x10 2#  3x10 2#  3x10 2 5#  3x10 held 2 5#  3x10 2 5#  3x10 2 5#  3x10    Ball push down    2x10  3" hold 2x10 3" hold held       Shoulder ext sitting on pball    OTB  3x10 OTB  3x10 held                                 Ther Ex             Bike  nv  5 min post session 5 min pre held  1 min  Did no trisha  3 min    LTR HEP 5x10" 5x10" 5x10" 5x10" held       STS  2x10 2x10 2x10 2x10 held 2x10 2x10 2x10    Hip 3 ways  2x10 ea 2x10 ea 2x10 ea 2x10 ea held 2x10ea 2x10 ea 2x10 ea    Mini squats  2x10 2x10 2x10 2x10 held 2x10 2x10 2x10    Step ups  6"  2x10 6"  2x10 6"  2x10 6"  2x10 held 6"  2x10  6"  2x10 6"  2x10    Seated thoracic ext & rotation   10x10" 10x10" 10x10" held       Open books   10x10" 10x10" 10x10" held                                 Ther Activity Gait Training                                       Modalities                          IE/HEP

## 2023-03-10 NOTE — TELEPHONE ENCOUNTER
Hi, this is [employer], 1950  I have an appointment with [facility] on Wednesday at 945  I'm calling to confirm  Thank you  Fabrice Jordan [phone]    Appt confirmed    Called pt to let her know that I receive her message and her appt 3/15/23 at 0945 confirmed

## 2023-03-13 ENCOUNTER — APPOINTMENT (OUTPATIENT)
Dept: PHYSICAL THERAPY | Facility: CLINIC | Age: 73
End: 2023-03-13

## 2023-03-14 ENCOUNTER — APPOINTMENT (OUTPATIENT)
Dept: PHYSICAL THERAPY | Facility: CLINIC | Age: 73
End: 2023-03-14

## 2023-03-15 ENCOUNTER — OFFICE VISIT (OUTPATIENT)
Dept: NEUROLOGY | Facility: CLINIC | Age: 73
End: 2023-03-15

## 2023-03-15 VITALS — TEMPERATURE: 96.9 F | DIASTOLIC BLOOD PRESSURE: 78 MMHG | SYSTOLIC BLOOD PRESSURE: 126 MMHG

## 2023-03-15 DIAGNOSIS — F51.01 PRIMARY INSOMNIA: ICD-10-CM

## 2023-03-15 DIAGNOSIS — G43.701 CHRONIC MIGRAINE WITHOUT AURA WITH STATUS MIGRAINOSUS, NOT INTRACTABLE: Primary | ICD-10-CM

## 2023-03-15 DIAGNOSIS — G43.719 INTRACTABLE CHRONIC MIGRAINE WITHOUT AURA AND WITHOUT STATUS MIGRAINOSUS: ICD-10-CM

## 2023-03-15 RX ORDER — PROMETHAZINE HYDROCHLORIDE 6.25 MG/5ML
12.5 SYRUP ORAL 3 TIMES DAILY PRN
Qty: 118 ML | Refills: 0 | Status: SHIPPED | OUTPATIENT
Start: 2023-03-15 | End: 2023-03-28

## 2023-03-15 RX ORDER — SUMATRIPTAN 20 MG/1
SPRAY NASAL
Qty: 4 EACH | Refills: 2 | Status: SHIPPED | OUTPATIENT
Start: 2023-03-15

## 2023-03-15 RX ORDER — GABAPENTIN 300 MG/1
CAPSULE ORAL
Qty: 180 CAPSULE | Refills: 2 | Status: SHIPPED | OUTPATIENT
Start: 2023-03-15

## 2023-03-15 RX ORDER — ATOGEPANT 30 MG/1
TABLET ORAL
Qty: 90 TABLET | Refills: 0 | Status: SHIPPED | OUTPATIENT
Start: 2023-03-15

## 2023-03-15 NOTE — PATIENT INSTRUCTIONS
"Glenn Mercado- consider for migraine prevention      Headache management instructions  - When patient has a moderate to severe headache, they should seek rest, initiate relaxation and apply cold compresses to the head  - Maintain regular sleep schedule  Adults need at least 7-8 hours of uninterrupted a night  - Limit over the counter medications such as Tylenol, Ibuprofen, Aleve, Excedrin  (No more than 2- 3 times a week or max 10 a month)  - Maintain headache diary  Free MARIKA for a smart phone, which can be used is \"Migraine jose\"  - Limit caffeine to 1-2 cups 8 to 16 oz a day or less  - Avoid dietary trigger  (aged cheese, peanuts, MSG, aspartame and nitrates)  - Patient is to have regular frequent meals to prevent headache onset  - Please drink at least 64 ounces of water a day to help remain hydrated      "

## 2023-03-15 NOTE — PROGRESS NOTES
Patient ID: Mehul Fontenot is a 71 y o  female  Assessment/Plan:     Diagnoses and all orders for this visit:    Chronic migraine without aura with status migrainosus, not intractable  -     Discontinue: promethazine (PHENERGAN) 12 5 mg/10 mL syrup; Take 10 mL (12 5 mg total) by mouth 3 (three) times a day as needed for nausea or vomiting  -     SUMAtriptan (IMITREX) 20 MG/ACT nasal spray; 1 spray in one nostril at headache onset, repeat after 2 hours if needed  No more than 2 doses per day  Do not take maxalt with this  -     Atogepant (Qulipta) 30 MG TABS; Once daily with breakfast    Intractable chronic migraine without aura and without status migrainosus  -     gabapentin (NEURONTIN) 300 mg capsule; TAKE 2 CAPSULES BY MOUTH qhs  -     Lasmiditan Succinate (REYVOW) 100 MG tablet; Take 1/2- 1 tab qhs prn migraine  Caution sedation  Do not use more than one dose per 24 hours, or more than four doses per month  Primary insomnia  -     gabapentin (NEURONTIN) 300 mg capsule; TAKE 2 CAPSULES BY MOUTH qhs         Migraine headaches continue to be controlled with Botox injections every 90 days  Since starting botox, the patient reports greater than 7 days of migraine relief from baseline, correlated with headache diary, decreased abortive medication use and decreased ER visits  She is asking for additional prevention medication due to a slight increase in migraine frequency since cervical fusion in October so we discussed a trial of Qulipta daily and side effects reviewed  Continue gabapentin 600 mg nightly, also addressing neck pain  As needed sumatriptan nasal spray, for quicker onset  She still likes to take maxalt which has historically worked the best but states sometimes she needs a quicker acting medication  She understands that she should not take Maxalt and sumatriptan together within 24 hours of each other  She will only get about 3-4 nasal sprays per month  If that fails, add Reyvow  Reminded her to only take this at nighttime or when not driving and not to drive after 12 hours of taking this as it can cause sedation  Fortunately it does not cause sedation for her but she should be extra cautious in case  She is asking for alternative medication for nausea, recommended Phenergan solution since it is easier to swallow than tablets at times  If not she can trial the tablet or suppository  Last office note 8/10/2021, will refer to this  The patient should not hesitate to call me prior to her follow up with any questions or concerns  Subjective:    HPI     Ms Monie Nguyen is a very pleasant 67-year-old female who is here for neurological follow-up for migraine headaches  Recently status post L5-S1 epidural 12/14/2022  Now following with [facility] pain management status post bilateral L4-5 L5-S1 MBB #1 1/3/1723  S/p C4-7 ACDF 10/24/2022  She reports 2 migraine headaches per week  She typically is triggered by stress, and sometimes physical therapy triggers her headaches  When she works on weekends it increases her stress and headaches  She reports that migraine headaches have made a slight increase in frequency since she had a cervical fusion status post 10/24/2022  Right arm and shoulder pain significantly improved after the surgery thankfully  She states her activities of stenciling which she loves to do sometimes makes her R shoulder act up  Due to migraines being slightly worse she wants to discuss starting a new medication in addition to Botox  She reports that Maxalt ODT works well to alleviate the migraine within about 20 minutes, and sometimes she needs to take a second one after 2 hours  Toradol injection does not work well  Ativan does help with neck tension and myofascial pain  She took a half tab of reyvow once to try it out and it did reduce the migraine but the patient does not report sedation or any other side effects to this      She has a tendency to get nausea and sick with a migraine and is asking for an alternative nausea medication other than Compazine and Zofran  The following portions of the patient's history were reviewed and updated as appropriate:   She  has a past medical history of Allergic rhinitis, Anxiety, Arthritis, Cancer ([facility] [address] ), Chronic pain disorder, Chronic prescription opiate use, Depression, GERD (gastroesophageal reflux disease), Hypertension, Irritable bowel syndrome, Migraines, and Spinal stenosis  She   Patient Active Problem List    Diagnosis Date Noted   • Chronic migraine without aura with status migrainosus, not intractable 05/24/2022   • Cervical radiculopathy    • Carpal tunnel syndrome on right    • Foraminal stenosis of cervical region 10/03/2021   • Degenerative disc disease, cervical 10/03/2021   • Cigarette smoker 10/03/2021   • Hypertension 10/03/2021   • B12 deficiency 11/05/2020   • Restless leg syndrome 11/05/2020   • Iron deficiency 11/05/2020   • Myofascial muscle pain 09/25/2020   • Myofascial pain 08/10/2020   • Intractable chronic migraine without aura and without status migrainosus 08/04/2020   • Intractable migraine without aura 07/30/2020   • 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   • Primary insomnia 05/07/2019   • Insomnia due to other mental disorder 04/09/2019   • Anxiety and depression 01/10/2019   • Recurrent major depressive disorder, in partial remission ([facility] [address] ) 04/25/2018     She  has a past surgical history that includes Colonoscopy; Cholecystectomy; Rotator cuff repair (Bilateral); pr blepharoplasty upper eyelid w/excessive skin (Bilateral, 01/29/2020); Replacement total knee (Right); and Cervical fusion  Her family history includes Alzheimer's disease in her father; Breast cancer in her mother; Depression in her sister  She  reports that she has been smoking cigarettes  She has been smoking an average of  5 packs per day   She has never used smokeless tobacco  She reports current alcohol use  She reports that she does not use drugs  Current Outpatient Medications   Medication Sig Dispense Refill   • ACETAMINOPHEN-BUTALBITAL  MG TABS 1 tab at migraine onset, repeat after 4 hours if needed  Max 2 per day and 3 per week  20 tablet 2   • Atogepant (Qulipta) 30 MG TABS Once daily with breakfast 90 tablet 0   • azelastine (ASTELIN) 0 1 % nasal spray 2 sprays into each nostril 2 (two) times a day Use in each nostril as directed     • buPROPion (WELLBUTRIN XL) 300 mg 24 hr tablet Take 1 tablet (300 mg total) by mouth daily 30 tablet 5   • cetirizine (ZyrTEC) 10 mg tablet Take 10 mg by mouth daily     • cyanocobalamin 1,000 mcg/mL Inject once per month  3 mL 4   • cyclobenzaprine (FLEXERIL) 10 mg tablet TAKE 1 TABLET BY MOUTH EVERY 12 HOURS AS NEEDED FOR MODERATE TO SEVERE NECK PAIN/ MUSCLE SPASM 30 tablet 2   • dicyclomine (BENTYL) 10 mg capsule Take 1 capsule (10 mg total) by mouth 3 (three) times a day as needed (stomach pain) PRN 90 capsule 0   • gabapentin (NEURONTIN) 300 mg capsule TAKE 2 CAPSULES BY MOUTH qhs 180 capsule 2   • ketorolac (TORADOL) 30 mg/mL injection 1 mL or 30 mg/mL IM injection p r n  migraine, repeat after 6 hours if needed  Max two injections a week 4 mL 1   • Lasmiditan Succinate (REYVOW) 100 MG tablet Take 1/2- 1 tab qhs prn migraine  Caution sedation  Do not use more than one dose per 24 hours, or more than four doses per month   4 tablet 0   • LORazepam (ATIVAN) 2 mg tablet Take 2 mg by mouth 3 (three) times a day as needed     • losartan (COZAAR) 50 mg tablet Take 50 mg by mouth daily     • oxyCODONE (OxyCONTIN) 10 mg 12 hr tablet Take 15 mg by mouth 2 (two) times a day      • predniSONE 10 mg tablet Take 4 tabs x 3 days, then 3 tabs x 3 days, then 2 tabs x 3 days, then 1 tab x 3 days, then 0 5 tab x 3 days, then stop 32 tablet 0   • rizatriptan (MAXALT-MLT) 10 mg disintegrating tablet Take at the onset of migraine; if symptoms continue or return, may take "another dose at least 2 hours after first dose  Take no more than 2 doses in a day  12 tablet 3   • rOPINIRole (REQUIP) 1 mg tablet Take 4 mg by mouth daily      • sertraline (ZOLOFT) 50 mg tablet TAKE 1 TABLET BY MOUTH EVERY DAY 90 tablet 1   • SUMAtriptan (IMITREX) 20 MG/ACT nasal spray 1 spray in one nostril at headache onset, repeat after 2 hours if needed  No more than 2 doses per day  Do not take maxalt with this  4 each 2   • Syringe/Needle, Disp, (SYRINGE 3CC/25GX1\") 25G X 1\" 3 ML MISC Use syringes for B12 and/or toradol injections 12 each 2   • promethazine (PHENERGAN) 12 5 MG tablet Take 1-2 tablets (12 5-25 mg total) by mouth every 6 (six) hours as needed for nausea or vomiting 30 tablet 0     Current Facility-Administered Medications   Medication Dose Route Frequency Provider Last Rate Last Admin   • prochlorperazine (COMPAZINE) injection 5 mg  5 mg Intramuscular Q4H PRN Maricel Andino MD         She is allergic to metaxalone, codeine, morphine, other, eggs or egg-derived products - food allergy, and tapentadol            Objective:    Blood pressure 126/78, temperature (!) 96 9 °F (36 1 °C), temperature source Temporal     Physical Exam    Neurological Exam  On neurologic exam, the patient is alert and oriented to time and place  Speech is fluent and articulate, and the patient follows commands appropriately  Judgment and affect appear normal  Pupils are equally round and reactive to light and extraocular muscles are intact without nystagmus  Face is symmetric, and tongue, uvula, and palate are midline  Hearing is intact  Motor examination reveals intact strength throughout  Reflexes UEs non focal  Normal gait is steady  ROS:    Review of Systems  Constitutional: Negative  Negative for appetite change and fever  HENT: Negative  Negative for hearing loss, tinnitus, trouble swallowing and voice change  Eyes: Negative  Negative for photophobia, pain and visual disturbance     Respiratory: " Negative  Negative for shortness of breath  Cardiovascular: Negative  Negative for palpitations  Gastrointestinal: Negative  Negative for nausea and vomiting  Endocrine: Negative  Negative for cold intolerance  Genitourinary: Negative  Negative for dysuria, frequency and urgency  Musculoskeletal: Negative  Negative for gait problem, myalgias and neck pain  Skin: Negative  Negative for rash  Allergic/Immunologic: Negative  Neurological: Negative  Negative for dizziness, tremors, seizures, syncope, facial asymmetry, speech difficulty, weakness, light-headedness, numbness and headaches  Hematological: Negative  Does not bruise/bleed easily  Psychiatric/Behavioral: Negative  Negative for confusion, hallucinations and sleep disturbance  The following portions of the patient's history were reviewed and updated as appropriate: allergies, current medications/ medication history, past family history, past medical history, past social history, past surgical history and problem list     Review of systems was reviewed and otherwise unremarkable from a neurological perspective

## 2023-03-22 ENCOUNTER — TELEPHONE (OUTPATIENT)
Dept: NEUROLOGY | Facility: CLINIC | Age: 73
End: 2023-03-22

## 2023-03-23 NOTE — TELEPHONE ENCOUNTER
Called pharmacy for insurance info:    [facility] [id number]  [facility]  ID #[id number]  [facility]  [phone]

## 2023-03-23 NOTE — TELEPHONE ENCOUNTER
Called pharmacy for insurance info:    [employer] [id number]  Leon Pelaez  ID #[id number]  [facility]  [phone]    Unable to complete PA via CMM, called insurance    Silver script   Was transferred to phone #[phone]    Was told Jennifer Engel is not a covered medication under the plan; referred my to phone # [phone] for alternatives, fax is [phone]. Will call same for alternatives.

## 2023-03-24 ENCOUNTER — APPOINTMENT (OUTPATIENT)
Dept: PHYSICAL THERAPY | Facility: CLINIC | Age: 73
End: 2023-03-24

## 2023-03-24 NOTE — PROGRESS NOTES
PT Evaluation     Today's date: 2023  Patient name: Thao Wilson  : 1950  MRN: [id number]  Referring provider: Khai Will PA-C  Dx:   Encounter Diagnosis     ICD-10-CM    1  Chronic bilateral low back pain without sciatica  M54 50     G89 29       2  Chronic pain of right knee  M25 561     G89 29                      Assessment/Plan  Patient is a 71 y o  female presenting to OP PT LBP and R knee pain  They have attended 9 visits over 8 weeks  Since starting therapy pt has made the following progress towards goals:  1) Pain: Patient reports that their max pain has decreased to *** from ***  2) Range of motion: Patient's *** ROM improved ***  3 )Strength: Patient's *** strength has improved to ***  4) Self rated functional score: FOTO score has increase from *** to ***     Patient is progressing slowly  Progress has been limited by inconsistent attendance to PT sessions and recent fall resulting in concussion  They continue to have deficits with ***  Therapy has consisted of ***  Their program will progress to include ***  Continue to progress as tolerated  Patient will continue to benefit from skilled PT in order to address impairments and improve function  See updated goals below  Goals  STG (4 weeks)  Pain: Patient will subjectively report maximum pain decreased by 2/10  Strength: Patient's will demonstrate LE strength improved by 1/2 grade  Function: Patient increase score on FOTO (initial score: 34) by 10 points    LTG (8 weeks)  Pain: Patient will subjectively report less than 2/10 pain with functional activities  Strength: Patient's will demonstrate LE strength improved to WNL  Function: Patient will increase score on FOTO to predicted value (47) or better  Patient will be ind with HEP by [facility] [address] details: Prognosis above is given PT services 2x/week over the next 6 weeks and home program adherence     Patient would benefit from: skilled physical therapy, home "exercise program  Referral necessary: no  Planned modality interventions: Hydrocollator packs, Cryotherapy and TENS  Planned therapy interventions: joint mobilization, manual therapy, massage, neuromuscular re-education, patient education, stretching, strengthening, therapeutic activities, therapeutic exercise, home exercise program, functional ROM exercises, gait training, flexibility, balance, abdominal trunk stabilization, motor coordination training, coordination and behavior modification  Frequency: 2x/week for 6 weeks  Duration in visits: 12  Plan of Care beginning date: 3/24/2023    Plan of Care expiration date: 5/5/2023  Treatment plan discussed with: patient    Subjective  UPDATE 3/24/2023:     IE (1/19/2023): Patient is a 71 y o  female who presents for an initial outpatient physical therapy consultation regarding their LBP and knee pain  Patient has history of R TKA in June 2022, but wasn't able to drive so she was only able to home PT  She thinks that her legs are weak because she hasn't able to keep with up with her HEP  She states that she's not having much pain in the knee, but does note difficulty with getting up off the floor  Her back pain began in 2007 starting with her hips  For a long time advil \"took care of it\"  She received an injection in the back about 4 weeks ago, but doesn't think it helped  She notes difficulty with getting out of chair and walking for more than 5 minutes  She gets occasional pain down the back of her thigh but no lower than her knee  Pain  Best: back 0/10, knee 0/10  Worst: back 10/10, knee 2/10  Irritability: minutes to hours     Location: LBP, groin, posterior thigh, R knee  Quality: Achy, \"sore\",   Aggravating factors: walking, STS transfers, getting up off the floor, \"everything\"   Alleviating factors: Oxy, rest, laying down  Progression: not improving    Social Support  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  Hobbies/Recreational " "Activities: Adult coloring and stenciling     Diagnostic Tests  MRI: 1/5/23 \"Spondylosis with facet arthropathy  Mild central stenosis at L3-L4  Foraminal stenosis bilaterally at multiple levels\"       Patient Goals for Therapy  \"decrease pain\"    Objective  Observation:  · Posture: fair in seated and in standing    Neurologic Screen  · Dermatomes: unremarkable  · Reflexes: unremarkable  · Myotomes: generally decreased strength    Functional movements  · Squat: unable  · Transitional movements: painful and slow    Joint play  Lumbar PA assessment: hypermobile  Thoracic PA assessment: hypomobile    Palpation  Paraspinals: TTP lower lumbar  Gluteals: R TTP      ROM    Lumbar  • Flexion  o ROM: WNL  o Repeated motions: no change   Patient noted LBP with coming back to neutral  • Extension  o ROM: mild decrease in ROM  o Repeated motions: no change     Left- PROM Right- PROM   Hip flexion WNL WNL   Hip extension 0 0   Hip IR 30 30   Hip ER 45 45   Hip ABD 45 45   Knee flexion WNL WNL   Knee extension WNL WNL        ROM Comments:    Strength     Left Right   Hip flexion 4+ 4   Hip extension 4 4   Hip ABD 4 4        Knee flexion 4+ 4+   Knee extension 4+ 4+   Strength comments:     Tests  · SIJ cluster :(-)  · Prone instability: (+)  · SLR: (-)  · X-SLR: (-)  · Slump: (+), mild discomfort  · RAUL: (-)  · FADIR: (-)           Precautions: n/a       Manuals 1/19 1/24 1/30 2/3 2/10 2/24 3/1 3/3 3/10     Lumbar STM                       Hip and knee stretching     LILIANA FORD JF                                                     Neuro Re-Ed                       Sciatic nerve glide HEP 2x10 2x10 2x10 2x10 held           clamshells HEP OTB  2x10 ea OTB  2x10 ea  5\" hold OTB  2x10 OTB  2x10 held Pink  2x10 Pink  2x10 Pink  2x10     LAQ HEP 2#  3x10 2#  3x10 2#  3x10 2 5#  3x10 held 2 5#  3x10 2 5#  3x10 2 5#  3x10     Ball push down       2x10  3\" hold 2x10 3\" hold held           Shoulder ext sitting on pball       " "OTB  3x10 OTB  3x10 held                                                           Ther Ex                       Bike   nv   5 min post session 5 min pre held   1 min  Did no trisha  3 min     LTR HEP 5x10\" 5x10\" 5x10\" 5x10\" held           STS   2x10 2x10 2x10 2x10 held 2x10 2x10 2x10     Hip 3 ways   2x10 ea 2x10 ea 2x10 ea 2x10 ea held 2x10ea 2x10 ea 2x10 ea     Mini squats   2x10 2x10 2x10 2x10 held 2x10 2x10 2x10     Step ups   6\"  2x10 6\"  2x10 6\"  2x10 6\"  2x10 held 6\"  2x10  6\"  2x10 6\"  2x10     Seated thoracic ext & rotation     10x10\" 10x10\" 10x10\" held           Open books     10x10\" 10x10\" 10x10\" held                                                           Ther Activity                                                                       Gait Training                                                                       Modalities                                               IE/HEP                                                                            "

## 2023-03-24 NOTE — TELEPHONE ENCOUNTER
I called number I was directed to by insurance for alternatives, [phone] and was told this is non formulary and would most likely be more expensive than formulary for patient even if approved however she could not offer any alternatives. Initiated PA verbal, case:   [id number]    Determination pending.

## 2023-03-27 NOTE — TELEPHONE ENCOUNTER
Patient sent my chart message:  this is the medicine that needs a prior authorization Nhi Hdz thank you    Recd denial letter for [facility] Reimbursement limitations have been placed on high cost medications (more than $3,000  please provide pharmacy with a prescription and have the claim rebilled for no more than 30 day    I called pharmacy; was able to process claim for 30 days (he said he would change script to read 30 day with 2 refills); bypassed medicare D and PACE paid, $15 copay. I called patient and left detailed message explaining same.

## 2023-03-28 ENCOUNTER — EVALUATION (OUTPATIENT)
Dept: PHYSICAL THERAPY | Facility: CLINIC | Age: 73
End: 2023-03-28

## 2023-03-28 ENCOUNTER — PROCEDURE VISIT (OUTPATIENT)
Dept: NEUROLOGY | Facility: CLINIC | Age: 73
End: 2023-03-28

## 2023-03-28 VITALS — TEMPERATURE: 94.6 F | HEART RATE: 71 BPM | SYSTOLIC BLOOD PRESSURE: 141 MMHG | DIASTOLIC BLOOD PRESSURE: 63 MMHG

## 2023-03-28 DIAGNOSIS — M54.50 CHRONIC BILATERAL LOW BACK PAIN WITHOUT SCIATICA: Primary | ICD-10-CM

## 2023-03-28 DIAGNOSIS — G89.29 CHRONIC PAIN OF RIGHT KNEE: ICD-10-CM

## 2023-03-28 DIAGNOSIS — M25.561 CHRONIC PAIN OF RIGHT KNEE: ICD-10-CM

## 2023-03-28 DIAGNOSIS — G43.701 CHRONIC MIGRAINE WITHOUT AURA WITH STATUS MIGRAINOSUS, NOT INTRACTABLE: Primary | ICD-10-CM

## 2023-03-28 DIAGNOSIS — G89.29 CHRONIC BILATERAL LOW BACK PAIN WITHOUT SCIATICA: Primary | ICD-10-CM

## 2023-03-28 RX ORDER — PROMETHAZINE HYDROCHLORIDE 12.5 MG/1
12.5-25 TABLET ORAL EVERY 6 HOURS PRN
Qty: 30 TABLET | Refills: 0 | Status: SHIPPED | OUTPATIENT
Start: 2023-03-28

## 2023-03-28 NOTE — PATIENT INSTRUCTIONS
Botox Therapy  Important Information    Our goal is to make sure you fully understand how Botox Therapy treatment may benefit you and to help you understand how you can play an active role in your treatments and ongoing care  Please review the following information below  Call our office IMMEDIATELY @ [phone] and speak to one of our Botox Coordinators if you have a change in insurance  (a prior authorization is required and accurate information is vital)  Please call at least 24 hours in advance if you can't make your appointment  Appointments are scheduled every 91 days (this will be scheduled in advance before leaving the office)  You must allow for at least 2-3 treatments to determine if Botox is right for you  It may take a few weeks to see a response from treatment  No hair dye or scalp massage or treatment within 24 hours of treatment  We encourage you to use a headache diary or journal to document your headache frequency and severity  You can also utilize the Migraine Derek javi (downloadable on [employer])  Sign up for the Botox Savings Program  (commercial insurance patients may qualify) Sign up at [employer] customer insight or call [phone] Option: 4  To get more information on Botox therapy for Chronic Migraines and see frequently asked questions, please visit [employer]  If you have any questions or concerns, please speak to one of our Botox Coordinators at [phone]  We look forward to servicing you!

## 2023-03-28 NOTE — PROGRESS NOTES
PT Re-evaluation     Today's date: 3/28/2023   Patient name: Kevin Wisdom  : 1950  MRN: [id number]  Referring provider: Fernand Landau, PA-C  Dx:   Encounter Diagnosis     ICD-10-CM    1  Chronic bilateral low back pain without sciatica  M54 50     G89 29       2  Chronic pain of right knee  M25 561     G89 29                      Assessment/Plan  Patient is a 71 y o  female presenting to OP PT with LBP and R knee pain  They have attended 10 visits over 8 weeks  Since starting therapy pt has made the following progress towards goals:  1) Pain: Patient reports that their max pain has decreased to 6/10 from 10/10  3 )Strength: Patient's hip strength has improved by 1/2 grade    Kevin Wisdom has made improvements in objective data but remains limited in hip ROM, hip/knee strength, and functional endurance  Due to scheduling conflicts with PT, she would like to transition to the fitness program   It was mutually agreed to discharge to home exercise program at this time  Patient has good understanding of provided home exercise program and was instructed to call with any questions or if issues should arise  See updated goals below  Goals  STG (4 weeks)  Pain: Patient will subjectively report maximum pain decreased by 2/10- MET  Strength: Patient's will demonstrate LE strength improved by 1/2 grade- MET  Function: Patient increase score on FOTO (initial score: 34) by 10 points- progressing    LTG (8 weeks)  Pain: Patient will subjectively report less than 2/10 pain with functional activities  - progressing  Strength: Patient's will demonstrate LE strength improved to WNL- progressing  Function: Patient will increase score on FOTO to predicted value (47) or better- progressing   Patient will be ind with HEP by [address] [place] [address] [address] details: DC to home with HEP and fitness program  Patient would benefit from: fitness program,  home exercise program  Treatment plan discussed with: "patient    Subjective  UPDATE 3/28/2023: Patient reports that her knee and back are a little less painful, but they still bother her  Tried CSI for the back, which helped briefly  Thinks that her ability to walk for extended time has improved, states that she is now able to walk for 10 minutes before needing a break  Patient reports that she having trouble getting to her therapy sessions, and would like to transition to the step-down fitness program so that she can come exercise when she feel good  IE (1/19/2023): Patient is a 71 y o  female who presents for an initial outpatient physical therapy consultation regarding their LBP and knee pain  Patient has history of R TKA in June 2022, but wasn't able to drive so she was only able to home PT  She thinks that her legs are weak because she hasn't able to keep with up with her HEP  She states that she's not having much pain in the knee, but does note difficulty with getting up off the floor  Her back pain began in 2007 starting with her hips  For a long time advil \"took care of it\"  She received an injection in the back about 4 weeks ago, but doesn't think it helped  She notes difficulty with getting out of chair and walking for more than 5 minutes  She gets occasional pain down the back of her thigh but no lower than her knee  Pain  Best: back 0/10, knee 0/10  Worst: back 6/10 (was 10/10),  knee 2/10  Irritability: minutes to hours     Location: LBP, groin, posterior thigh, R knee  Quality: Achy, \"sore\",   Aggravating factors: walking, STS transfers, getting up off the floor, \"everything\"   Alleviating factors: Oxy, rest, laying down  Progression: not improving    Patient Goals for Therapy  \"decrease pain\"    Objective  Observation:  · Posture: fair in seated and in standing    Neurologic Screen  · Dermatomes: unremarkable  · Reflexes: unremarkable  · Myotomes: generally decreased strength    Functional movements  · Squat: unable  · Transitional movements: " "painful and slow    Joint play  Lumbar PA assessment: hypermobile  Thoracic PA assessment: hypomobile    Palpation  Paraspinals: TTP lower lumbar  Gluteals: R TTP      ROM    Lumbar  • Flexion  o ROM: WNL  o Repeated motions: no change   Patient noted LBP with coming back to neutral  • Extension  o ROM: mild decrease in ROM  o Repeated motions: no change     Left- PROM Right- PROM   Hip flexion WNL WNL   Hip extension 0 0   Hip IR 30 30   Hip ER 45 45   Hip ABD 45 45   Knee flexion WNL WNL   Knee extension WNL WNL        ROM Comments:    Strength     Left Right   Hip flexion 4+  4+ (was 4)   Hip extension 4+ (was 4 ) 4+ (was 4)   Hip ABD 4+ (was 4) 4+ (was 4)        Knee flexion 4+ 4+   Knee extension 4+ 4+   Strength comments:     Tests  · SIJ cluster :(-)  · Prone instability: (+)  · SLR: (-)  · X-SLR: (-)  · Slump: (+), mild discomfort  · RAUL: (-)  · FADIR: (-)           Precautions: n/a    Manuals 1/19 1/24 1/30 2/3 2/10 2/24 3/1 3/3 3/10  3/28   Lumbar STM                    re-eval   Hip and knee stretching     JF JF JF JF    JF JF JF                                                     Neuro Re-Ed                       Sciatic nerve glide HEP 2x10 2x10 2x10 2x10 held           clamshells HEP OTB  2x10 ea OTB  2x10 ea  5\" hold OTB  2x10 OTB  2x10 held Pink  2x10 Pink  2x10 Pink  2x10     LAQ HEP 2#  3x10 2#  3x10 2#  3x10 2 5#  3x10 held 2 5#  3x10 2 5#  3x10 2 5#  3x10     Ball push down       2x10  3\" hold 2x10 3\" hold held           Shoulder ext sitting on pball       OTB  3x10 OTB  3x10 held                                                           Ther Ex                       Bike   nv   5 min post session 5 min pre held   1 min  Did no trisha  3 min     LTR HEP 5x10\" 5x10\" 5x10\" 5x10\" held           STS   2x10 2x10 2x10 2x10 held 2x10 2x10 2x10     Hip 3 ways   2x10 ea 2x10 ea 2x10 ea 2x10 ea held 2x10ea 2x10 ea 2x10 ea     Mini squats   2x10 2x10 2x10 2x10 held 2x10 2x10 2x10     Step ups   6\"  2x10 " "6\"  2x10 6\"  2x10 6\"  2x10 held 6\"  2x10  6\"  2x10 6\"  2x10     Seated thoracic ext & rotation     10x10\" 10x10\" 10x10\" held           Open books     10x10\" 10x10\" 10x10\" held                                                           Ther Activity                                                                       Gait Training                                                                       Modalities                                               IE/HEP                                                                    re-eval  Fitness program intro        "

## 2023-03-28 NOTE — PROGRESS NOTES
Universal Protocol   Consent: Verbal consent obtained  Written consent obtained    Risks and benefits: risks, benefits and alternatives were discussed  Consent given by: patient  Patient understanding: patient states understanding of the procedure being performed  Patient consent: the patient's understanding of the procedure matches consent given  Procedure consent: procedure consent matches procedure scheduled        Chemodenervation     Date/Time 3/28/2023 7:44 AM     Performed by  Charlee Ross PA-C     Authorized by Charlee Ross PA-C        Pre-procedure details      Prepped With: Alcohol     Procedure details     Position:  Upright   Botox     Botox Type:  Type A    Brand:  Botox    mL's of Botulinum Toxin:  200    Final Concentration per CC:  100 units    Needle Gauge:  30 G 2 5 inch   Procedures     Botox Procedures: chronic headache      Indications: migraines     Injection Location      Head / Face:  L superior trapezius, R superior trapezius, L superior cervical paraspinal, R superior cervical paraspinal, L , R , procerus, L temporalis, R temporalis, R frontalis, L frontalis, R medial occipitalis and L medial occipitalis    L  injection amount:  5 unit(s)    R  injection amount:  5 unit(s)    L lateral frontalis:  5 unit(s)    R lateral frontalis:  5 unit(s)    L medial frontalis:  5 unit(s)    R medial frontalis:  5 unit(s)    L temporalis injection amount:  20 unit(s)    R temporalis injection amount:  20 unit(s)    Procerus injection amount:  5 unit(s)    L medial occipitalis injection amount:  15 unit(s)    R medial occipitalis injection amount:  15 unit(s)    L superior cervical paraspinal injection amount:  10 unit(s)    R superior cervical paraspinal injection amount:  10 unit(s)    L superior trapezius injection amount:  15 unit(s)    R superior trapezius injection amount:  15 unit(s)   Total Units     Total units used:  200    Total units discarded:  0 Post-procedure details      Chemodenervation:  Chronic migraine    Facial Nerve Location[de-identified]  Bilateral facial nerve    Patient tolerance of procedure: Tolerated well, no immediate complications   Comments         Extra medically necessary:  - R occipitalis- 10  - R traps- 10  -15 between the right and left suboccipital regions, lateral aspects  -10 right lateral orbicularis oculi       Patient requested caution around the right eye since she has a history of ptosis there  She requested alternative to Phenergan solution since this is difficult for her to carry around  I did send p o  Phenergan  The patient has a $15 co-pay for 30-day supply of Rox Travon but she would be willing to try it, did not start yet  Sumatriptan nasal spray works well      Blood pressure 141/63, pulse 71, temperature (!) 94 6 °F (34 8 °C), temperature source Temporal

## 2023-05-02 ENCOUNTER — TELEMEDICINE (OUTPATIENT)
Dept: NEUROLOGY | Facility: CLINIC | Age: 73
End: 2023-05-02

## 2023-05-02 ENCOUNTER — PATIENT MESSAGE (OUTPATIENT)
Dept: NEUROLOGY | Facility: CLINIC | Age: 73
End: 2023-05-02

## 2023-05-02 ENCOUNTER — APPOINTMENT (OUTPATIENT)
Dept: LAB | Facility: CLINIC | Age: 73
End: 2023-05-02

## 2023-05-02 VITALS — WEIGHT: 168 LBS | BODY MASS INDEX: 27.99 KG/M2 | HEIGHT: 65 IN

## 2023-05-02 DIAGNOSIS — G43.701 CHRONIC MIGRAINE WITHOUT AURA WITH STATUS MIGRAINOSUS, NOT INTRACTABLE: ICD-10-CM

## 2023-05-02 DIAGNOSIS — M54.12 CERVICAL RADICULOPATHY: ICD-10-CM

## 2023-05-02 DIAGNOSIS — G43.701 CHRONIC MIGRAINE WITHOUT AURA WITH STATUS MIGRAINOSUS, NOT INTRACTABLE: Primary | ICD-10-CM

## 2023-05-02 DIAGNOSIS — E53.8 B12 DEFICIENCY: ICD-10-CM

## 2023-05-02 DIAGNOSIS — M50.30 DEGENERATIVE DISC DISEASE, CERVICAL: ICD-10-CM

## 2023-05-02 DIAGNOSIS — F17.210 CIGARETTE SMOKER: ICD-10-CM

## 2023-05-02 DIAGNOSIS — Z01.818 PRE-OP EXAMINATION: ICD-10-CM

## 2023-05-02 LAB
BASOPHILS # BLD AUTO: 0.03 THOUSANDS/ΜL (ref 0–0.1)
BASOPHILS NFR BLD AUTO: 0 % (ref 0–1)
EOSINOPHIL # BLD AUTO: 0.11 THOUSAND/ΜL (ref 0–0.61)
EOSINOPHIL NFR BLD AUTO: 1 % (ref 0–6)
ERYTHROCYTE [DISTWIDTH] IN BLOOD BY AUTOMATED COUNT: 12.6 % (ref 11.6–15.1)
HCT VFR BLD AUTO: 41.9 % (ref 34.8–46.1)
HGB BLD-MCNC: 13.6 G/DL (ref 11.5–15.4)
IMM GRANULOCYTES # BLD AUTO: 0.07 THOUSAND/UL (ref 0–0.2)
IMM GRANULOCYTES NFR BLD AUTO: 1 % (ref 0–2)
LYMPHOCYTES # BLD AUTO: 2.92 THOUSANDS/ΜL (ref 0.6–4.47)
LYMPHOCYTES NFR BLD AUTO: 23 % (ref 14–44)
MCH RBC QN AUTO: 31.6 PG (ref 26.8–34.3)
MCHC RBC AUTO-ENTMCNC: 32.5 G/DL (ref 31.4–37.4)
MCV RBC AUTO: 97 FL (ref 82–98)
MONOCYTES # BLD AUTO: 0.73 THOUSAND/ΜL (ref 0.17–1.22)
MONOCYTES NFR BLD AUTO: 6 % (ref 4–12)
NEUTROPHILS # BLD AUTO: 8.74 THOUSANDS/ΜL (ref 1.85–7.62)
NEUTS SEG NFR BLD AUTO: 69 % (ref 43–75)
NRBC BLD AUTO-RTO: 0 /100 WBCS
PLATELET # BLD AUTO: 249 THOUSANDS/UL (ref 149–390)
PMV BLD AUTO: 10.4 FL (ref 8.9–12.7)
RBC # BLD AUTO: 4.3 MILLION/UL (ref 3.81–5.12)
WBC # BLD AUTO: 12.6 THOUSAND/UL (ref 4.31–10.16)

## 2023-05-02 RX ORDER — SYRINGE W-NEEDLE,DISPOSAB,3 ML 25GX5/8"
SYRINGE, EMPTY DISPOSABLE MISCELLANEOUS
Qty: 12 EACH | Refills: 2 | Status: SHIPPED | OUTPATIENT
Start: 2023-05-02

## 2023-05-02 RX ORDER — CYANOCOBALAMIN 1000 UG/ML
INJECTION, SOLUTION INTRAMUSCULAR; SUBCUTANEOUS
Qty: 3 ML | Refills: 4 | Status: SHIPPED | OUTPATIENT
Start: 2023-05-02

## 2023-05-02 RX ORDER — ATOGEPANT 10 MG/1
TABLET ORAL
Qty: 90 TABLET | Refills: 1 | Status: SHIPPED | OUTPATIENT
Start: 2023-05-02

## 2023-05-02 NOTE — PATIENT INSTRUCTIONS
"Headache management instructions  - When patient has a moderate to severe headache, they should seek rest, initiate relaxation and apply cold compresses to the head  - Maintain regular sleep schedule  Adults need at least 7-8 hours of uninterrupted a night  - Limit over the counter medications such as Tylenol, Ibuprofen, Aleve, Excedrin  (No more than 2- 3 times a week or max 10 a month)  - Maintain headache diary  Free MARIKA for a smart phone, which can be used is \"Migraine jose\"  - Limit caffeine to 1-2 cups 8 to 16 oz a day or less  - Avoid dietary trigger  (aged cheese, peanuts, MSG, aspartame and nitrates)  - Patient is to have regular frequent meals to prevent headache onset  - Please drink at least 64 ounces of water a day to help remain hydrated     "

## 2023-05-02 NOTE — PROGRESS NOTES
"  Virtual Regular Visit    Verification of patient location:    Patient is located at Home in the following state in which I hold an active license [place]      Assessment/Plan:    Problem List Items Addressed This Visit        Cardiovascular and Mediastinum    Chronic migraine without aura with status migrainosus, not intractable - Primary    Relevant Medications    Atogepant (Qulipta) 10 MG TABS    Syringe/Needle, Disp, (SYRINGE 3CC/25GX1\") 25G X 1\" 3 ML MISC       Nervous and Auditory    Cervical radiculopathy       Musculoskeletal and Integument    Degenerative disc disease, cervical       Other    B12 deficiency    Relevant Medications    cyanocobalamin 1,000 mcg/mL    Syringe/Needle, Disp, (SYRINGE 3CC/25GX1\") 25G X 1\" 3 ML MISC    Cigarette smoker     Migraines are stable on botox  Continue Botox injections every 3 months for migraine prevention  Since starting botox, the patient reports greater than 7 days of migraine relief from baseline, correlated with headache diary, decreased abortive medication use and decreased ER visits  Continue 600 mg gabapentin nightly  Continue Qulipta, but decrease dose as 30 mg seems to cause some mild nausea  Will cut the 30 mg tablet in half until the 10 mg is approved  I ordered the 10 mg Marden Stallion today  She should try the lower dose for 4 to 6 weeks, as long as there are no side effects, in order to notice a therapeutic benefit  As needed Maxalt or sumatriptan nasal spray work the best (she does not take these within the same day of each other)  She is very few nasal spray doses, only to be used sparingly  She adds Phenergan 12 5- 25 mg tablets every 6 hours as needed nausea or vomiting, Toradol injection if that fails  In the past we have also provided a prednisone taper to break a migraine cycle which helped  She uses Reyvow 100 mg out at nighttime to break the migraine headache cycle  Flexeril 10 mg every 12 hours for neck pain and tension      Continue " B12 injections monthly  Refilled today  The patient should not hesitate to call me prior to her follow up with any questions or concerns  Reason for visit is No chief complaint on file  Encounter provider Stevie Rojas PA-C    Provider located at [address] [address]      Recent Visits  No visits were found meeting these conditions  Showing recent visits within past 7 days and meeting all other requirements  Today's Visits  Date Type Provider Dept   05/02/23 Telemedicine Arnulfo Botello PA-C [facility] [place]   Showing today's visits and meeting all other requirements  Future Appointments  No visits were found meeting these conditions  Showing future appointments within next 150 days and meeting all other requirements       The patient was identified by name and date of birth  Rhoda Hinson was informed that this is a telemedicine visit and that the visit is being conducted through the [employer] [employer]  She agrees to proceed     My office door was closed  No one else was in the room  She acknowledged consent and understanding of privacy and security of the video platform  The patient has agreed to participate and understands they can discontinue the visit at any time  Patient is aware this is a billable service  Subjective  Rhoda Hinson is a 71 y o  female who is contacted via telemedicine for neurological follow-up  HPI       Following with PM for low back pain-recent right-sided facet rhizotomy 4/27/2023 in the low back  S/p C4-7 ACDF 10/24/2022  Unfortunately the patient stepped on a broken piece of glass that she could not see on her carpet  She accidentally broke a piece of glass 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 of her art work  She states she did not have a vacuum , it was broken  She stepped on the glass and it went into her right heel and it is near the bone per her history    She states she went to see the doctor and he could not remove it now she is scheduled for surgery 5/8/2023 for foreign body removal   She denies any infectious symptoms, states it does not look red or indurated  Not bleeding  It is painful, so she steps on the balls of her feet so that she does not have to put pressure on the heel  She is tolerating the pain, it is not excessive  She states if it gets too painful she will go to the emergency room or call her doctor  Her migraine headaches are better managed now that she had a repeat Botox injection this past March, it was on 3/28/2023  She still gets frequent migraine headaches and we added Mike García for this as a prevention method at the last visit  She states after a few days of taking it at 30 mg she was getting a bit nauseous so she stopped it  She would be agreeable to taking a half dose of that or decreasing the dose  She did take it with food and it still made her slightly nauseous  She continues to have specific triggers, including rainy days  It feels like her eyes are going to pop out when it is a rainy day or when the weather changes  She also notes cataracts bilaterally and she states she needs to get them removed, she thinks this contributes to migraines  Otherwise no changes in character to her migraine headaches  She uses either Maxalt or sumatriptan nasal spray and she is happy with the effectiveness of either of these  Sumatriptan nasal spray sometimes causes a bad taste in the mouth so she chews gum and this helps  She does not take these to triptans together  She does not typically use Ravenelle but has it on hand if needed, this does work too  Prior note:   She reports 2 migraine headaches per week  She typically is triggered by stress, and sometimes physical therapy triggers her headaches    When she works on weekends it increases her stress and headaches      She reports that migraine headaches have made a slight increase in frequency since she had a cervical fusion status post 10/24/2022  Right arm and shoulder pain significantly improved after the surgery thankfully  She states her activities of stenciling which she loves to do sometimes makes her R shoulder act up  She reports that Maxalt ODT works well to alleviate the migraine within about 20 minutes, and sometimes she needs to take a second one after 2 hours  Toradol injection does not work well  Ativan does help with neck tension and myofascial pain  She took a half tab of reyvow once to try it out and it did reduce the migraine but the patient does not report sedation or any other side effects to this      She has a tendency to get nausea and sick with a migraine and is asking for an alternative nausea medication other than Compazine and Zofran  Past Medical History:   Diagnosis Date    Allergic rhinitis     Anxiety     Arthritis     Cancer (HCC)     Skin     Chronic pain disorder     knee and back    Chronic prescription opiate use     Depression     GERD (gastroesophageal reflux disease)     Hypertension     Irritable bowel syndrome     Migraines     Spinal stenosis        Past Surgical History:   Procedure Laterality Date    CERVICAL FUSION      CHOLECYSTECTOMY      COLONOSCOPY      IL BLEPHAROPLASTY UPPER EYELID W/EXCESSIVE SKIN Bilateral 01/29/2020    Procedure: BLEPHAROPLASTY UPPER;  Surgeon: Kimberly Corea MD;  Location: [facility] MAIN OR;  Service: Plastics    REPLACEMENT TOTAL KNEE Right     ROTATOR CUFF REPAIR Bilateral        Current Outpatient Medications   Medication Sig Dispense Refill    ACETAMINOPHEN-BUTALBITAL  MG TABS 1 tab at migraine onset, repeat after 4 hours if needed  Max 2 per day and 3 per week   20 tablet 2    Atogepant (Qulipta) 10 MG TABS 1 tab qD 90 tablet 1    azelastine (ASTELIN) 0 1 % nasal spray 2 sprays into each nostril 2 (two) times a day Use in each nostril as directed      buPROPion (WELLBUTRIN XL) 300 mg "24 hr tablet Take 1 tablet (300 mg total) by mouth daily 30 tablet 5    cetirizine (ZyrTEC) 10 mg tablet Take 10 mg by mouth daily      cyanocobalamin 1,000 mcg/mL Inject once per month  3 mL 4    cyclobenzaprine (FLEXERIL) 10 mg tablet TAKE 1 TABLET BY MOUTH EVERY 12 HOURS AS NEEDED FOR MODERATE TO SEVERE NECK PAIN/ MUSCLE SPASM 30 tablet 2    gabapentin (NEURONTIN) 300 mg capsule TAKE 2 CAPSULES BY MOUTH qhs 180 capsule 2    ketorolac (TORADOL) 30 mg/mL injection 1 mL or 30 mg/mL IM injection p r n  migraine, repeat after 6 hours if needed  Max two injections a week 4 mL 6    lasmiditan succinate (REYVOW) 100 mg tablet Take 1/2- 1 tab qhs prn migraine  Caution sedation  Do not use more than one dose per 24 hours, or more than four doses per month  4 tablet 6    LORazepam (ATIVAN) 2 mg tablet Take 2 mg by mouth 3 (three) times a day as needed      losartan (COZAAR) 50 mg tablet Take 50 mg by mouth daily      oxyCODONE (OxyCONTIN) 10 mg 12 hr tablet Take 15 mg by mouth 2 (two) times a day       promethazine (PHENERGAN) 12 5 MG tablet Take 1-2 tablets (12 5-25 mg total) by mouth every 6 (six) hours as needed for nausea or vomiting 30 tablet 0    rizatriptan (MAXALT-MLT) 10 mg disintegrating tablet Take at the onset of migraine; if symptoms continue or return, may take another dose at least 2 hours after first dose  Take no more than 2 doses in a day  12 tablet 3    rOPINIRole (REQUIP) 1 mg tablet Take 4 mg by mouth daily       sertraline (ZOLOFT) 50 mg tablet TAKE 1 TABLET BY MOUTH EVERY DAY 90 tablet 1    SUMAtriptan (IMITREX) 20 MG/ACT nasal spray 1 spray in one nostril at headache onset, repeat after 2 hours if needed  No more than 2 doses per day  Do not take maxalt with this   4 each 2    Syringe/Needle, Disp, (SYRINGE 3CC/25GX1\") 25G X 1\" 3 ML MISC Use syringes for B12 and/or toradol injections 12 each 2    dicyclomine (BENTYL) 10 mg capsule Take 1 capsule (10 mg total) by mouth 3 (three) times " "a day as needed (stomach pain) PRN (Patient not taking: Reported on 5/2/2023) 90 capsule 0     Current Facility-Administered Medications   Medication Dose Route Frequency Provider Last Rate Last Admin    prochlorperazine (COMPAZINE) injection 5 mg  5 mg Intramuscular Q4H PRN Syed Gan MD            Allergies   Allergen Reactions    Metaxalone Hives and Rash     Reaction Date: [id number]; Category: Allergy;     Aspirin Other (See Comments)    Codeine Itching    Morphine GI Intolerance, Nausea Only and Other (See Comments)     \"my stomach goes on fire\"      Other Itching     Category: Adverse Reaction;     Eggs Or Egg-Derived Products - Food Allergy Palpitations    Naproxen Palpitations    Tapentadol Other (See Comments)     tachycardia       Review of Systems   Constitutional: Negative  Negative for appetite change and fever  HENT: Negative  Negative for hearing loss, tinnitus, trouble swallowing and voice change  Eyes: Negative  Negative for photophobia, pain and visual disturbance  Respiratory: Negative  Negative for shortness of breath  Cardiovascular: Negative  Negative for palpitations  Gastrointestinal: Negative  Negative for nausea and vomiting  Endocrine: Negative  Negative for cold intolerance  Genitourinary: Negative  Negative for dysuria, frequency and urgency  Musculoskeletal: Negative  Negative for gait problem, myalgias and neck pain  Skin: Negative  Negative for rash  Allergic/Immunologic: Negative  Neurological: Positive for headaches  Negative for dizziness, tremors, seizures, syncope, facial asymmetry, speech difficulty, weakness, light-headedness and numbness  Hematological: Negative  Does not bruise/bleed easily  Psychiatric/Behavioral: Negative  Negative for confusion, hallucinations and sleep disturbance       The following portions of the patient's history were reviewed and updated as appropriate: allergies, current medications/ medication " "history, past family history, past medical history, past social history, past surgical history and problem list     Review of systems was reviewed and otherwise unremarkable from a neurological perspective  Video Exam    Vitals:    05/02/23 0901   Weight: 76 2 kg (168 lb)   Height: 5' 5\" (1 651 m)   Body mass index is 27 96 kg/m²  Physical Exam   Neurological exam:  On neurologic exam, the patient is alert and oriented to time and place  Speech is fluent and articulate, and the patient follows commands appropriately  Judgment and affect appear normal   Extraocular muscles are intact without nystagmus  Face is symmetric  Hearing is intact bilaterally  Motor examination reveals adequate range of motion  she can arise from a chair without difficulty  Visit Time  Total Visit Duration: 15  I have spent a total time of 20 minutes on 05/02/23 in caring for this patient including Risks and benefits of tx options, Instructions for management, Counseling / Coordination of care, Documenting in the medical record, Reviewing / ordering tests, medicine, procedures   and Obtaining or reviewing history            "

## 2023-05-03 LAB
ALBUMIN SERPL BCP-MCNC: 3.6 G/DL (ref 3.5–5)
ALP SERPL-CCNC: 92 U/L (ref 46–116)
ALT SERPL W P-5'-P-CCNC: 21 U/L (ref 12–78)
ANION GAP SERPL CALCULATED.3IONS-SCNC: 4 MMOL/L (ref 4–13)
AST SERPL W P-5'-P-CCNC: 8 U/L (ref 5–45)
BILIRUB SERPL-MCNC: 0.35 MG/DL (ref 0.2–1)
BUN SERPL-MCNC: 26 MG/DL (ref 5–25)
CALCIUM SERPL-MCNC: 9.1 MG/DL (ref 8.3–10.1)
CHLORIDE SERPL-SCNC: 108 MMOL/L (ref 96–108)
CO2 SERPL-SCNC: 24 MMOL/L (ref 21–32)
CREAT SERPL-MCNC: 1.18 MG/DL (ref 0.6–1.3)
GFR SERPL CREATININE-BSD FRML MDRD: 46 ML/MIN/1.73SQ M
GLUCOSE SERPL-MCNC: 95 MG/DL (ref 65–140)
POTASSIUM SERPL-SCNC: 4.5 MMOL/L (ref 3.5–5.3)
PROT SERPL-MCNC: 7.1 G/DL (ref 6.4–8.4)
SODIUM SERPL-SCNC: 136 MMOL/L (ref 135–147)

## 2023-05-05 RX ORDER — PROMETHAZINE HYDROCHLORIDE 12.5 MG/1
12.5-25 TABLET ORAL EVERY 6 HOURS PRN
Qty: 30 TABLET | Refills: 0 | Status: SHIPPED | OUTPATIENT
Start: 2023-05-05

## 2023-05-05 NOTE — PATIENT COMMUNICATION
Refill entered as previously ordered, 1-2 tabs every 6 hrs prn      Please sign off if agreeable or change if necessary

## 2023-06-15 ENCOUNTER — PATIENT MESSAGE (OUTPATIENT)
Dept: NEUROLOGY | Facility: CLINIC | Age: 73
End: 2023-06-15

## 2023-06-28 ENCOUNTER — PROCEDURE VISIT (OUTPATIENT)
Dept: NEUROLOGY | Facility: CLINIC | Age: 73
End: 2023-06-28
Payer: MEDICARE

## 2023-06-28 VITALS
TEMPERATURE: 97.1 F | BODY MASS INDEX: 27.99 KG/M2 | HEART RATE: 99 BPM | SYSTOLIC BLOOD PRESSURE: 122 MMHG | WEIGHT: 168 LBS | DIASTOLIC BLOOD PRESSURE: 65 MMHG | HEIGHT: 65 IN

## 2023-06-28 DIAGNOSIS — F51.01 PRIMARY INSOMNIA: ICD-10-CM

## 2023-06-28 DIAGNOSIS — G43.701 CHRONIC MIGRAINE WITHOUT AURA WITH STATUS MIGRAINOSUS, NOT INTRACTABLE: ICD-10-CM

## 2023-06-28 DIAGNOSIS — G43.719 INTRACTABLE CHRONIC MIGRAINE WITHOUT AURA AND WITHOUT STATUS MIGRAINOSUS: Primary | ICD-10-CM

## 2023-06-28 RX ORDER — PROMETHAZINE HYDROCHLORIDE 25 MG/1
25 TABLET ORAL EVERY 6 HOURS PRN
Qty: 90 TABLET | Refills: 0 | Status: SHIPPED | OUTPATIENT
Start: 2023-06-28

## 2023-06-28 RX ORDER — GABAPENTIN 300 MG/1
CAPSULE ORAL
Qty: 180 CAPSULE | Refills: 2 | Status: SHIPPED | OUTPATIENT
Start: 2023-06-28

## 2023-06-28 NOTE — PROGRESS NOTES
Universal Protocol   Consent: Verbal consent obtained  Written consent obtained    Risks and benefits: risks, benefits and alternatives were discussed  Consent given by: patient  Patient understanding: patient states understanding of the procedure being performed  Patient consent: the patient's understanding of the procedure matches consent given  Procedure consent: procedure consent matches procedure scheduled        Chemodenervation     Date/Time 6/28/2023 2:30 PM     Performed by  Vicente Velasco PA-C   Authorized by Vicente Velasco PA-C       Pre-procedure details      Prepped With: Alcohol     Procedure details     Position:  Upright   Botox     Botox Type:  Type A    Brand:  Botox    mL's of Botulinum Toxin:  200    Final Concentration per CC:  100 units    Needle Gauge:  30 G 2 5 inch   Procedures     Botox Procedures: chronic headache      Indications: migraines     Injection Location      Head / Face:  L superior trapezius, R superior trapezius, L superior cervical paraspinal, R superior cervical paraspinal, L , R , procerus, L temporalis, R temporalis, R frontalis, L frontalis, R medial occipitalis and L medial occipitalis    L  injection amount:  5 unit(s)    R  injection amount:  5 unit(s)    L lateral frontalis:  5 unit(s)    R lateral frontalis:  5 unit(s)    L medial frontalis:  5 unit(s)    R medial frontalis:  5 unit(s)    L temporalis injection amount:  20 unit(s)    R temporalis injection amount:  20 unit(s)    Procerus injection amount:  5 unit(s)    L medial occipitalis injection amount:  15 unit(s)    R medial occipitalis injection amount:  15 unit(s)    L superior cervical paraspinal injection amount:  10 unit(s)    R superior cervical paraspinal injection amount:  10 unit(s)    L superior trapezius injection amount:  15 unit(s)    R superior trapezius injection amount:  15 unit(s)   Total Units     Total units used:  200    Total units discarded:  0 "  Post-procedure details      Chemodenervation:  Chronic migraine    Facial Nerve Location[de-identified]  Bilateral facial nerve    Patient tolerance of procedure: Tolerated well, no immediate complications   Comments      Extra medically necessary:  - 20 R occipitalis (lateral)  - 15 between the right and left suboccipital regions, lateral aspects  - 10 right lateral orbicularis oculi       Blood pressure 122/65, pulse 99, temperature (!) 97 1 °F (36 2 °C), temperature source Temporal, height 5' 5\" (1 651 m), weight 76 2 kg (168 lb)  Pt agreed to hold Nick Alexander, states she has s/e to higher dose and lower dose ineffective  Agreeable to Vyepti, I messaged clinical team to initiate auth if possible  S/e reviewed with pt      "

## 2023-06-29 NOTE — PATIENT INSTRUCTIONS
Botox Therapy  Important Information    Our goal is to make sure you fully understand how Botox Therapy treatment may benefit you and to help you understand how you can play an active role in your treatments and ongoing care  Please review the following information below  Call our office IMMEDIATELY @ [phone] and speak to one of our Botox Coordinators if you have a change in insurance  (a prior authorization is required and accurate information is vital)  Please call at least 24 hours in advance if you can't make your appointment  Appointments are scheduled every 91 days (this will be scheduled in advance before leaving the office)  You must allow for at least 2-3 treatments to determine if Botox is right for you  It may take a few weeks to see a response from treatment  No hair dye or scalp massage or treatment within 24 hours of treatment  We encourage you to use a headache diary or journal to document your headache frequency and severity  You can also utilize the Migraine Derek marika (downloadable on [employer])  Sign up for the Botox Savings Program  (commercial insurance patients may qualify) Sign up at [employer] or call [phone] Option: 4  To get more information on Botox therapy for Chronic Migraines and see frequently asked questions, please visit [employer]  If you have any questions or concerns, please speak to one of our Botox Coordinators at [phone]  We look forward to servicing you! Headache management instructions  - When patient has a moderate to severe headache, they should seek rest, initiate relaxation and apply cold compresses to the head  - Maintain regular sleep schedule  Adults need at least 7-8 hours of uninterrupted a night  - Limit over the counter medications such as Tylenol, Ibuprofen, Aleve, Excedrin  (No more than 2- 3 times a week or max 10 a month)  - Maintain headache diary    Free MARIKA for a smart phone, which can be "used is \"Migraine jose\"  - Limit caffeine to 1-2 cups 8 to 16 oz a day or less  - Avoid dietary trigger  (aged cheese, peanuts, MSG, aspartame and nitrates)  - Patient is to have regular frequent meals to prevent headache onset  - Please drink at least 64 ounces of water a day to help remain hydrated     "

## 2023-07-05 DIAGNOSIS — G43.701 CHRONIC MIGRAINE WITHOUT AURA WITH STATUS MIGRAINOSUS, NOT INTRACTABLE: ICD-10-CM

## 2023-07-05 RX ORDER — SUMATRIPTAN 20 MG/1
SPRAY NASAL
Qty: 4 EACH | Refills: 0 | Status: SHIPPED | OUTPATIENT
Start: 2023-07-05

## 2023-07-14 ENCOUNTER — PATIENT MESSAGE (OUTPATIENT)
Dept: NEUROLOGY | Facility: CLINIC | Age: 73
End: 2023-07-14

## 2023-07-17 ENCOUNTER — TELEPHONE (OUTPATIENT)
Dept: NEUROLOGY | Facility: CLINIC | Age: 73
End: 2023-07-17

## 2023-07-17 DIAGNOSIS — G43.701 CHRONIC MIGRAINE WITHOUT AURA WITH STATUS MIGRAINOSUS, NOT INTRACTABLE: Primary | ICD-10-CM

## 2023-07-17 NOTE — TELEPHONE ENCOUNTER
Sorry! Yes, she says the Nurtec does not work and wants another medication as a backup to her rizatriptan as an abortive medication  She asked specifically for another medication like rizatriptan but I advised her mixing triptans is not typically advised    Called patient to offer alternatives listed below, no response   Left message for return call to office English

## 2023-07-17 NOTE — TELEPHONE ENCOUNTER
July 14, 2023  Sudhir Bauman  to [facility] (supporting [facility] [place])          7/14/23  8:25 PM  when I saw you last, we discussed preventative issues. Have any of those been approved? Where do we stand on that issue? Thank you very much.

## 2023-07-17 NOTE — TELEPHONE ENCOUNTER
Pt has medicare as primary, [place] not required for medicare,    Pt has secondary insurance through [employer] Per [employer]-  Authorization not required. Other insurer is primary. [employer] does not require preauthorization for the service requested based on Product, Group, Benefit, Diagnosis, Place of Service or Prior Auth requirements.

## 2023-07-17 NOTE — TELEPHONE ENCOUNTER
----- Message from Clemencia Marquez PA-C sent at 6/28/2023  4:14 PM EDT -----  Regarding: vyepti  Pt would like to start Vyepti infusions. She requested [facility] infusion but I do not think we can do that, so I think the closest is [facility] for her. I will complete a Vyepti form. Thanks.

## 2023-07-18 RX ORDER — SODIUM CHLORIDE 9 MG/ML
20 INJECTION, SOLUTION INTRAVENOUS ONCE
Status: CANCELLED | OUTPATIENT
Start: 2023-07-27

## 2023-07-21 NOTE — TELEPHONE ENCOUNTER
Called pt. Agreeable to [facility] [place]. If scheduled on a Friday would prefer after 1pm-  Other days after 10am  Aug 2-not available    [phone] to leave detailed message    Called [facility] center and scheduled pt for 7/27 at [address] pt and left detailed message in regards to appt date and time.   Also gave infusion center address and phone number if needed

## 2023-07-25 ENCOUNTER — OFFICE VISIT (OUTPATIENT)
Dept: NEUROLOGY | Facility: CLINIC | Age: 73
End: 2023-07-25
Payer: MEDICARE

## 2023-07-25 VITALS
HEART RATE: 71 BPM | BODY MASS INDEX: 28.66 KG/M2 | HEIGHT: 65 IN | SYSTOLIC BLOOD PRESSURE: 116 MMHG | DIASTOLIC BLOOD PRESSURE: 64 MMHG | WEIGHT: 172 LBS

## 2023-07-25 DIAGNOSIS — R94.4 DECREASED GFR: ICD-10-CM

## 2023-07-25 DIAGNOSIS — Z98.1 S/P CERVICAL SPINAL FUSION: ICD-10-CM

## 2023-07-25 DIAGNOSIS — E53.8 B12 DEFICIENCY: ICD-10-CM

## 2023-07-25 DIAGNOSIS — M50.30 DEGENERATIVE DISC DISEASE, CERVICAL: ICD-10-CM

## 2023-07-25 DIAGNOSIS — G43.701 CHRONIC MIGRAINE WITHOUT AURA WITH STATUS MIGRAINOSUS, NOT INTRACTABLE: Primary | ICD-10-CM

## 2023-07-25 PROCEDURE — 99215 OFFICE O/P EST HI 40 MIN: CPT | Performed by: PHYSICIAN ASSISTANT

## 2023-07-25 PROCEDURE — 96372 THER/PROPH/DIAG INJ SC/IM: CPT | Performed by: PHYSICIAN ASSISTANT

## 2023-07-25 RX ORDER — KETOROLAC TROMETHAMINE 30 MG/ML
30 INJECTION, SOLUTION INTRAMUSCULAR; INTRAVENOUS ONCE
Status: COMPLETED | OUTPATIENT
Start: 2023-07-25 | End: 2023-07-25

## 2023-07-25 RX ORDER — CYANOCOBALAMIN 1000 UG/ML
INJECTION, SOLUTION INTRAMUSCULAR; SUBCUTANEOUS
Qty: 3 ML | Refills: 4 | Status: SHIPPED | OUTPATIENT
Start: 2023-07-25

## 2023-07-25 RX ORDER — SYRINGE W-NEEDLE,DISPOSAB,3 ML 25GX5/8"
SYRINGE, EMPTY DISPOSABLE MISCELLANEOUS
Qty: 12 EACH | Refills: 2 | Status: SHIPPED | OUTPATIENT
Start: 2023-07-25

## 2023-07-25 RX ADMIN — KETOROLAC TROMETHAMINE 30 MG: 30 INJECTION, SOLUTION INTRAMUSCULAR; INTRAVENOUS at 10:33

## 2023-07-25 NOTE — PATIENT INSTRUCTIONS
Biofreeze  Aspercream  Peppermint oil/ menthol    Magnesium 250-500 mg daily  B2- 200-400 mg daily      Headache management instructions  - When patient has a moderate to severe headache, they should seek rest, initiate relaxation and apply cold compresses to the head. - Maintain regular sleep schedule. Adults need at least 7-8 hours of uninterrupted a night. - Limit over the counter medications such as Tylenol, Ibuprofen, Aleve, Excedrin. (No more than 2- 3 times a week or max 10 a month). - Maintain headache diary. Free MARIKA for a smart phone, which can be used is "Migraine jose"  - Limit caffeine to 1-2 cups 8 to 16 oz a day or less. - Avoid dietary trigger. (aged cheese, peanuts, MSG, aspartame and nitrates). - Patient is to have regular frequent meals to prevent headache onset. - Please drink at least 64 ounces of water a day to help remain hydrated.

## 2023-07-25 NOTE — PROGRESS NOTES
Just wondering how you are doing dictating patient ID: Kim Abrams is a 71 y.o. female. Assessment/Plan:       Diagnoses and all orders for this visit:    Chronic migraine without aura with status migrainosus, not intractable  -     Ubrogepant (UBRELVY) 100 MG tablet; 1 tabs at migraine onset, repeat in 2 hours if needed. Max 2 per day. -     ketorolac (TORADOL) injection 30 mg  -     Basic metabolic panel; Future  -     Syringe/Needle, Disp, (SYRINGE 3CC/25GX1") 25G X 1" 3 ML MISC; Use syringes for B12 and/or toradol injections    B12 deficiency  -     cyanocobalamin 1,000 mcg/mL; Inject once per month. -     Syringe/Needle, Disp, (SYRINGE 3CC/25GX1") 25G X 1" 3 ML MISC; Use syringes for B12 and/or toradol injections    Degenerative disc disease, cervical    S/P cervical spinal fusion    Decreased GFR  -     Basic metabolic panel; Future         Vyepti infusion is scheduled for this Thursday, all questions and concerns about the infusion were answered today, side effects reviewed including potential allergy, congestion/rhinorrhea. The patient was encouraged to continue at least 2-3 consecutive infusions to see if the medication is therapeutic. Continue Botox injections every 90 days approximately. Since starting botox, the patient reports greater than 7 days of migraine relief from baseline, correlated with headache diary, decreased abortive medication use and decreased ER visits. Continue gabapentin 600 mg nightly for prevention of migraine, cannot tolerate daytime dose. As needed migraine onset:  --She uses Maxalt or Ubrelvy. The patient does not use these together within a 24-hour period. --Not sure if Francois Rios is covered, she states there has been a change in the insurance and hopefully prior Toula Bryce will be approved. --Add promethazine 25 mg every 6 hours as needed nausea with the migraine. Add Toradol injection sparingly if above fails. -- Reyvow to break a migraine cycle, at bedtime. Cautioned about sedation, not to drive after taking.  -- also discussed conservative/nonmedication methods such as Biofreeze, Aspercreme on neck, etc., when she gets a part-time job she will hopefully be able to restart massage therapy which worked before. Current 7/10 migraine headache Toradol 30 mg injection today in the left deltoid, patient tolerated well with no immediate complications. Repeat BMP ordered to recheck kidney function, and the patient was encouraged to continue to hydrate with at least 60 ounces of water daily or a little more than that if tolerated. The patient avoids all other NSAIDs due to GI distress. Continue B12 injections at home monthly. She has no problem with administering these to her self. The patient should not hesitate to call me prior to her follow up with any questions or concerns. Subjective:    HPI      Ms. Juan Ramon Burgos is a very pleasant 80-year-old female who is here for neurological follow-up for migraine headaches. Now following with [facility] pain management status post bilateral L4-5 L5-S1 MBB #1 1/3/1723. Recent right-sided facet rhizotomy 4/27/2023 in the low back. S/p C4-7 ACDF 10/24/2022 (R arm and shoulder pain significantly improved after the surgery thankfully. She states her activities of stenciling which she loves to do sometimes makes her R shoulder act up). 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 The patient's client does not use the air conditioning during the day since it is too expensive. The patient states that she started working during the evening/night hours and her migraine headaches significantly subsided because she is not exposed to significant heat. Her client has a small air conditioner in her room and sleeps in their, the [facility] is only turned on at night.     She states the medications for her migraines are helping, but medications are only temporary or only worked partially when she is exposed to a lot of heat. She had about 15 migraine headaches this month so far which is unusual, but definitely triggered by the hot weather, and improved last week when she switched to night shift. Migraines  Onset: Began early 25s    Current pain: 7/10-given 30 mg Toradol for this. Reaches pain level at worst: 10 out of 10    Frequency: 15 per month  Duration: A couple days depending on the medication she takes and/or triggers  Location: Ocular pain bilaterally right >left, which progresses to generalized pain throughout the head/holocranial and radiating to the occipital region  Quality: Sharp, knifelike, tension  Sometimes alleviated with ice packs, ties a scarf on head, used to use Aspercreme on neck  Associated with: nausea, photophobia, phonophobia, sometimes blurry vision, no autonomic features, stiff/sore neck, sometimes congestion, sometimes dizziness    Triggers:   -- Sunlight, wears sunglasses around the house sometimes  --Hot weather  --Stress  --Neck pain    Aura/ warning: none    Medications tried:  Prevention-  Gabapentin- can only take qhs  Tizanidine  Emgality  Verapamil  Duloxetine  Baclofen  Qulipta-side effects  Lexapro  Doxepin  Bisoprolol    Abortive-  Rizatriptan  Fioricet  Narcotics-oxycodone  Toradol injection  Toradol p.o. Ativan  Prednisone taper  Promethazine  Olanzapine  Compazine-worsened restless leg symptoms  Zofran    Other non-medication therapies or treatments-  -- Trigger point injections were helpful in the past  --Botox  -- [facility] chiropractic care for neck pain-helpful  -- Medical marijuana  --Massage helpful but expensive  -- Acupuncture not helpful    She used to see Dr. José Miguel Aguilera at [facility], 10/9/2020    Follows with a psychiatrist at [facility].     Sleep concerns:  --  She states that she has poor sleeping patterns, sleeping 3-4 hours a day.  Patient has problems falling asleep and staying asleep. Family hx of migraines:  Family hx of cerebral aneurysms:    Lifestyle: In terms of her lifestyle patient lives alone.  She drinks only decaf coffee. She does smoke, she drinks occasionally and denies use of illicit drugs. Diagnostics:  Brain MRI with and without contrast 8/13/2020: "No mass or abnormal contrast enhancement. Mild degree of FLAIR hyperintensity in cerebral white matter and in the yolanda. Chronic small vessel ischemic disease is the most likely etiology. Other possible etiologies include gliosis and demyelination."    Cervical spine MRI without contrast 1/24/2022: "Progression of multilevel degenerative change in the cervical spine. Disc osteophyte complex causes mild spinal cord impingement at C4-C5. Disc osteophyte complex at C5-C6 mildly abuts the ventral spinal cord. There is no abnormal cord signal identified. Neural foraminal narrowing at multiple levels as detailed above."    Most recent labs 5-23 with a BUN of 26, GFR 46, otherwise unremarkable CMP, CBC and differential with white blood cells 12.6, absolute neutrophils 8.74. Prior documentation:  Unfortunately the patient stepped on a broken piece of glass that she could not see on her carpet. She accidentally broke a piece of glass 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 of her art work. She states she did not have a vacuum , it was broken. She stepped on the glass and it went into her right heel and it is near the bone per her history. She states she went to see the doctor and he could not remove it now she is scheduled for surgery 5/8/2023 for foreign body removal.  She denies any infectious symptoms, states it does not look red or indurated. Not bleeding. It is painful, so she steps on the balls of her feet so that she does not have to put pressure on the heel. She is tolerating the pain, it is not excessive.   She states if it gets too painful she will go to the emergency room or call her doctor.     Her migraine headaches are better managed now that she had a repeat Botox injection this past March, it was on 3/28/2023. She still gets frequent migraine headaches and we added Ava Wade for this as a prevention method at the last visit. She states after a few days of taking it at 30 mg she was getting a bit nauseous so she stopped it. She would be agreeable to taking a half dose of that or decreasing the dose. She did take it with food and it still made her slightly nauseous.     She continues to have specific triggers, including rainy days. It feels like her eyes are going to pop out when it is a rainy day or when the weather changes. She also notes cataracts bilaterally and she states she needs to get them removed, she thinks this contributes to migraines.     Otherwise no changes in character to her migraine headaches. She uses either Maxalt or sumatriptan nasal spray and she is happy with the effectiveness of either of these. Sumatriptan nasal spray sometimes causes a bad taste in the mouth so she chews gum and this helps. She does not take these to triptans together. She does not typically use Ravenelle but has it on hand if needed, this does work too.     Prior note:   She reports 2 migraine headaches per week  She typically is triggered by stress, and sometimes physical therapy triggers her headaches.  When she works on weekends it increases her stress and headaches.     She reports that migraine headaches have made a slight increase in frequency since she had a cervical fusion status post 10/24/2022.  Right arm and shoulder pain significantly improved after the surgery thankfully.  She states her activities of stenciling which she loves to do sometimes makes her R shoulder act up.     She reports that Maxalt ODT works well to alleviate the migraine within about 20 minutes, and sometimes she needs to take a second one after 2 hours. Toradol injection does not work well.   Ativan does help with neck tension and myofascial pain. She took a half tab of reyvow once to try it out and it did reduce the migraine but the patient does not report sedation or any other side effects to this.     She has a tendency to get nausea and sick with a migraine and is asking for an alternative nausea medication other than Compazine and Zofran. The following portions of the patient's history were reviewed and updated as appropriate:   She  has a past medical history of Allergic rhinitis, Anxiety, Arthritis, Cancer ([address]), Chronic pain disorder, Chronic prescription opiate use, Depression, GERD (gastroesophageal reflux disease), Hypertension, Irritable bowel syndrome, Migraines, and Spinal stenosis. She   Patient Active Problem List    Diagnosis Date Noted   • S/P cervical spinal fusion 07/25/2023   • Decreased GFR 07/25/2023   • Chronic migraine without aura with status migrainosus, not intractable 05/24/2022   • Cervical radiculopathy    • Carpal tunnel syndrome on right    • Foraminal stenosis of cervical region 10/03/2021   • Degenerative disc disease, cervical 10/03/2021   • Cigarette smoker 10/03/2021   • Hypertension 10/03/2021   • B12 deficiency 11/05/2020   • Restless leg syndrome 11/05/2020   • Iron deficiency 11/05/2020   • Myofascial muscle pain 09/25/2020   • Myofascial pain 08/10/2020   • Intractable chronic migraine without aura and without status migrainosus 08/04/2020   • Intractable migraine without aura 07/30/2020   • 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   • Primary insomnia 05/07/2019   • Insomnia due to other mental disorder 04/09/2019   • Anxiety and depression 01/10/2019   • Recurrent major depressive disorder, in partial remission ([address]) 04/25/2018     She  has a past surgical history that includes Colonoscopy; Cholecystectomy; Rotator cuff repair (Bilateral); pr blepharoplasty upper eyelid w/excessive skin (Bilateral, 01/29/2020);  Replacement total knee (Right); and Cervical fusion. Her family history includes Alzheimer's disease in her father; Breast cancer in her mother; Depression in her sister. She  reports that she has been smoking cigarettes. She has been smoking an average of .5 packs per day. She has never used smokeless tobacco. She reports current alcohol use. She reports that she does not use drugs. Current Outpatient Medications   Medication Sig Dispense Refill   • azelastine (ASTELIN) 0.1 % nasal spray 2 sprays into each nostril 2 (two) times a day Use in each nostril as directed     • buPROPion (WELLBUTRIN XL) 300 mg 24 hr tablet Take 1 tablet (300 mg total) by mouth daily 30 tablet 5   • cetirizine (ZyrTEC) 10 mg tablet Take 10 mg by mouth daily     • cyanocobalamin 1,000 mcg/mL Inject once per month. 3 mL 4   • cyclobenzaprine (FLEXERIL) 10 mg tablet TAKE 1 TABLET BY MOUTH EVERY 12 HOURS AS NEEDED FOR MODERATE TO SEVERE NECK PAIN/ MUSCLE SPASM 30 tablet 2   • dicyclomine (BENTYL) 10 mg capsule Take 1 capsule (10 mg total) by mouth 3 (three) times a day as needed (stomach pain) PRN 90 capsule 0   • gabapentin (NEURONTIN) 300 mg capsule TAKE 2 CAPSULES BY MOUTH qhs 180 capsule 2   • ketorolac (TORADOL) 30 mg/mL injection 1 mL or 30 mg/mL IM injection p.r.n. migraine, repeat after 6 hours if needed. Max two injections a week 4 mL 6   • lasmiditan succinate (REYVOW) 100 mg tablet Take 1/2- 1 tab qhs prn migraine. Caution sedation. Do not use more than one dose per 24 hours, or more than four doses per month.  4 tablet 6   • LORazepam (ATIVAN) 2 mg tablet Take 2 mg by mouth 3 (three) times a day as needed     • losartan (COZAAR) 50 mg tablet Take 50 mg by mouth daily     • oxyCODONE (OxyCONTIN) 10 mg 12 hr tablet Take 15 mg by mouth 2 (two) times a day      • predniSONE 10 mg tablet Take 4 tabs x 3 days, then 3 tabs x 3 days, then 2 tabs x 3 days, then 1 tab x 3 days, then 0.5 tab x 3 days, then stop 32 tablet 0   • promethazine (PHENERGAN) 25 mg tablet Take 1 tablet (25 mg total) by mouth every 6 (six) hours as needed for nausea or vomiting 90 tablet 0   • rizatriptan (MAXALT-MLT) 10 mg disintegrating tablet Take 1 tablet by mouth at the onset of migraine; if symptoms continue or return, make take another dose, 1 tablet by mouth, at least 2 hours after first dose. Take no more than 2 doses in a day 12 tablet 5   • rOPINIRole (REQUIP) 1 mg tablet Take 4 mg by mouth daily      • sertraline (ZOLOFT) 50 mg tablet TAKE 1 TABLET BY MOUTH EVERY DAY 90 tablet 1   • SUMAtriptan (IMITREX) 20 MG/ACT nasal spray Inhale 1 spray into ONE nostril at headache onset, repeat after 2 hours if needed. No more than 2 doses per day. Do Not take  Maxalt with this. 4 each 0   • Syringe/Needle, Disp, (SYRINGE 3CC/25GX1") 25G X 1" 3 ML MISC Use syringes for B12 and/or toradol injections 12 each 2   • Ubrogepant (UBRELVY) 100 MG tablet 1 tabs at migraine onset, repeat in 2 hours if needed. Max 2 per day. 16 tablet 11   • ACETAMINOPHEN-BUTALBITAL  MG TABS 1 tab at migraine onset, repeat after 4 hours if needed. Max 2 per day and 3 per week. 20 tablet 2     Current Facility-Administered Medications   Medication Dose Route Frequency Provider Last Rate Last Admin   • prochlorperazine (COMPAZINE) injection 5 mg  5 mg Intramuscular Q4H PRN Saeid Barton MD         She is allergic to metaxalone, aspirin, codeine, morphine, other, eggs or egg-derived products - food allergy, naproxen, and tapentadol. .         Objective:    Blood pressure 116/64, pulse 71, height 5' 5" (1.651 m), weight 78 kg (172 lb). Body mass index is 28.62 kg/m². Physical Exam    Neurological Exam  Vital signs reviewed. Well developed, well nourished. Mood and affect are pleasant. Head: Normocephalic, atraumatic  CN 3-64: intact and symmetric, including EOMs which are normal b/l, except I avoided pupil testing with light due to current migraine. We will test at follow-up/Botox appointment. MSK: 5/5 t/o.  ROM normal x all 4 extr.  Reflexes: 2+ and symmetric in all 4 extr. Coordination: Nml x4 extr. Gait: Steady normal gait. ROS:    Review of Systems   Constitutional: Negative for appetite change, fatigue and fever. HENT: Negative. Negative for hearing loss, tinnitus, trouble swallowing and voice change. Eyes: Negative. Negative for photophobia, pain and visual disturbance. Respiratory: Negative. Negative for shortness of breath. Cardiovascular: Negative. Negative for palpitations. Gastrointestinal: Negative. Negative for nausea and vomiting. Endocrine: Negative. Negative for cold intolerance. Genitourinary: Negative. Negative for dysuria, frequency and urgency. Musculoskeletal: Negative for back pain, gait problem, myalgias and neck pain. Skin: Negative. Negative for rash. Allergic/Immunologic: Negative. Neurological: Positive for headaches. Negative for dizziness, tremors, seizures, syncope, facial asymmetry, speech difficulty, weakness, light-headedness and numbness. Hematological: Negative. Does not bruise/bleed easily. Psychiatric/Behavioral: Negative. Negative for confusion, hallucinations and sleep disturbance. Ros reviewed. I have spent a total time of 40 minutes on 07/25/23 in caring for this patient including Risks and benefits of tx options, Instructions for management, Patient and family education, Importance of tx compliance, Risk factor reductions, Impressions, Counseling / Coordination of care, Documenting in the medical record, Reviewing / ordering tests, medicine, procedures   and Obtaining or reviewing history  .

## 2023-07-26 ENCOUNTER — TELEPHONE (OUTPATIENT)
Dept: INFUSION CENTER | Facility: CLINIC | Age: 73
End: 2023-07-26

## 2023-07-26 NOTE — TELEPHONE ENCOUNTER
New patient phone call made to confirm patients infusion appointment tomorrow 7/27/23 @ 10 AM. All questions answered and patient confirmed appointment date and time.

## 2023-07-27 ENCOUNTER — HOSPITAL ENCOUNTER (OUTPATIENT)
Dept: INFUSION CENTER | Facility: CLINIC | Age: 73
Discharge: HOME | End: 2023-07-27
Payer: MEDICARE

## 2023-07-27 VITALS
TEMPERATURE: 96.9 F | RESPIRATION RATE: 18 BRPM | DIASTOLIC BLOOD PRESSURE: 74 MMHG | SYSTOLIC BLOOD PRESSURE: 118 MMHG | HEART RATE: 75 BPM

## 2023-07-27 DIAGNOSIS — G43.701 CHRONIC MIGRAINE WITHOUT AURA WITH STATUS MIGRAINOSUS, NOT INTRACTABLE: Primary | ICD-10-CM

## 2023-07-27 PROCEDURE — 96365 THER/PROPH/DIAG IV INF INIT: CPT

## 2023-07-27 RX ORDER — SODIUM CHLORIDE 9 MG/ML
20 INJECTION, SOLUTION INTRAVENOUS ONCE
OUTPATIENT
Start: 2023-10-19

## 2023-07-27 RX ORDER — SODIUM CHLORIDE 9 MG/ML
20 INJECTION, SOLUTION INTRAVENOUS ONCE
Status: COMPLETED | OUTPATIENT
Start: 2023-07-27 | End: 2023-07-27

## 2023-07-27 RX ADMIN — SODIUM CHLORIDE 20 ML/HR: 0.9 INJECTION, SOLUTION INTRAVENOUS at 10:26

## 2023-07-27 RX ADMIN — EPTINEZUMAB-JJMR 100 MG: 100 INJECTION INTRAVENOUS at 10:49

## 2023-07-27 NOTE — PROGRESS NOTES
Pt arrived to unit without complaint. Pt tolerated Vyepti without incident. Appt card provided. Pt left unit in stable condition.

## 2023-08-20 DIAGNOSIS — G43.701 CHRONIC MIGRAINE WITHOUT AURA WITH STATUS MIGRAINOSUS, NOT INTRACTABLE: ICD-10-CM

## 2023-08-21 RX ORDER — SUMATRIPTAN 20 MG/1
SPRAY NASAL
Qty: 24 EACH | Refills: 0 | Status: SHIPPED | OUTPATIENT
Start: 2023-08-21

## 2023-09-19 ENCOUNTER — OFFICE VISIT (OUTPATIENT)
Dept: NEUROLOGY | Facility: CLINIC | Age: 73
End: 2023-09-19
Payer: MEDICARE

## 2023-09-19 VITALS
SYSTOLIC BLOOD PRESSURE: 133 MMHG | DIASTOLIC BLOOD PRESSURE: 85 MMHG | HEART RATE: 65 BPM | WEIGHT: 171 LBS | BODY MASS INDEX: 28.49 KG/M2 | HEIGHT: 65 IN

## 2023-09-19 DIAGNOSIS — M54.2 MYOFASCIAL NECK PAIN: Primary | ICD-10-CM

## 2023-09-19 DIAGNOSIS — G43.719 INTRACTABLE CHRONIC MIGRAINE WITHOUT AURA AND WITHOUT STATUS MIGRAINOSUS: ICD-10-CM

## 2023-09-19 PROCEDURE — 20553 NJX 1/MLT TRIGGER POINTS 3/>: CPT | Performed by: PHYSICIAN ASSISTANT

## 2023-09-19 PROCEDURE — 99213 OFFICE O/P EST LOW 20 MIN: CPT | Performed by: PHYSICIAN ASSISTANT

## 2023-09-19 RX ORDER — KETOROLAC TROMETHAMINE 30 MG/ML
60 INJECTION, SOLUTION INTRAMUSCULAR; INTRAVENOUS ONCE
Status: COMPLETED | OUTPATIENT
Start: 2023-09-19 | End: 2023-09-19

## 2023-09-19 RX ORDER — RIZATRIPTAN BENZOATE 10 MG/1
TABLET, ORALLY DISINTEGRATING ORAL
Qty: 12 TABLET | Refills: 5 | Status: SHIPPED | OUTPATIENT
Start: 2023-09-19

## 2023-09-19 RX ORDER — BUPIVACAINE HYDROCHLORIDE 2.5 MG/ML
2.5 INJECTION, SOLUTION EPIDURAL; INFILTRATION; INTRACAUDAL ONCE
Status: COMPLETED | OUTPATIENT
Start: 2023-09-19 | End: 2023-09-19

## 2023-09-19 RX ORDER — LIDOCAINE HYDROCHLORIDE 10 MG/ML
2.5 INJECTION, SOLUTION INFILTRATION; PERINEURAL ONCE
Status: COMPLETED | OUTPATIENT
Start: 2023-09-19 | End: 2023-09-19

## 2023-09-19 RX ADMIN — KETOROLAC TROMETHAMINE 60 MG: 30 INJECTION, SOLUTION INTRAMUSCULAR; INTRAVENOUS at 15:32

## 2023-09-19 RX ADMIN — LIDOCAINE HYDROCHLORIDE 2.5 ML: 10 INJECTION, SOLUTION INFILTRATION; PERINEURAL at 15:33

## 2023-09-19 RX ADMIN — BUPIVACAINE HYDROCHLORIDE 2.5 ML: 2.5 INJECTION, SOLUTION EPIDURAL; INFILTRATION; INTRACAUDAL at 15:30

## 2023-09-19 NOTE — PROGRESS NOTES
Patient ID: Simon Jones is a 71 y.o. female. Assessment/Plan:       Diagnoses and all orders for this visit:    Myofascial neck pain  -     bupivacaine (PF) (MARCAINE) 0.25 % injection 2.5 mL  -     lidocaine (XYLOCAINE) 1 % injection 2.5 mL  -     ketorolac (TORADOL) 60 mg/2 mL IM injection 60 mg  -     Trigger Injection 1 or 2 muscles    Intractable chronic migraine without aura and without status migrainosus  -     rizatriptan (MAXALT-MLT) 10 mg disintegrating tablet; Take 1 tablet by mouth at the onset of migraine; if symptoms continue or return, make take another dose, 1 tablet by mouth, at least 2 hours after first dose. Take no more than 2 doses in a day  -     ketorolac (TORADOL) 60 mg/2 mL IM injection 60 mg         The patient requested trigger point injections on an emergent basis today to break current cycle; see below. Continue Vyepti infusions. Continue Botox injections every 90 days approximately. Since starting botox, the patient reports greater than 7 days of migraine relief from baseline, correlated with headache diary, decreased abortive medication use and decreased ER visits.     Continue gabapentin 600 mg nightly for prevention of migraine, cannot tolerate daytime dose.     As needed migraine onset:  --She uses Maxalt or Ubrelvy. The patient does not use these together within a 24-hour period. --Not sure if Silas Jefferson is covered, she states there has been a change in the insurance and hopefully prior Cayetano Peters will be approved. --Add promethazine 25 mg every 6 hours as needed nausea with the migraine. Add Toradol injection sparingly if above fails. -- Reyvow to break a migraine cycle, at bedtime.   Cautioned about sedation, not to drive after taking.  -- also discussed conservative/nonmedication methods such as Biofreeze, Aspercreme on neck, etc., when she gets a part-time job she will hopefully be able to restart massage therapy which worked before.     The patient was encouraged to continue to hydrate with at least 60 ounces of water daily or a little more than that if tolerated. The patient avoids all other NSAIDs due to GI distress.     Continue B12 injections at home monthly. She has no problem with administering these to her self. The patient should not hesitate to call me prior to her follow up with any questions or concerns. Universal Protocol:  Consent: The procedure was performed in an emergent situation. Verbal consent obtained. Risks and benefits: risks, benefits and alternatives were discussed  Consent given by: patient    Procedure Details  Location(s):  Patient tolerance: patient tolerated the procedure well with no immediate complications  Additional procedure details: Trigger point injections were performed on an emergent basis and are a part of ongoing therapy. Risks, benefits, alternatives, infection, bleeding and allergic reaction were discussed with the patient. Verbal consent was obtained prior to the procedure and is detailed in the patient's record. Trigger point injections were performed in the following locations using a mixture of 2.5 mL 0.25% bupivacaine + 2.5 mL of 1% lidocaine, without steroid, using a 30 gauge, 1/2 inch needle: Right and left  muscles, procerus, bilateral frontalis muscles, bilateral splenius capitis, bilateral upper and middle trapezius muscles. .                Subjective:    HPI    Ms. Jovita Bauman is a very pleasant 80-year-old female who is here for neurological follow-up for migraine headaches. Now following with [facility] pain management status post bilateral L4-5 L5-S1 MBB #1 1/3/1723. Recent right-sided facet rhizotomy 4/27/2023 in the low back. S/p C4-7 ACDF 10/24/2022 (R arm and shoulder pain significantly improved after the surgery thankfully.  She states her activities of stenciling which she loves to do sometimes makes her R shoulder act up).     Today, 9/19/23: Has been better, has a really bad migraine today. Pt states she had cataracts surgery in Aug on both eyes. 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 The patient's client does not use the air conditioning during the day since it is too expensive. The patient states that she started working during the evening/night hours and her migraine headaches significantly subsided because she is not exposed to significant heat. Her client has a small air conditioner in her room and sleeps in their, the [facility] is only turned on at night.     Migraines  Onset: Began early 25s     Current pain: 7/10  Reaches pain level at worst: 10 out of 10     Frequency: 15 per month  Duration: A couple days depending on the medication she takes and/or triggers  Location: Ocular pain bilaterally right >left, which progresses to generalized pain throughout the head/holocranial and radiating to the occipital region  Quality: Sharp, knifelike, tension  Sometimes alleviated with ice packs, ties a scarf on head, used to use Aspercreme on neck  Associated with: nausea, photophobia, phonophobia, sometimes blurry vision, no autonomic features, stiff/sore neck, sometimes congestion, sometimes dizziness     Triggers:   -- Sunlight, wears sunglasses around the house sometimes  --Hot weather  --Stress  --Neck pain     Aura/ warning: none     Medications tried:  Prevention-  Gabapentin- can only take qhs  Tizanidine  Emgality  Verapamil  Duloxetine  Baclofen  Qulipta-side effects  Lexapro  Doxepin  Bisoprolol  Vyepti     Abortive-  Rizatriptan  Fioricet  Narcotics-oxycodone  Toradol injection  Toradol p.o.   Ativan  Prednisone taper  Promethazine  Olanzapine  Compazine-worsened restless leg symptoms  Zofran     Other non-medication therapies or treatments-  -- Trigger point injections were helpful in the past  --Botox  -- [facility] chiropractic care for neck pain-helpful  -- Medical marijuana  --Massage helpful but expensive  -- Acupuncture not helpful     She used to see Dr. Juan Daniel Ontiveros at [facility], 10/9/2020     Follows with a psychiatrist at [facility].     Sleep concerns:  --  She states that she has poor sleeping patterns, sleeping 3-4 hours a day. Rayo Cruz has problems falling asleep and staying asleep.     Family hx of migraines:  Family hx of cerebral aneurysms:     Lifestyle: In terms of her lifestyle patient lives alone.  She drinks only decaf coffee. She does smoke, she drinks occasionally and denies use of illicit drugs.     Diagnostics:  Brain MRI with and without contrast 8/13/2020: "No mass or abnormal contrast enhancement. Mild degree of FLAIR hyperintensity in cerebral white matter and in the yolanda. Chronic small vessel ischemic disease is the most likely etiology. Other possible etiologies include gliosis and demyelination. "     Cervical spine MRI without contrast 1/24/2022: "Progression of multilevel degenerative change in the cervical spine. Disc osteophyte complex causes mild spinal cord impingement at C4-C5. Disc osteophyte complex at C5-C6 mildly abuts the ventral spinal cord. There is no abnormal cord signal identified.  Neural foraminal narrowing at multiple levels as detailed above."     Most recent labs 5-23 with a BUN of 26, GFR 46, otherwise unremarkable CMP, CBC and differential with white blood cells 12.6, absolute neutrophils 8.74.     Prior documentation:  Unfortunately the patient stepped on a broken piece of glass that she could not see on her carpet.  She accidentally broke a piece of glass 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 of her art work. Randal Smith states she did not have a vacuum , it was broken.  She stepped on the glass and it went into her right heel and it is near the bone per her history. Randal Smith states she went to see the doctor and he could not remove it now she is scheduled for surgery 5/8/2023 for foreign body removal.  She denies any infectious symptoms, states it does not look red or indurated.  Not bleeding.  It is painful, so she steps on the balls of her feet so that she does not have to put pressure on the heel.  She is tolerating the pain, it is not excessive.  She states if it gets too painful she will go to the emergency room or call her doctor.     Her migraine headaches are better managed now that she had a repeat Botox injection this past March, it was on 3/28/2023.  She still gets frequent migraine headaches and we added Ava Cortes for this as a prevention method at the last visit. Carina Centeno states after a few days of taking it at 30 mg she was getting a bit nauseous so she stopped it. Carina Centeno would be agreeable to taking a half dose of that or decreasing the dose.  She did take it with food and it still made her slightly nauseous.     She continues to have specific triggers, including rainy days.  It feels like her eyes are going to pop out when it is a rainy day or when the weather changes.  She also notes cataracts bilaterally and she states she needs to get them removed, she thinks this contributes to migraines.     Otherwise no changes in character to her migraine headaches.  She uses either Maxalt or sumatriptan nasal spray and she is happy with the effectiveness of either of these.  Sumatriptan nasal spray sometimes causes a bad taste in the mouth so she chews gum and this helps.  She does not take these to triptans together. Carina Centeno does not typically use Ravenelle but has it on hand if needed, this does work too.     Prior note:   She reports 2 migraine headaches per week  She typically is triggered by stress, and sometimes physical therapy triggers her headaches.  When she works on weekends it increases her stress and headaches.     She reports that migraine headaches have made a slight increase in frequency since she had a cervical fusion status post 10/24/2022.  Right arm and shoulder pain significantly improved after the surgery thankfully.  She states her activities of stenciling which she loves to do sometimes makes her R shoulder act up.     She reports that Maxalt ODT works well to alleviate the migraine within about 20 minutes, and sometimes she needs to take a second one after 2 hours. Toradol injection does not work well. Ativan does help with neck tension and myofascial pain. She took a half tab of reyvow once to try it out and it did reduce the migraine but the patient does not report sedation or any other side effects to this.     She has a tendency to get nausea and sick with a migraine and is asking for an alternative nausea medication other than Compazine and Zofran.       The following portions of the patient's history were reviewed and updated as appropriate:   She  has a past medical history of Allergic rhinitis, Anxiety, Arthritis, Cancer ([address]), Chronic pain disorder, Chronic prescription opiate use, Depression, GERD (gastroesophageal reflux disease), Hypertension, Irritable bowel syndrome, Migraines, and Spinal stenosis.   She   Patient Active Problem List    Diagnosis Date Noted   • Myofascial neck pain 10/06/2023   • S/P cervical spinal fusion 07/25/2023   • Decreased GFR 07/25/2023   • Chronic migraine without aura with status migrainosus, not intractable 05/24/2022   • Cervical radiculopathy    • Carpal tunnel syndrome on right    • Foraminal stenosis of cervical region 10/03/2021   • Degenerative disc disease, cervical 10/03/2021   • Cigarette smoker 10/03/2021   • Hypertension 10/03/2021   • B12 deficiency 11/05/2020   • Restless leg syndrome 11/05/2020   • Iron deficiency 11/05/2020   • Myofascial muscle pain 09/25/2020   • Myofascial pain 08/10/2020   • Intractable chronic migraine without aura and without status migrainosus 08/04/2020   • Intractable migraine without aura 07/30/2020   • 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   • Primary insomnia 05/07/2019   • Insomnia due to other mental disorder 04/09/2019   • Anxiety and depression 01/10/2019   • Recurrent major depressive disorder, in partial remission ([address]) 04/25/2018     She  has a past surgical history that includes Colonoscopy; Cholecystectomy; Rotator cuff repair (Bilateral); pr blepharoplasty upper eyelid w/excessive skin (Bilateral, 01/29/2020); Replacement total knee (Right); and Cervical fusion. Her family history includes Alzheimer's disease in her father; Breast cancer in her mother; Depression in her sister. She  reports that she has been smoking cigarettes. She has been smoking an average of .5 packs per day. She has never used smokeless tobacco. She reports current alcohol use. She reports that she does not use drugs. Current Outpatient Medications   Medication Sig Dispense Refill   • ACETAMINOPHEN-BUTALBITAL  MG TABS 1 tab at migraine onset, repeat after 4 hours if needed. Max 2 per day and 3 per week. 20 tablet 2   • azelastine (ASTELIN) 0.1 % nasal spray 2 sprays into each nostril 2 (two) times a day Use in each nostril as directed     • buPROPion (WELLBUTRIN XL) 300 mg 24 hr tablet Take 1 tablet (300 mg total) by mouth daily 30 tablet 5   • cetirizine (ZyrTEC) 10 mg tablet Take 10 mg by mouth daily     • cyanocobalamin 1,000 mcg/mL Inject once per month. 3 mL 4   • cyclobenzaprine (FLEXERIL) 10 mg tablet TAKE 1 TABLET BY MOUTH EVERY 12 HOURS AS NEEDED FOR MODERATE TO SEVERE NECK PAIN/ MUSCLE SPASM 30 tablet 2   • dicyclomine (BENTYL) 10 mg capsule Take 1 capsule (10 mg total) by mouth 3 (three) times a day as needed (stomach pain) PRN 90 capsule 0   • gabapentin (NEURONTIN) 300 mg capsule TAKE 2 CAPSULES BY MOUTH qhs 180 capsule 2   • ketorolac (TORADOL) 30 mg/mL injection 1 mL or 30 mg/mL IM injection p.r.n. migraine, repeat after 6 hours if needed. Max two injections a week 4 mL 6   • lasmiditan succinate (REYVOW) 100 mg tablet Take 1/2- 1 tab qhs prn migraine. Caution sedation.  Do not use more than one dose per 24 hours, or more than four doses per month. 4 tablet 6   • LORazepam (ATIVAN) 2 mg tablet Take 2 mg by mouth 3 (three) times a day as needed     • losartan (COZAAR) 50 mg tablet Take 50 mg by mouth daily     • oxyCODONE (OxyCONTIN) 10 mg 12 hr tablet Take 15 mg by mouth 2 (two) times a day      • predniSONE 10 mg tablet Take 4 tabs x 3 days, then 3 tabs x 3 days, then 2 tabs x 3 days, then 1 tab x 3 days, then 0.5 tab x 3 days, then stop 32 tablet 0   • promethazine (PHENERGAN) 25 mg tablet Take 1 tablet (25 mg total) by mouth every 6 (six) hours as needed for nausea or vomiting 90 tablet 0   • rizatriptan (MAXALT-MLT) 10 mg disintegrating tablet Take 1 tablet by mouth at the onset of migraine; if symptoms continue or return, make take another dose, 1 tablet by mouth, at least 2 hours after first dose. Take no more than 2 doses in a day 12 tablet 5   • rOPINIRole (REQUIP) 1 mg tablet Take 4 mg by mouth daily      • sertraline (ZOLOFT) 50 mg tablet TAKE 1 TABLET BY MOUTH EVERY DAY 90 tablet 1   • SUMAtriptan (IMITREX) 20 MG/ACT nasal spray Inhale 1 spray into ONE nostril at headache onset, may repeat after 2 hours if needed. No more than 2 doses per day. Do Not take Maxalt with this. 24 each 0   • Syringe/Needle, Disp, (SYRINGE 3CC/25GX1") 25G X 1" 3 ML MISC Use syringes for B12 and/or toradol injections 12 each 2   • Ubrogepant (UBRELVY) 100 MG tablet 1 tabs at migraine onset, repeat in 2 hours if needed. Max 2 per day. 16 tablet 11     Current Facility-Administered Medications   Medication Dose Route Frequency Provider Last Rate Last Admin   • prochlorperazine (COMPAZINE) injection 5 mg  5 mg Intramuscular Q4H PRN Virginia Adams MD         She is allergic to metaxalone, aspirin, codeine, morphine, other, eggs or egg-derived products - food allergy, naproxen, and tapentadol. .         Objective:    Blood pressure 133/85, pulse 65, height 5' 5" (1.651 m), weight 77.6 kg (171 lb). Body mass index is 28.46 kg/m².     Physical Exam    Neurological Exam  On neurologic exam, the patient is alert and oriented to time and place. Speech is fluent and articulate, and the patient follows commands appropriately. Judgment and affect appear normal. Pupils are equally round and reactive to light and extraocular muscles are intact without nystagmus. Face is symmetric, and tongue, uvula, and palate are midline. Hearing is intact. Motor examination reveals intact strength throughout. Normal gait is steady. ROS:    Review of Systems   Constitutional: Negative for appetite change, fatigue and fever. HENT: Negative. Negative for hearing loss, tinnitus, trouble swallowing and voice change. Eyes: Positive for photophobia. Negative for pain and visual disturbance. Respiratory: Negative. Negative for shortness of breath. Cardiovascular: Negative. Negative for palpitations. Gastrointestinal: Positive for nausea. Negative for vomiting. Endocrine: Negative. Negative for cold intolerance. Genitourinary: Negative. Negative for dysuria, frequency and urgency. Musculoskeletal: Negative for back pain, gait problem, myalgias and neck pain. Skin: Negative. Negative for rash. Allergic/Immunologic: Negative. Neurological: Positive for headaches. Negative for dizziness, tremors, seizures, syncope, facial asymmetry, speech difficulty, weakness, light-headedness and numbness. Hematological: Negative. Does not bruise/bleed easily. Psychiatric/Behavioral: Negative. Negative for confusion, hallucinations and sleep disturbance. ROS reviewed.

## 2023-10-03 ENCOUNTER — TELEPHONE (OUTPATIENT)
Dept: NEUROLOGY | Facility: CLINIC | Age: 73
End: 2023-10-03

## 2023-10-03 NOTE — TELEPHONE ENCOUNTER
LMOM for patient to call us back if she is interested in rescheduling her missed appt with [address] today for her Botox

## 2023-10-06 PROBLEM — M54.2 MYOFASCIAL NECK PAIN: Status: ACTIVE | Noted: 2023-10-06

## 2023-10-06 NOTE — TELEPHONE ENCOUNTER
received vm from 10/5 at 9:59am-  Hi, this is [facility] rupa, it's 10 o'clock. I missed an appointment. I have to reschedule and I need to know when my infusion is because I don't wanna miss that. Can someone call me right away? Thank you. [phone]. Birth date, 1950.  Thank you.  -------------------------------------------------  appt already rescheduled    Called pt and made her aware that infusion is scheduled for 10/19 at 11am

## 2023-10-18 ENCOUNTER — TELEPHONE (OUTPATIENT)
Dept: OTHER | Facility: OTHER | Age: 73
End: 2023-10-18

## 2023-10-19 NOTE — TELEPHONE ENCOUNTER
Patient is calling regarding cancelling an appointment.     Date/Time: 10/19/2023    Patient was rescheduled: YES [] NO [x]    Patient requesting call back to reschedule: YES [] NO [x]

## 2023-10-23 ENCOUNTER — TELEPHONE (OUTPATIENT)
Dept: OTHER | Facility: OTHER | Age: 73
End: 2023-10-23

## 2023-10-24 ENCOUNTER — TELEPHONE (OUTPATIENT)
Dept: NEUROLOGY | Facility: CLINIC | Age: 73
End: 2023-10-24

## 2023-10-24 NOTE — TELEPHONE ENCOUNTER
MSW phoned pt and lvm requesting a call back regarding transportation.  Pt has upcoming appt with neuro on   11/14/2023 at 9am

## 2023-10-24 NOTE — TELEPHONE ENCOUNTER
----- Message from Bhavya Perez sent at 10/24/2023  8:30 AM EDT -----    ----- Message -----  From: Jazmin Larson  Sent: 10/24/2023   8:21 AM EDT  To: Bhavya Perez    Good morning,    So this patient Janelle Shell, has missed two Botox appts because she doesn't have a ride, she is older and I was wondering if maybe we can set her up for a ride pickup for her appts. She see's [address] [facility] in [facility] office. Let me know if its something we can do for her!     Thank you,    [facility]

## 2023-10-24 NOTE — LETTER
FACSIMILE TRANSMITTAL SHEET  to: Sejal Antonio  from: MIGUELINA Self          company: Infusion center   date: 10/25/2023          RECIPIENT's fax number:   total no. of pages including cover:           RECIPIENT'S Phone number: ( )   sender's FAX number:           rE:   SENDER'S PHONE number: [phone]           [] Urgent  [] For Review[] Please Comment [] Please Reply   notes/Comments:      Kevin Coe,    Could you please ask patient sign Collette Corns form ? Once completed can your staff scan in epic? Thank you so much! John Yoo   [phone]                CONFIDENTIALITY NOTICE  This transmission is intended only for the use of the individual or entity to which it is addressed and may contain information that is privileged and confidential.  If the reader of this message is not the intended recipient, you are hereby notified that any disclosure, distribution, or copying of this information is strictly prohibited.   If you have received this transmission in error, please notify us immediately by telephone, and return the original documents to us at the above address via the [place]

## 2023-10-24 NOTE — TELEPHONE ENCOUNTER
Patient is calling regarding cancelling an appointment. Date/Time:10/24/2023/ 8:30 A. M.     Patient was rescheduled: YES [] NO [x]    Patient requesting call back to reschedule: YES [] NO [x]
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
## 2023-10-24 NOTE — TELEPHONE ENCOUNTER
received vm from 10/23 at 1:15pm-A Hi, this is Isadora Lala, birth date is [birth year]. I am calling to confirm my botox appointment with Soco Kwok tomorrow at 8:30 in the morning. I will be there. Thank you.  Linda Alaniz.  [phone]  -----------------------------------------------------  See health call encounter from 10/24-pt cancelled 10/24 appt    Please also see 10/24 encounter
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
## 2023-10-25 NOTE — TELEPHONE ENCOUNTER
MSW phoned infusion center and spoke with Denise Cisneros who informed that he is willing to provide lantavan form to pt to sign.      Lanta senior form was sent to Denise Cisneros via fax to:  [phone]

## 2023-10-26 ENCOUNTER — HOSPITAL ENCOUNTER (OUTPATIENT)
Dept: INFUSION CENTER | Facility: CLINIC | Age: 73
Discharge: HOME | End: 2023-10-26
Payer: MEDICARE

## 2023-10-26 ENCOUNTER — PATIENT OUTREACH (OUTPATIENT)
Dept: CASE MANAGEMENT | Facility: HOSPITAL | Age: 73
End: 2023-10-26

## 2023-10-26 VITALS
DIASTOLIC BLOOD PRESSURE: 78 MMHG | SYSTOLIC BLOOD PRESSURE: 110 MMHG | TEMPERATURE: 97.6 F | RESPIRATION RATE: 18 BRPM | HEART RATE: 102 BPM

## 2023-10-26 DIAGNOSIS — G43.701 CHRONIC MIGRAINE WITHOUT AURA WITH STATUS MIGRAINOSUS, NOT INTRACTABLE: Primary | ICD-10-CM

## 2023-10-26 PROCEDURE — 96365 THER/PROPH/DIAG IV INF INIT: CPT

## 2023-10-26 RX ORDER — SODIUM CHLORIDE 9 MG/ML
20 INJECTION, SOLUTION INTRAVENOUS ONCE
OUTPATIENT
Start: 2024-01-11

## 2023-10-26 RX ORDER — SODIUM CHLORIDE 9 MG/ML
20 INJECTION, SOLUTION INTRAVENOUS ONCE
Status: COMPLETED | OUTPATIENT
Start: 2023-10-26 | End: 2023-10-26

## 2023-10-26 RX ADMIN — SODIUM CHLORIDE 20 ML/HR: 0.9 INJECTION, SOLUTION INTRAVENOUS at 09:38

## 2023-10-26 RX ADMIN — EPTINEZUMAB-JJMR 100 MG: 100 INJECTION INTRAVENOUS at 09:48

## 2023-10-26 NOTE — TELEPHONE ENCOUNTER
MIGUELINA Vazquez met with pt and she signed lantavan application, and submitted to lanta.  MSW will continue to follow up with [place]

## 2023-10-26 NOTE — PROGRESS NOTES
OSW met briefly with patient to facilitate completion of [employer] application sent over from Neurology SW. Information emailed over to August Bar, copies of documents provided to patient and application to be scanned into patient's chart.

## 2023-10-31 NOTE — TELEPHONE ENCOUNTER
MSW phoned pt and lvm informing that she is active with Karen, please call back to inform if she needs assistance with scheduling trip with Jhon Johnson.

## 2023-11-01 NOTE — TELEPHONE ENCOUNTER
MSW phoned pt and lvm requesting a call back.  Please contact aidan to schedule medical trips at [phone]

## 2023-11-02 NOTE — TELEPHONE ENCOUNTER
Attempt #3  MSW phoned pt and lvm requesting a call back.  Please contact aidan to schedule medical trips at [phone]

## 2023-11-07 DIAGNOSIS — G43.719 INTRACTABLE CHRONIC MIGRAINE WITHOUT AURA AND WITHOUT STATUS MIGRAINOSUS: ICD-10-CM

## 2023-11-07 DIAGNOSIS — R11.0 NAUSEA: ICD-10-CM

## 2023-11-07 DIAGNOSIS — F51.01 PRIMARY INSOMNIA: ICD-10-CM

## 2023-11-07 DIAGNOSIS — G43.701 CHRONIC MIGRAINE WITHOUT AURA WITH STATUS MIGRAINOSUS, NOT INTRACTABLE: ICD-10-CM

## 2023-11-07 RX ORDER — GABAPENTIN 300 MG/1
CAPSULE ORAL
Qty: 180 CAPSULE | Refills: 0 | Status: CANCELLED | OUTPATIENT
Start: 2023-11-07

## 2023-11-08 RX ORDER — DICYCLOMINE HYDROCHLORIDE 10 MG/1
10 CAPSULE ORAL 3 TIMES DAILY PRN
Qty: 90 CAPSULE | Refills: 0 | Status: SHIPPED | OUTPATIENT
Start: 2023-11-08

## 2023-11-08 RX ORDER — PROMETHAZINE HYDROCHLORIDE 25 MG/1
25 TABLET ORAL EVERY 6 HOURS PRN
Qty: 90 TABLET | Refills: 2 | Status: SHIPPED | OUTPATIENT
Start: 2023-11-08

## 2023-11-08 NOTE — TELEPHONE ENCOUNTER
Chart reivewejaqueline Garciayl was initially ordered by Dr Tanvir Rojas last year for nausea. Are you agreeable to order this?

## 2023-11-11 DIAGNOSIS — G43.701 CHRONIC MIGRAINE WITHOUT AURA WITH STATUS MIGRAINOSUS, NOT INTRACTABLE: ICD-10-CM

## 2023-11-11 RX ORDER — PROMETHAZINE HYDROCHLORIDE 25 MG/1
25 TABLET ORAL EVERY 6 HOURS PRN
Qty: 90 TABLET | Refills: 0 | Status: CANCELLED | OUTPATIENT
Start: 2023-11-11

## 2023-11-13 ENCOUNTER — TELEPHONE (OUTPATIENT)
Dept: NEUROLOGY | Facility: CLINIC | Age: 73
End: 2023-11-13

## 2023-11-13 NOTE — TELEPHONE ENCOUNTER
Cintia Bauman called in today stating that needs to cancel her upcoming appt on TUES 11/14/23 at 9am w/[place]-  per pt- can not make in to the appt- will call back to r/s later.

## 2024-01-09 ENCOUNTER — TELEPHONE (OUTPATIENT)
Dept: NEUROLOGY | Facility: CLINIC | Age: 74
End: 2024-01-09

## 2024-01-09 NOTE — TELEPHONE ENCOUNTER
LMOM to complete registration and review covid screening questions for upcoming appointment 2/17/2021 12:15PM [address] at [facility], please transfer to [phone]  ADVISE OF NO SHOW FEE  Please make aware of mask and visitor policy  Patient ID: Archana Mullen is a 35 y.o. female.    Embryo Transfer    Date/Time: 1/9/2024 11:47 AM    Performed by: Monalisa Mesa MD  Authorized by: KATHERINE Flower    Consent:     Consent obtained:  Written    Consent given by:  Patient    Procedure risks and benefits discussed: yes      Patient questions answered: yes      Patient agrees, verbalizes understanding, and wants to proceed: yes      Educational handouts given: yes      Instructions and paperwork completed: yes    Procedure:     Pelvic exam performed: No      Cervix cleaned and prepped: Yes      Speculum placed in vagina: Yes      Ultrasound guidance: Yes      Catheter type:  Sure View Roth    Difficulty: easy      Estimated blood loss:  None  Post-procedure:     Patient tolerated procedure well: yes    Comments:      Preop diagnosis: Infertility  Post op diagnosis: Same  Assistant: None  Depth: - cm  Curve: 30 deg  Distance from fundus: 11 mm  Embryo stage at day of transfer: day 5  Embryo notes: hatching blast   Additional Notes:  Anesthesia: None    IV Fluids: None  UOP: Not recorded  Specimens: None  Complications: None  This replaces an operative report.

## 2024-01-11 DIAGNOSIS — G43.701 CHRONIC MIGRAINE WITHOUT AURA WITH STATUS MIGRAINOSUS, NOT INTRACTABLE: Primary | ICD-10-CM

## 2024-01-31 ENCOUNTER — TELEPHONE (OUTPATIENT)
Dept: NEUROLOGY | Facility: CLINIC | Age: 74
End: 2024-01-31

## 2024-01-31 NOTE — TELEPHONE ENCOUNTER
Patient looking to schedule appointment missed on 11/14/23. I couldn't find another 60 min appointment. Can you assist?

## 2024-02-19 DIAGNOSIS — G43.701 CHRONIC MIGRAINE WITHOUT AURA WITH STATUS MIGRAINOSUS, NOT INTRACTABLE: ICD-10-CM

## 2024-02-20 RX ORDER — SUMATRIPTAN 20 MG/1
SPRAY NASAL
Qty: 24 EACH | Refills: 0 | Status: SHIPPED | OUTPATIENT
Start: 2024-02-20

## 2024-02-28 ENCOUNTER — TELEPHONE (OUTPATIENT)
Dept: NEUROLOGY | Facility: CLINIC | Age: 74
End: 2024-02-28

## 2024-02-28 NOTE — TELEPHONE ENCOUNTER
2/27 8:38    Pt left a VM re: worsening migraines and scheduling an appt.    Transcribed VM:   Hi, this is Jovita Bauman, November 26, 1950. I need to come in to see Mady Borrego much sooner than you have me scheduled. I've gotten migraines that are much, much worse than usual. And I have another one starting today, and I have to go to work. So I will take whatever she can give me, but I need to see or this week. And I would like to start the infusions as soon as possible. Please have her call me or just makes the appointment and call me I'll be at work, but I have my phone on me at all times. Thank you. [phone]. Please call. Thank you.    Pt currently scheduled on 3/12.

## 2024-03-01 RX ORDER — SUMATRIPTAN 20 MG/1
SPRAY NASAL
Qty: 24 EACH | Refills: 3 | OUTPATIENT
Start: 2024-03-01

## 2024-03-01 NOTE — TELEPHONE ENCOUNTER
Pt called in to have her   SUMAtriptan (IMITREX) 20 MG/ACT nasal spray  filled.     Pt stated she would like call and/or [email or web address] message sent once rx is sent over.

## 2024-03-04 NOTE — TELEPHONE ENCOUNTER
03-1-2024, 1:30:27 pm EST    Patient left voicemail requesting call back regarding medication. States she got a message stating refills were sent, but is unable to  at the pharmacy. (Did not indicate which medication she was referring to).    [phone].

## 2024-03-04 NOTE — TELEPHONE ENCOUNTER
Chart reviewed  MK has open slot today at 1:15 in [facility] office    Called [phone] and left a message on pt's answering machine for a call back    Clerical team,   Unable to reach pt. Can you pls mail a letter?    thanks

## 2024-03-05 ENCOUNTER — PATIENT MESSAGE (OUTPATIENT)
Dept: NEUROLOGY | Facility: CLINIC | Age: 74
End: 2024-03-05

## 2024-03-06 NOTE — PATIENT COMMUNICATION
A hi, this is Jovita calloway. 1126. 1950. I had called and asked for a sooner date being on the cancellation list. Somebody called me back and asked me if I was still interested in seeing medicaid sooner. Yes, definitely. If you can call me back [phone]. Thank you very much bye.  ------------------------------------------  See encounter from 2/28, sandrine left message as their was an appt available on 3/4    3/7 8:15 available in CV    Left message for pt offering 3/7 appt in  but noted that sooner appt in [place] was not available.  Asked that she call back or send [employer] message if she would like to schedule 3/7 appt    [employer] message sent to pt

## 2024-03-12 ENCOUNTER — OFFICE VISIT (OUTPATIENT)
Dept: NEUROLOGY | Facility: CLINIC | Age: 74
End: 2024-03-12
Payer: MEDICARE

## 2024-03-12 ENCOUNTER — TELEPHONE (OUTPATIENT)
Dept: NEUROLOGY | Facility: CLINIC | Age: 74
End: 2024-03-12

## 2024-03-12 VITALS
DIASTOLIC BLOOD PRESSURE: 71 MMHG | HEART RATE: 101 BPM | SYSTOLIC BLOOD PRESSURE: 119 MMHG | WEIGHT: 166 LBS | HEIGHT: 65 IN | BODY MASS INDEX: 27.66 KG/M2

## 2024-03-12 DIAGNOSIS — G43.701 CHRONIC MIGRAINE WITHOUT AURA WITH STATUS MIGRAINOSUS, NOT INTRACTABLE: Primary | ICD-10-CM

## 2024-03-12 PROCEDURE — 64615 CHEMODENERV MUSC MIGRAINE: CPT | Performed by: PHYSICIAN ASSISTANT

## 2024-03-12 NOTE — TELEPHONE ENCOUNTER
Tried to call patient twice and number didn't work,will send my chart message to get her rescheduled from missing appt today with MK.

## 2024-03-12 NOTE — TELEPHONE ENCOUNTER
MSW phoned [facility] and spoke with Ed who confirmed that pt was on her way home. MSW remains available for future social needs

## 2024-03-12 NOTE — TELEPHONE ENCOUNTER
MSW phoned [facility] at [phone] and was informed that patient did not book a [place]anta van trip today.     MSW phoned pt at [phone]- pt is not able to receive calls at this time.     MSW phoned [facility] and spoke with Obdulia who provided the phone to pt. Pt informed that her  informed her that she had to leave and would not be able to stay. Pt attempted to call [facility] but they do not do last minute reservations.   MSW informed pt that a lyft will be arranged for her as a last resort. Pt is aware that this is not an ongoing transportation service.     MSW phoned [facility] and ordered the ride. Info provided to Obdulia quevedo arriving in 3 mins.   Drivers name is Ezequiel.

## 2024-03-12 NOTE — PROGRESS NOTES
Patient ID: Jovita Bauman is a 73 y.o. female.    Assessment/Plan:       There are no diagnoses linked to this encounter.       Subjective:    HPI  Has had a lot of migraines lately, says her life has been turned upside down. Medications are helping.      Ms. Jovita Bauman is a very pleasant 72-year-old female who is here for neurological follow-up for migraine headaches.  Now following with [facility] pain management status post bilateral L4-5 L5-S1 MBB #1 1/3/1723.  Recent right-sided facet rhizotomy 4/27/2023 in the low back.  S/p C4-7 ACDF 10/24/2022 (R arm and shoulder pain significantly improved after the surgery thankfully.  She states her activities of stenciling which she loves to do sometimes makes her R shoulder act up).     Today, 9/19/23: Has been better, has a really bad migraine today. Pt states she had cataracts surgery in Aug on both eyes.     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  The patient's client does not use the air conditioning during the day since it is too expensive.  The patient states that she started working during the evening/night hours and her migraine headaches significantly subsided because she is not exposed to significant heat.  Her client has a small air conditioner in her room and sleeps in their, the AC is only turned on at night.     Migraines  Onset: Began early 20s     Current pain: 7/10  Reaches pain level at worst: 10 out of 10     Frequency: 15 per month  Duration: A couple days depending on the medication she takes and/or triggers  Location: Ocular pain bilaterally right >left, which progresses to generalized pain throughout the head/holocranial and radiating to the occipital region  Quality: Sharp, knifelike, tension  Sometimes alleviated with ice packs, ties a scarf on head, used to use Aspercreme on neck  Associated with: nausea, photophobia, phonophobia,  "sometimes blurry vision, no autonomic features, stiff/sore neck, sometimes congestion, sometimes dizziness     Triggers:   -- Sunlight, wears sunglasses around the house sometimes  --Hot weather  --Stress  --Neck pain     Aura/ warning: none     Medications tried:  Prevention-  Gabapentin- can only take qhs  Tizanidine  Emgality  Verapamil  Duloxetine  Baclofen  Qulipta-side effects  Lexapro  Doxepin  Bisoprolol  Vyepti     Abortive-  Rizatriptan  Fioricet  Narcotics-oxycodone  Toradol injection  Toradol p.o.  Ativan  Prednisone taper  Promethazine  Olanzapine  Compazine-worsened restless leg symptoms  Zofran     Other non-medication therapies or treatments-  -- Trigger point injections were helpful in the past  --Botox  -- [facility] chiropractic care for neck pain-helpful  -- Medical marijuana  --Massage helpful but expensive  -- Acupuncture not helpful     She used to see Dr. Mohr at [facility], 10/9/2020     Follows with a psychiatrist [facility].     Sleep concerns:  --  She states that she has poor sleeping patterns, sleeping 3-4 hours a day.  Patient has problems falling asleep and staying asleep.     Family hx of migraines:  Family hx of cerebral aneurysms:     Lifestyle:  In terms of her lifestyle patient lives alone.  She drinks only decaf coffee. She does smoke, she drinks occasionally and denies use of illicit drugs.     Diagnostics:  Brain MRI with and without contrast 8/13/2020: \"No mass or abnormal contrast enhancement. Mild degree of FLAIR hyperintensity in cerebral white matter and in the yolanda. Chronic small vessel ischemic disease is the most likely etiology. Other possible etiologies include gliosis and demyelination.\"     Cervical spine MRI without contrast 1/24/2022: \"Progression of multilevel degenerative change in the cervical spine. Disc osteophyte complex causes mild spinal cord impingement at C4-C5. Disc osteophyte complex at C5-C6 mildly abuts the ventral spinal cord. There is no " "abnormal cord signal identified. Neural foraminal narrowing at multiple levels as detailed above.\"     Most recent labs 5-23 with a BUN of 26, GFR 46, otherwise unremarkable CMP, CBC and differential with white blood cells 12.6, absolute neutrophils 8.74.     Prior documentation:  Unfortunately the patient stepped on a broken piece of glass that she could not see on her carpet.  She accidentally broke a piece of glass 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 of her art work.  She states she did not have a vacuum , it was broken.  She stepped on the glass and it went into her right heel and it is near the bone per her history.  She states she went to see the doctor and he could not remove it now she is scheduled for surgery 5/8/2023 for foreign body removal.  She denies any infectious symptoms, states it does not look red or indurated.  Not bleeding.  It is painful, so she steps on the balls of her feet so that she does not have to put pressure on the heel.  She is tolerating the pain, it is not excessive.  She states if it gets too painful she will go to the emergency room or call her doctor.     Her migraine headaches are better managed now that she had a repeat Botox injection this past March, it was on 3/28/2023.  She still gets frequent migraine headaches and we added Qulipta for this as a prevention method at the last visit.  She states after a few days of taking it at 30 mg she was getting a bit nauseous so she stopped it.  She would be agreeable to taking a half dose of that or decreasing the dose.  She did take it with food and it still made her slightly nauseous.     She continues to have specific triggers, including rainy days.  It feels like her eyes are going to pop out when it is a rainy day or when the weather changes.  She also notes cataracts bilaterally and she states she needs to get them removed, she thinks this contributes to migraines.     Otherwise no changes in character to her migraine headaches.  She uses " either Maxalt or sumatriptan nasal spray and she is happy with the effectiveness of either of these.  Sumatriptan nasal spray sometimes causes a bad taste in the mouth so she chews gum and this helps.  She does not take these to triptans together.  She does not typically use Ravenelle but has it on hand if needed, this does work too.     Prior note:   She reports 2 migraine headaches per week  She typically is triggered by stress, and sometimes physical therapy triggers her headaches.  When she works on weekends it increases her stress and headaches.     She reports that migraine headaches have made a slight increase in frequency since she had a cervical fusion status post 10/24/2022.  Right arm and shoulder pain significantly improved after the surgery thankfully.  She states her activities of stenciling which she loves to do sometimes makes her R shoulder act up.     She reports that Maxalt ODT works well to alleviate the migraine within about 20 minutes, and sometimes she needs to take a second one after 2 hours.  Toradol injection does not work well.  Ativan does help with neck tension and myofascial pain.  She took a half tab of reyvow once to try it out and it did reduce the migraine but the patient does not report sedation or any other side effects to this.     She has a tendency to get nausea and sick with a migraine and is asking for an alternative nausea medication other than Compazine and Zofran.           The following portions of the patient's history were reviewed and updated as appropriate: She  has a past medical history of Allergic rhinitis, Anxiety, Arthritis, Cancer (HCC), Chronic pain disorder, Chronic prescription opiate use, Depression, GERD (gastroesophageal reflux disease), Hypertension, Irritable bowel syndrome, Migraines, and Spinal stenosis.  She   Patient Active Problem List    Diagnosis Date Noted    Myofascial neck pain 10/06/2023    S/P cervical spinal fusion 07/25/2023    Decreased  GFR 07/25/2023    Chronic migraine without aura with status migrainosus, not intractable 05/24/2022    Cervical radiculopathy     Carpal tunnel syndrome on right     Foraminal stenosis of cervical region 10/03/2021    Degenerative disc disease, cervical 10/03/2021    Cigarette smoker 10/03/2021    Hypertension 10/03/2021    B12 deficiency 11/05/2020    Restless leg syndrome 11/05/2020    Iron deficiency 11/05/2020    Myofascial muscle pain 09/25/2020    Myofascial pain 08/10/2020    Intractable chronic migraine without aura and without status migrainosus 08/04/2020    Intractable migraine without aura 07/30/2020    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    Primary insomnia 05/07/2019    Insomnia due to other mental disorder 04/09/2019    Anxiety and depression 01/10/2019    Recurrent major depressive disorder, in partial remission (HCC) 04/25/2018     She  has a past surgical history that includes Colonoscopy; Cholecystectomy; Rotator cuff repair (Bilateral); pr blepharoplasty upper eyelid w/excessive skin (Bilateral, 01/29/2020); Replacement total knee (Right); and Cervical fusion.  Her family history includes Alzheimer's disease in her father; Breast cancer in her mother; Depression in her sister.  She  reports that she has been smoking cigarettes. She has never used smokeless tobacco. She reports current alcohol use. She reports that she does not use drugs.  Current Outpatient Medications   Medication Sig Dispense Refill    buPROPion (WELLBUTRIN XL) 300 mg 24 hr tablet Take 1 tablet (300 mg total) by mouth daily 30 tablet 5    cetirizine (ZyrTEC) 10 mg tablet Take 10 mg by mouth daily      cyanocobalamin 1,000 mcg/mL Inject once per month. 3 mL 4    cyclobenzaprine (FLEXERIL) 10 mg tablet TAKE 1 TABLET BY MOUTH EVERY 12 HOURS AS NEEDED FOR MODERATE TO SEVERE NECK PAIN/ MUSCLE SPASM 30 tablet 2    dicyclomine (BENTYL) 10 mg capsule Take 1 capsule (10 mg total) by mouth 3 (three) times a day as needed  "(stomach pain) PRN 90 capsule 0    gabapentin (NEURONTIN) 300 mg capsule TAKE 2 CAPSULES BY MOUTH qhs 180 capsule 2    ketorolac (TORADOL) 30 mg/mL injection 1 mL or 30 mg/mL IM injection p.r.n. migraine, repeat after 6 hours if needed.  Max two injections a week 4 mL 6    LORazepam (ATIVAN) 2 mg tablet Take 2 mg by mouth 3 (three) times a day as needed      losartan (COZAAR) 50 mg tablet Take 50 mg by mouth daily      oxyCODONE (OxyCONTIN) 10 mg 12 hr tablet Take 15 mg by mouth 2 (two) times a day       predniSONE 10 mg tablet Take 4 tabs x 3 days, then 3 tabs x 3 days, then 2 tabs x 3 days, then 1 tab x 3 days, then 0.5 tab x 3 days, then stop 32 tablet 0    promethazine (PHENERGAN) 25 mg tablet Take 1 tablet (25 mg total) by mouth every 6 (six) hours as needed for nausea or vomiting 90 tablet 2    rizatriptan (MAXALT-MLT) 10 mg disintegrating tablet Take 1 tablet by mouth at the onset of migraine; if symptoms continue or return, make take another dose, 1 tablet by mouth, at least 2 hours after first dose. Take no more than 2 doses in a day 12 tablet 5    rOPINIRole (REQUIP) 1 mg tablet Take 4 mg by mouth daily       sertraline (ZOLOFT) 50 mg tablet TAKE 1 TABLET BY MOUTH EVERY DAY 90 tablet 1    SUMAtriptan (IMITREX) 20 MG/ACT nasal spray Inhale 1 spray into one nostril at headsche onset, may repeat after 2 hours if needed. No more than 2 doses per day. Do not take Maxalt with this. 24 each 3    Syringe/Needle, Disp, (SYRINGE 3CC/25GX1\") 25G X 1\" 3 ML MISC Use syringes for B12 and/or toradol injections 12 each 2    Ubrogepant (UBRELVY) 100 MG tablet 1 tabs at migraine onset, repeat in 2 hours if needed. Max 2 per day. 16 tablet 11    ACETAMINOPHEN-BUTALBITAL  MG TABS 1 tab at migraine onset, repeat after 4 hours if needed. Max 2 per day and 3 per week. 20 tablet 2    azelastine (ASTELIN) 0.1 % nasal spray 2 sprays into each nostril 2 (two) times a day Use in each nostril as directed (Patient not taking: " "Reported on 3/12/2024)      lasmiditan succinate (REYVOW) 100 mg tablet Take 1/2- 1 tab qhs prn migraine. Caution sedation. Do not use more than one dose per 24 hours, or more than four doses per month. 4 tablet 6     Current Facility-Administered Medications   Medication Dose Route Frequency Provider Last Rate Last Admin    prochlorperazine (COMPAZINE) injection 5 mg  5 mg Intramuscular Q4H PRN Camille Rodriguez MD         She is allergic to metaxalone, aspirin, codeine, morphine, other, eggs or egg-derived products - food allergy, naproxen, and tapentadol..         Objective:    Blood pressure 119/71, pulse 101, height 5' 5\" (1.651 m), weight 75.3 kg (166 lb).  Body mass index is 27.62 kg/m².    Physical Exam    Neurological Exam      ROS:    Review of Systems   Constitutional:  Negative for appetite change, fatigue and fever.   HENT: Negative.  Negative for hearing loss, tinnitus, trouble swallowing and voice change.    Eyes:  Positive for photophobia. Negative for pain and visual disturbance.   Respiratory: Negative.  Negative for shortness of breath.    Cardiovascular: Negative.  Negative for palpitations.   Gastrointestinal:  Positive for nausea and vomiting.   Endocrine: Negative.  Negative for cold intolerance.   Genitourinary: Negative.  Negative for dysuria, frequency and urgency.   Musculoskeletal:  Negative for back pain, gait problem, myalgias, neck pain and neck stiffness.   Skin: Negative.  Negative for rash.   Allergic/Immunologic: Negative.    Neurological:  Positive for dizziness, light-headedness and headaches. Negative for tremors, seizures, syncope, facial asymmetry, speech difficulty, weakness and numbness.   Hematological: Negative.  Does not bruise/bleed easily.   Psychiatric/Behavioral: Negative.  Negative for confusion, hallucinations and sleep disturbance.      ROS reviewed.    "

## 2024-03-12 NOTE — PROGRESS NOTES
Universal Protocol   Consent: Verbal consent obtained. Written consent obtained.  Risks and benefits: risks, benefits and alternatives were discussed  Consent given by: patient  Patient understanding: patient states understanding of the procedure being performed  Patient consent: the patient's understanding of the procedure matches consent given  Procedure consent: procedure consent matches procedure scheduled      Chemodenervation     Date/Time  3/12/2024 8:00 AM     Performed by  Mady Brannon PA-C   Authorized by  Mady Brannon PA-C     Pre-procedure details      Prepped With: Alcohol     Procedure details      Position:  Upright   Botox      Botox Type:  Type A    Brand:  Botox    mL's of Botulinum Toxin:  200    Final Concentration per CC:  100 units    Needle Gauge:  30 G 2.5 inch   Procedures      Botox Procedures: chronic headache      Indications: migraines     Injection Location      Head / Face:  L superior trapezius, R superior trapezius, L superior cervical paraspinal, R superior cervical paraspinal, L , R , procerus, L temporalis, R temporalis, R frontalis, L frontalis, R medial occipitalis and L medial occipitalis    L  injection amount:  5 unit(s)    R  injection amount:  5 unit(s)    L lateral frontalis:  5 unit(s)    R lateral frontalis:  5 unit(s)    L medial frontalis:  5 unit(s)    R medial frontalis:  5 unit(s)    L temporalis injection amount:  20 unit(s)    R temporalis injection amount:  20 unit(s)    Procerus injection amount:  5 unit(s)    L medial occipitalis injection amount:  15 unit(s)    R medial occipitalis injection amount:  15 unit(s)    L superior cervical paraspinal injection amount:  10 unit(s)    R superior cervical paraspinal injection amount:  10 unit(s)    L superior trapezius injection amount:  15 unit(s)    R superior trapezius injection amount:  15 unit(s)   Total Units      Total units used:  200    Total units discarded:  0  "  Post-procedure details      Chemodenervation:  Chronic migraine    Facial Nerve Location::  Bilateral facial nerve    Patient tolerance of procedure:  Tolerated well, no immediate complications   Comments        Extra medically necessary:  - 20 R occipitalis (lateral)  - 15 between the right and left suboccipital regions, lateral aspects  - 10 right lateral orbicularis oculi       Blood pressure 119/71, pulse 101, height 5' 5\" (1.651 m), weight 75.3 kg (166 lb).    Pt would like to restart Vyepti.  Increase to 300 mg.    "

## 2024-03-13 ENCOUNTER — TELEPHONE (OUTPATIENT)
Dept: NEUROLOGY | Facility: CLINIC | Age: 74
End: 2024-03-13

## 2024-03-13 NOTE — TELEPHONE ENCOUNTER
Pt has medicare as primary. PA not required     Pt has [employer] (secondary)    Called [employer]ia at [phone], spoke to Eulalia. I was transferred to [phone]. On hold for >25 min.    Will call tmrw

## 2024-03-13 NOTE — TELEPHONE ENCOUNTER
----- Message from Mady Brannon PA-C sent at 3/13/2024  8:54 AM EDT -----  We need to reauth vyepti but for 300 mg this time.  Thanks!

## 2024-03-14 NOTE — TELEPHONE ENCOUNTER
"Called [phone] 2x. Received message that says-\"the person who have dialed is not able to receive calls at this time\". Then call dropped.       "

## 2024-03-14 NOTE — TELEPHONE ENCOUNTER
Per [employer]ity-  Authorization not required. Other insurer is primary. [employer] does not require preauthorization for the service requested based on Product, Group, Benefit, Diagnosis, Place of Service or Prior Auth requirements.

## 2024-03-21 NOTE — TELEPHONE ENCOUNTER
"Called [phone] 2x. Received message that says-\"the person who have dialed is not able to receive calls at this time\". Then call dropped.     Clerical team,  Unable to reach pt. Can you pls mail a letter?    "

## 2024-03-26 NOTE — TELEPHONE ENCOUNTER
Called [facility] and scheduled pt for 4/3 at 10:30am    [employer] message sent to pt    Date changed on beacon orders.

## 2024-04-02 DIAGNOSIS — G43.701 CHRONIC MIGRAINE WITHOUT AURA WITH STATUS MIGRAINOSUS, NOT INTRACTABLE: Primary | ICD-10-CM

## 2024-04-17 DIAGNOSIS — G43.719 INTRACTABLE CHRONIC MIGRAINE WITHOUT AURA AND WITHOUT STATUS MIGRAINOSUS: ICD-10-CM

## 2024-04-18 RX ORDER — KETOROLAC TROMETHAMINE 30 MG/ML
INJECTION, SOLUTION INTRAMUSCULAR; INTRAVENOUS
Qty: 4 ML | Refills: 0 | Status: SHIPPED | OUTPATIENT
Start: 2024-04-18

## 2024-05-20 ENCOUNTER — TELEPHONE (OUTPATIENT)
Dept: NEUROLOGY | Facility: CLINIC | Age: 74
End: 2024-05-20

## 2024-05-20 NOTE — TELEPHONE ENCOUNTER
Pt called in with questions regarding prochlorperazine (COMPAZINE). She thought she would be getting the next dosage up.       Pt call back# : [phone]

## 2024-05-22 NOTE — TELEPHONE ENCOUNTER
Left message for pt to call office back and to clarify the medication she is talking about and give more details.     She may be talking about increased dose of vypeti.  Per 3/13 encounter-No PA required and pt was scheduled for 4/3 but then had to cancel and was advised to contact infusion center to reschedule.   It looks like she read 3/28 [employer] message on 5/18    Again, need clarification as to what medication she is asking about

## 2024-05-24 NOTE — TELEPHONE ENCOUNTER
Called and left a message on pt's answering machine for a call back re: below    Clerical team,   Unable to reach pt. Can you pls mail a letter?    thanks

## 2024-06-20 ENCOUNTER — TELEPHONE (OUTPATIENT)
Dept: NEUROLOGY | Facility: CLINIC | Age: 74
End: 2024-06-20

## 2024-06-20 NOTE — TELEPHONE ENCOUNTER
Left message to reschedule her missed Botox appt with MK, there is availability on 6/25 in [place] for Botox.

## 2024-06-25 ENCOUNTER — PROCEDURE VISIT (OUTPATIENT)
Dept: NEUROLOGY | Facility: CLINIC | Age: 74
End: 2024-06-25
Payer: MEDICARE

## 2024-06-25 VITALS
RESPIRATION RATE: 14 BRPM | DIASTOLIC BLOOD PRESSURE: 80 MMHG | BODY MASS INDEX: 24.46 KG/M2 | WEIGHT: 146.8 LBS | TEMPERATURE: 96.8 F | SYSTOLIC BLOOD PRESSURE: 130 MMHG | HEIGHT: 65 IN

## 2024-06-25 DIAGNOSIS — F51.01 PRIMARY INSOMNIA: ICD-10-CM

## 2024-06-25 DIAGNOSIS — G43.719 INTRACTABLE CHRONIC MIGRAINE WITHOUT AURA AND WITHOUT STATUS MIGRAINOSUS: ICD-10-CM

## 2024-06-25 DIAGNOSIS — G43.701 CHRONIC MIGRAINE WITHOUT AURA WITH STATUS MIGRAINOSUS, NOT INTRACTABLE: Primary | ICD-10-CM

## 2024-06-25 PROCEDURE — 64615 CHEMODENERV MUSC MIGRAINE: CPT | Performed by: PHYSICIAN ASSISTANT

## 2024-06-25 RX ORDER — GABAPENTIN 300 MG/1
CAPSULE ORAL
Qty: 180 CAPSULE | Refills: 3 | Status: SHIPPED | OUTPATIENT
Start: 2024-06-25

## 2024-06-25 NOTE — PROGRESS NOTES
Universal Protocol   Consent: Verbal consent obtained. Written consent obtained.  Risks and benefits: risks, benefits and alternatives were discussed  Consent given by: patient  Patient understanding: patient states understanding of the procedure being performed  Patient consent: the patient's understanding of the procedure matches consent given  Procedure consent: procedure consent matches procedure scheduled      Chemodenervation     Date/Time  6/25/2024 3:00 PM     Performed by  Mady Brannon PA-C   Authorized by  Mady Brannon PA-C     Pre-procedure details      Prepped With: Alcohol     Procedure details      Position:  Upright   Botox      Botox Type:  Type A    Brand:  Botox    mL's of Botulinum Toxin:  200    Final Concentration per CC:  100 units    Needle Gauge:  30 G 2.5 inch   Procedures      Botox Procedures: chronic headache      Indications: migraines     Injection Location      Head / Face:  L superior trapezius, R superior trapezius, L superior cervical paraspinal, R superior cervical paraspinal, L , R , procerus, L temporalis, R temporalis, R frontalis, L frontalis, R medial occipitalis and L medial occipitalis    L  injection amount:  5 unit(s)    R  injection amount:  5 unit(s)    L lateral frontalis:  5 unit(s)    R lateral frontalis:  5 unit(s)    L medial frontalis:  5 unit(s)    R medial frontalis:  5 unit(s)    L temporalis injection amount:  20 unit(s)    R temporalis injection amount:  20 unit(s)    Procerus injection amount:  5 unit(s)    L medial occipitalis injection amount:  15 unit(s)    R medial occipitalis injection amount:  15 unit(s)    L superior cervical paraspinal injection amount:  10 unit(s)    R superior cervical paraspinal injection amount:  10 unit(s)    L superior trapezius injection amount:  15 unit(s)    R superior trapezius injection amount:  15 unit(s)   Total Units      Total units used:  200    Total units discarded:  0  "  Post-procedure details      Chemodenervation:  Chronic migraine    Facial Nerve Location::  Bilateral facial nerve    Patient tolerance of procedure:  Tolerated well, no immediate complications   Comments       Extra medically necessary:  - 20 R occipitalis (lateral)  - 15 between the right and left suboccipital regions, lateral aspects  - 10 right lateral orbicularis oculi       Blood pressure 130/80, temperature (!) 96.8 °F (36 °C), temperature source Temporal, resp. rate 14, height 5' 5\" (1.651 m), weight 66.6 kg (146 lb 12.8 oz).  Body mass index is 24.43 kg/m².      "

## 2024-06-28 ENCOUNTER — TELEPHONE (OUTPATIENT)
Age: 74
End: 2024-06-28

## 2024-06-28 NOTE — TELEPHONE ENCOUNTER
06/28/24    Pt called office inquiring about her Eyeglasses that she might off have left in the [facility] during her Appt 06/25/24.     Reached out to the [facility] to Transfer Pt for assistance in regards of her Glasses.      Pt TRANSFERRED.       Any questions, please contact Pt.    Thank You.

## 2024-07-04 DIAGNOSIS — G43.719 INTRACTABLE CHRONIC MIGRAINE WITHOUT AURA AND WITHOUT STATUS MIGRAINOSUS: ICD-10-CM

## 2024-07-04 DIAGNOSIS — E53.8 B12 DEFICIENCY: ICD-10-CM

## 2024-07-07 RX ORDER — CYANOCOBALAMIN 1000 UG/ML
INJECTION, SOLUTION INTRAMUSCULAR; SUBCUTANEOUS
Qty: 3 ML | Refills: 0 | Status: SHIPPED | OUTPATIENT
Start: 2024-07-07

## 2024-07-07 RX ORDER — RIZATRIPTAN BENZOATE 10 MG/1
TABLET, ORALLY DISINTEGRATING ORAL
Qty: 12 TABLET | Refills: 0 | Status: SHIPPED | OUTPATIENT
Start: 2024-07-07

## 2024-07-07 RX ORDER — KETOROLAC TROMETHAMINE 30 MG/ML
INJECTION, SOLUTION INTRAMUSCULAR; INTRAVENOUS
Qty: 4 ML | Refills: 0 | Status: SHIPPED | OUTPATIENT
Start: 2024-07-07

## 2024-07-08 ENCOUNTER — TELEPHONE (OUTPATIENT)
Age: 74
End: 2024-07-08

## 2024-07-08 ENCOUNTER — PATIENT MESSAGE (OUTPATIENT)
Dept: NEUROLOGY | Facility: CLINIC | Age: 74
End: 2024-07-08

## 2024-07-08 NOTE — TELEPHONE ENCOUNTER
Chart reviewed  Rizatriptan, toradol, b12 was sent to [facility] pharmacy yesterday.    Need to know that name of the medication that pt is requesting    Called and left a message on pt's answering machine for a call back

## 2024-07-08 NOTE — TELEPHONE ENCOUNTER
Pt called to speak with someone on DR. Brannon Team. I warm transfer to a MA .     KAROLYN Hung spoke with pt .

## 2024-07-08 NOTE — TELEPHONE ENCOUNTER
Patient called in wanting to know why her prescriptions were not sent to the pharmacy, but said they were approved to be refilled! She said to call her back at [phone].

## 2024-07-09 ENCOUNTER — PATIENT MESSAGE (OUTPATIENT)
Dept: NEUROLOGY | Facility: CLINIC | Age: 74
End: 2024-07-09

## 2024-07-09 NOTE — PATIENT COMMUNICATION
Pt called into the office. See telephone encounter dated 7/8/24. Message was addressed in 7/8 encounter

## 2024-07-09 NOTE — TELEPHONE ENCOUNTER
Inbound call received from patient. Patient stated that she tried to reach out to the office via [employer] messages and telephone. Stated that she received a message stating that pt needed to call the office to discuss what medications she needs before the provider would refill the medication.    Reviewed the chart. Informed the py that the Neurology office was reaching out to verify what medications pt was requesting to be refilled. Reviewed with pt that her Toradol, Vit B 12 injections, and Rizatriptan were sent to [facility] on 7/7/24 for pt. Pt verbalized understanding and stated she will  her medications. No further questions at this time.

## 2024-07-12 ENCOUNTER — TELEPHONE (OUTPATIENT)
Dept: NEUROLOGY | Facility: CLINIC | Age: 74
End: 2024-07-12

## 2024-07-16 DIAGNOSIS — G43.701 CHRONIC MIGRAINE WITHOUT AURA WITH STATUS MIGRAINOSUS, NOT INTRACTABLE: ICD-10-CM

## 2024-07-16 NOTE — TELEPHONE ENCOUNTER
Per [facility]-  Approved on July 12  Your request has been approved  Authorization Expiration Date: 12/31/2024    Approval letter scanned in media    Called [facility] pharmacy and left a detailed message on their  answering machine and informed of the approval and for a call back if any questions.

## 2024-07-23 ENCOUNTER — TELEPHONE (OUTPATIENT)
Dept: NEUROLOGY | Facility: CLINIC | Age: 74
End: 2024-07-23

## 2024-07-23 RX ORDER — UBROGEPANT 100 MG/1
TABLET ORAL
Qty: 10 TABLET | Refills: 0 | Status: SHIPPED | OUTPATIENT
Start: 2024-07-23

## 2024-07-25 NOTE — TELEPHONE ENCOUNTER
Serg approved per CM  Approved on July 23  Your request has been approved  Authorization Expiration Date: 12/31/2024    Left message for pharm making them aware of approval    Mychart message sent to pt

## 2024-07-29 DIAGNOSIS — G43.701 CHRONIC MIGRAINE WITHOUT AURA WITH STATUS MIGRAINOSUS, NOT INTRACTABLE: Primary | ICD-10-CM

## 2024-07-29 RX ORDER — SODIUM CHLORIDE 9 MG/ML
20 INJECTION, SOLUTION INTRAVENOUS ONCE
Status: CANCELLED | OUTPATIENT
Start: 2024-08-05

## 2024-07-31 ENCOUNTER — TELEPHONE (OUTPATIENT)
Dept: NEUROLOGY | Facility: CLINIC | Age: 74
End: 2024-07-31

## 2024-08-01 DIAGNOSIS — G43.719 INTRACTABLE CHRONIC MIGRAINE WITHOUT AURA AND WITHOUT STATUS MIGRAINOSUS: ICD-10-CM

## 2024-08-01 NOTE — TELEPHONE ENCOUNTER
MK - patient has not had vyepti since 10/2023. Okay to resume with 300mg dose? Or should patient return to 100mg dose?

## 2024-08-02 NOTE — TELEPHONE ENCOUNTER
Call placed to [facility]. Patient scheduled for next available morning appointment as requested - 8/19/2024 8:30pm.     Therapy plan updated with dates and sent to provider for signature.       c/c: confusion  HPI:  66 y/o F HTN, dementia, depression, and MVP who was referred to the ED by her son for evaluation of increased confusion, decreased PO intake, poor living conditions, hoarding, and inability to care for herself. In the ED Psychiatry was consulted for further recommendations. The patient's son states that the patient has not sought medical care for over 1 year and that his mother has been living on "just candy and nuts." In the ED Psychiatry evaluated the patient and felt no need for inpatient psychiatry admission. She was admitted to hospitalist service. Neurology was consulted. MRI brain has been ordered.     2/25: pt seen and examined this am. Felt ok. NO complaints. Confused.   2/26 confused  2/27 no acute events    Review of system- All 10 systems reviewed and is as per HPI otherwise negative.     Vital Signs Last 24 Hrs  T(C): 36.8 (27 Feb 2018 11:45), Max: 36.8 (27 Feb 2018 11:45)  T(F): 98.2 (27 Feb 2018 11:45), Max: 98.2 (27 Feb 2018 11:45)  HR: 63 (27 Feb 2018 11:45) (63 - 65)  BP: 129/68 (27 Feb 2018 11:45) (129/68 - 141/81)  BP(mean): --  RR: 16 (27 Feb 2018 11:45) (16 - 16)  SpO2: 100% (27 Feb 2018 11:45) (100% - 100%)    PHYSICAL EXAM:  GENERAL: Comfortable, no acute distress  HEAD:  Atraumatic, Normocephalic  EYES: EOMI, PERRLA  HEENT: Moist mucous membranes  NECK: Supple, No JVD  NERVOUS SYSTEM:  confused, ambulates independantly  CHEST/LUNG: Clear to auscultation bilaterally  HEART: Regular rate and rhythm; No murmurs, rubs, or gallops  ABDOMEN: Soft, Nontender, Nondistended; Bowel sounds present  GENITOURINARY- Voiding, no palpable bladder  EXTREMITIES:  No clubbing, cyanosis, or edema  MUSCULOSKELTAL- No muscle tenderness, No joint tenderness  SKIN-no rash    LABS:  25 Feb 2018 06:23    142    |  107    |  12     ----------------------------<  89     3.8     |  29     |  0.73     Ca    8.6        25 Feb 2018 06:23  Phos  3.4       25 Feb 2018 06:23  Mg     1.9       25 Feb 2018 06:23    MEDS  acetaminophen   Tablet 650 milliGRAM(s) Oral every 6 hours PRN  acetaminophen   Tablet. 650 milliGRAM(s) Oral every 6 hours PRN  docusate sodium 100 milliGRAM(s) Oral three times a day PRN  ondansetron Injectable 4 milliGRAM(s) IV Push every 6 hours PRN  QUEtiapine 12.5 milliGRAM(s) Oral every 12 hours  QUEtiapine 12.5 milliGRAM(s) Oral two times a day PRN  senna 2 Tablet(s) Oral at bedtime PRN    ASSESSMENT AND PLAN:  #Dementia with periods of agitation:  -psych eval noted, no need for inpatient psych admission  -continue seroquel as ordered  -neuro eval appreciated, f/u b12/mri brain  -rpr neg  -Placement    #Malnutrition:  -nutrition consulted    #Hypokalemia:  -repleted    #Dispo- long-term placement. Keep on 1:1 for safety and risk of elopement

## 2024-08-05 DIAGNOSIS — G43.719 INTRACTABLE CHRONIC MIGRAINE WITHOUT AURA AND WITHOUT STATUS MIGRAINOSUS: ICD-10-CM

## 2024-08-06 DIAGNOSIS — G43.701 CHRONIC MIGRAINE WITHOUT AURA WITH STATUS MIGRAINOSUS, NOT INTRACTABLE: ICD-10-CM

## 2024-08-06 RX ORDER — RIZATRIPTAN BENZOATE 10 MG/1
TABLET, ORALLY DISINTEGRATING ORAL
Qty: 12 TABLET | Refills: 5 | Status: SHIPPED | OUTPATIENT
Start: 2024-08-06

## 2024-08-08 DIAGNOSIS — E53.8 B12 DEFICIENCY: ICD-10-CM

## 2024-08-09 DIAGNOSIS — G43.701 CHRONIC MIGRAINE WITHOUT AURA WITH STATUS MIGRAINOSUS, NOT INTRACTABLE: Primary | ICD-10-CM

## 2024-08-09 RX ORDER — KETOROLAC TROMETHAMINE 30 MG/ML
INJECTION, SOLUTION INTRAMUSCULAR; INTRAVENOUS
Qty: 4 ML | Refills: 1 | Status: SHIPPED | OUTPATIENT
Start: 2024-08-09

## 2024-08-09 RX ORDER — CYANOCOBALAMIN 1000 UG/ML
INJECTION, SOLUTION INTRAMUSCULAR; SUBCUTANEOUS
Qty: 3 ML | Refills: 0 | OUTPATIENT
Start: 2024-08-09

## 2024-08-09 RX ORDER — SODIUM CHLORIDE 9 MG/ML
20 INJECTION, SOLUTION INTRAVENOUS ONCE
OUTPATIENT
Start: 2024-08-19

## 2024-08-09 RX ORDER — UBROGEPANT 100 MG/1
TABLET ORAL
Qty: 16 TABLET | Refills: 0 | Status: SHIPPED | OUTPATIENT
Start: 2024-08-09

## 2024-08-16 ENCOUNTER — TELEPHONE (OUTPATIENT)
Age: 74
End: 2024-08-16

## 2024-08-16 NOTE — TELEPHONE ENCOUNTER
08/16/24    Patient called office requesting to speak to Miss. Brannon Clinical Staff due that she got a message to contact office and clarify a medication that can't be released without speaking to her. (PATIENT STATED).    Per reviewing patient chart did not see any message that can be related to the reason of patient call but did offer to send a message to our clinical team.    Patient accepted.    Patient is requesting a call back, and if she doesn't  to leave a detailed message that she gives the ok. And if is possible to send her a message through my chart because she can receive my chart messages faster then on her phone, due that she is having trouble wit her phone.     Asked patient if she knew what medication it was, patient stated that she wasn't to sure what medication she needed clarification for, but that she thinks it might be the toradol injection.      Please contact patient for clarification.  Thank you.

## 2024-08-19 NOTE — TELEPHONE ENCOUNTER
"Pt called in to let us know that she tried to make it to the infusion today, but she will not be able to make it r/t transportation. She stated the bus wasn't paid for. She is not concerned with setting up another infusion just yet as she states she is dealing with some other issues with her back and hip that she will need to deal with first. She stated that we can reach out to her later to set up another infusion.    She also asked about \"The issue with the meds\" that someone from our office contacted her about last week and then again this morning. Read FARRAH Rahman's note from Friday 8/16, where she called for the same reason, about a med issue that someone from our office had left her message to call the office about. This writer could also not find any documentation about a medication issue in the chart. She did say that it might have to do with Toradol, but again, nothing documented in the chart. Could possibly be an issue with a PA that the pharmacy called her about? With Ubrelvy or Toradol? There was previously a request for a PA for Ubrelvy from 7/12 under Media tab.    She asked that someone please reach out to ANIYA Brannon to see what she was talking about with the medications and please get back to her with that information.    Mady Borrego - did you reach out to the pt at all?    Gila ROMERO re: infusions.  "

## 2024-08-19 NOTE — TELEPHONE ENCOUNTER
Chart reviewed  Pt has an appt w/ [facility] this morning at 0830  EOD Status: No Show     Not sure if [facility] was the one who called her    Called and left a message on pt's answering machine for a call back    If pt calls back, pls ask her to call [facility] center    thanks

## 2024-08-20 NOTE — TELEPHONE ENCOUNTER
Pt calling back. Chart reviewed.    Advised her of MK's response.     Pt states that she received a call from our office last week. Informed pt that there is no indication that our office called. However, we do see that she called us.     Pt states that she was able to  the Ubrelvy and the Toradol from the pharmacy. However, she says usually the Toradol injection gives off a painful and burning sensation when injected. Pt did not get this reaction with her most recent bottle of Toradol. Pt says pharmacy delivers her medications so when she informed the pharmacy about this, they agreed to  the vial of Toradol so they can send it back to the . Pt says this was 2 weeks ago and she still hasn't heard back from them.    Advised pt to call pharmacy for update on Toradol. Advised pt that there is a current script on file and to let us know if anything needed from our office.    Pt to follow up with pharmacy.    Addressing Vyepti infusion in Vyepti encounter 7/31/2024.

## 2024-09-10 DIAGNOSIS — G43.719 INTRACTABLE CHRONIC MIGRAINE WITHOUT AURA AND WITHOUT STATUS MIGRAINOSUS: ICD-10-CM

## 2024-09-10 RX ORDER — KETOROLAC TROMETHAMINE 30 MG/ML
INJECTION, SOLUTION INTRAMUSCULAR; INTRAVENOUS
Qty: 4 ML | Refills: 1 | Status: SHIPPED | OUTPATIENT
Start: 2024-09-10

## 2024-09-27 ENCOUNTER — EVALUATION (OUTPATIENT)
Dept: PHYSICAL THERAPY | Facility: REHABILITATION | Age: 74
End: 2024-09-27
Payer: MEDICARE

## 2024-09-27 DIAGNOSIS — Z98.1 S/P LUMBAR FUSION: Primary | ICD-10-CM

## 2024-09-27 NOTE — PROGRESS NOTES
PT Evaluation     Today's date: 2024  Patient name: Jovita Bauman  : 1950  MRN: [id number]  Referring provider: Rodri Yanez MD  Dx:   Encounter Diagnosis     ICD-10-CM    1. S/P lumbar fusion  Z98.1           Start Time: 945  Stop Time: 1030  Total time in clinic (min): 45 minutes    Assessment  Impairments: abnormal muscle firing, abnormal or restricted ROM, abnormal movement, activity intolerance, impaired physical strength, pain with function, poor body mechanics and activity limitations    Assessment details: Patient is a 73 y.o. female presenting s/p L3-S1 fusion with date of surgery 24. Resulting postoperative impairments include ***. As a result of impairments patient experiences limitations with functional/daily activities including ***. Precautions, surgical protocol, and signs/symptoms of infection were reviewed with patient who expressed verbal understanding. Patient has the above listed impairments and will benefit from skilled PT to improve deficits to return to prior level of function.       Prognosis: good    Goals  Impairment Goals: 4-6 weeks  - Patient to decrease pain to ***/10  - Patient to improve ***    Functional Goals: by discharge  - Patient to discharge to independent HEP  - Patient to return to prior level of function  - Patient to improve tolerance to ***   - Patient to improve tolerance to ***     Plan  Patient would benefit from: skilled physical therapy  Planned modality interventions: cryotherapy, TENS and thermotherapy: hydrocollator packs    Planned therapy interventions: flexibility, home exercise program, joint mobilization, manual therapy, neuromuscular re-education, patient education, strengthening, stretching, therapeutic activities, therapeutic exercise and functional ROM exercises    Treatment plan discussed with: patient      Subjective Evaluation    History of Present Illness  Mechanism of injury: HISTORY OF PRESENT ILLNESS: Patient presents  s/p L3-S1 fusion 9/5/24.  PRIOR TREATMENT:  AGGRAVATING FACTORS:    EASING FACTORS:  WORK:   FUNCTIONAL LIMITATIONS:  SUBJECTIVE FUNCTIONAL LEVEL:  PATIENT GOAL:         Objective           Diagnosis: s/p L3-S1 fusion 9/5/24   Precautions: hx cancer, HTN   Primary impairments: ***   *asterisks by exercise = given for HEP    9/27       Manuals                                                There Ex        Rec bike        LTR        SKTC        3-way P-ball flexion                                                Neuro Re-Ed        Supine core brace        Supine core brace with alt marches/BKFO                                                                                        Re-evaluation             Ther Act/Gait                                         Modalities

## 2024-09-30 ENCOUNTER — EVALUATION (OUTPATIENT)
Dept: PHYSICAL THERAPY | Facility: REHABILITATION | Age: 74
End: 2024-09-30
Payer: MEDICARE

## 2024-09-30 DIAGNOSIS — Z98.1 S/P LUMBAR FUSION: Primary | ICD-10-CM

## 2024-09-30 PROCEDURE — 97161 PT EVAL LOW COMPLEX 20 MIN: CPT | Performed by: PHYSICAL THERAPIST

## 2024-09-30 PROCEDURE — 97110 THERAPEUTIC EXERCISES: CPT | Performed by: PHYSICAL THERAPIST

## 2024-09-30 NOTE — PROGRESS NOTES
PT Evaluation     Today's date: 2024  Patient name: Jovita Bauman  : 1950  MRN: [id number]  Referring provider: Rodri Yanez MD  Dx:   Encounter Diagnosis     ICD-10-CM    1. S/P lumbar fusion  Z98.1           Start Time: 1625  Stop Time: 1728  Total time in clinic (min): 63 minutes    Assessment  Impairments: abnormal muscle firing, abnormal or restricted ROM, abnormal movement, activity intolerance, impaired physical strength, pain with function, poor body mechanics and activity limitations    Assessment details: Patient is a 73 y.o. female presenting s/p L3-S1 fusion with date of surgery 24. Resulting postoperative impairments include core stability deficits and generalized hip strength deficits. As a result of impairments patient experiences limitations with functional/daily activities including standing and walking > 10-15 minutes, unable to work, lifting heavy objects, limited general activity level, and sleep quality. Precautions, surgical protocol, and signs/symptoms of infection were reviewed with patient who expressed verbal understanding. Patient has the above listed impairments and will benefit from skilled PT to improve deficits to return to prior level of function.       Prognosis: good    Goals  Impairment Goals: 4-6 weeks  - Patient to decrease pain to 0/10  - Patient to increase hip strength to 4+/5 throughout  - Patient to demonstrate proper core activation and body mechanics during functional activities    Functional Goals: by discharge  - Patient to discharge to independent [facility]  - Patient to return to prior level of function  - Patient to improve standing and walking tolerance to 20 minutes   - Patient to improve tolerance to lifting  - Patient to return to work     Plan  Patient would benefit from: skilled physical therapy  Planned modality interventions: cryotherapy, TENS and thermotherapy: hydrocollator packs    Planned therapy interventions: flexibility, home  exercise program, joint mobilization, manual therapy, neuromuscular re-education, patient education, strengthening, stretching, therapeutic activities, therapeutic exercise and functional ROM exercises    Frequency: 2x week  Duration in weeks: 6  Treatment plan discussed with: patient      Subjective Evaluation    History of Present Illness  Mechanism of injury: HISTORY OF PRESENT ILLNESS: Patient presents s/p L3-S1 fusion 24. She spent one week at [facility] for acute rehab and was discharged home 24. She has been generally taking it easy since she got home and she has back pain following increased activity level. She is ambulating without AD. No radicular pain, sensory changes, or B/B changes. She had presented to the ED last week for abdominal pain however no cause has been identified. 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)  FUNCTIONAL LIMITATIONS: standing and walking > 10-15 minutes, unable to work, lifting heavy objects, limited general activity level, and sleep quality  SUBJECTIVE FUNCTIONAL LEVEL: 60%  PATIENT GOAL: I want to get back to %  Pain  Current pain ratin  At best pain rating: 3  At worst pain rating: 10  Location: Low back        Objective     Neurological Testing     Sensation     Lumbar   Left   Intact: light touch    Right   Intact: light touch    Reflexes   Left   Patellar (L4): normal (2+)  Achilles (S1): trace (1+)    Right   Patellar (L4): normal (2+)  Achilles (S1): trace (1+)    Active Range of Motion     Lumbar   Flexion:  Restriction level: minimal  Extension:  Restriction level: moderate  Left lateral flexion:  Restriction level: minimal  Right lateral flexion:  Restriction level: minimal  Left rotation:  Restriction level: minimal  Right rotation:  Restriction level: minimal    Additional  "Active Range of Motion Details  Mild soreness at end-ranges    Passive Range of Motion   Left Hip   Flexion: WFL  External rotation (90/90): WFL  Internal rotation (90/90): WFL    Right Hip   Flexion: WFL  External rotation (90/90): WFL  Internal rotation (90/90): WFL    Strength/Myotome Testing     Left Hip   Planes of Motion   Flexion: 3+  Abduction: 3+    Right Hip   Planes of Motion   Flexion: 3+  Abduction: 3+    Left Knee   Extension: 4    Right Knee   Extension: 4    Left Ankle/Foot   Dorsiflexion: 4+  Eversion: 5  Great toe extension: 4+    Right Ankle/Foot   Dorsiflexion: 4+  Eversion: 5  Great toe extension: 4+    Additional Strength Details  R SLR: 3+  L SLR: 3+    Tests     Lumbar     Left   Negative passive SLR and slump test.     Right   Negative passive SLR and slump test.       Flowsheet Rows      Flowsheet Row Most Recent Value   PT/OT G-Codes    Current Score 48   Projected Score 54               Diagnosis: s/p L3-S1 fusion 9/5/24   Precautions: hx cancer, HTN   Primary impairments: core stability deficits and generalized hip strength deficits   *asterisks by exercise = given for HEP     9/30           Manuals                                                                                   There Ex             Rec bike             LTR *   5\" x 10           SKTC *   10\" x 5           3-way P-ball flexion                                                                                   Neuro Re-Ed             Supine core brace *  5\" x 10           Supine core brace with alt marches/BKFO             Dead bug isometric        Supine SLR             S/L hip abd             Sit to stand             U/L leg press                                                                                                 Re-evaluation             Ther Act/Gait                                         Modalities                                                              "

## 2024-09-30 NOTE — LETTER
2024    Rodri Yanez MD  [address] [facility] [address].  [address]  [facility] [address]    Patient: Jovita Bauman   YOB: 1950   Date of Visit: 2024     Encounter Diagnosis     ICD-10-CM    1. S/P lumbar fusion  Z98.1           Dear Dr. Yanez:    Thank you for your recent referral of Jovita Bauman. Please review the attached evaluation summary from Jovita's recent visit.     Please verify that you agree with the plan of care by signing the attached order.     If you have any questions or concerns, please do not hesitate to call.     I sincerely appreciate the opportunity to share in the care of one of your patients and hope to have another opportunity to work with you in the near future.       Sincerely,    Chuy Montez, PT      Referring Provider:      I certify that I have read the below Plan of Care and certify the need for these services furnished under this plan of treatment while under my care.                    Rodri Yanez MD  [address]  [address]  [facility] [address]  Via Fax: [phone]          PT Evaluation     Today's date: 2024  Patient name: Jovita Bauman  : 1950  MRN: [id number]  Referring provider: Rodri Yanez MD  Dx:   Encounter Diagnosis     ICD-10-CM    1. S/P lumbar fusion  Z98.1           Start Time: 1625  Stop Time: 1728  Total time in clinic (min): 63 minutes    Assessment  Impairments: abnormal muscle firing, abnormal or restricted ROM, abnormal movement, activity intolerance, impaired physical strength, pain with function, poor body mechanics and activity limitations    Assessment details: Patient is a 73 y.o. female presenting s/p L3-S1 fusion with date of surgery 24. Resulting postoperative impairments include core stability deficits and generalized hip strength deficits. As a result of impairments patient experiences limitations with functional/daily activities including standing and  walking > 10-15 minutes, unable to work, lifting heavy objects, limited general activity level, and sleep quality. Precautions, surgical protocol, and signs/symptoms of infection were reviewed with patient who expressed verbal understanding. Patient has the above listed impairments and will benefit from skilled PT to improve deficits to return to prior level of function.       Prognosis: good    Goals  Impairment Goals: 4-6 weeks  - Patient to decrease pain to 0/10  - Patient to increase hip strength to 4+/5 throughout  - Patient to demonstrate proper core activation and body mechanics during functional activities    Functional Goals: by discharge  - Patient to discharge to independent [facility]  - Patient to return to prior level of function  - Patient to improve standing and walking tolerance to 20 minutes   - Patient to improve tolerance to lifting  - Patient to return to work     Plan  Patient would benefit from: skilled physical therapy  Planned modality interventions: cryotherapy, TENS and thermotherapy: hydrocollator packs    Planned therapy interventions: flexibility, home exercise program, joint mobilization, manual therapy, neuromuscular re-education, patient education, strengthening, stretching, therapeutic activities, therapeutic exercise and functional ROM exercises    Frequency: 2x week  Duration in weeks: 6  Treatment plan discussed with: patient      Subjective Evaluation    History of Present Illness  Mechanism of injury: HISTORY OF PRESENT ILLNESS: Patient presents s/p L3-S1 fusion 9/5/24. She spent one week at [facility] for acute rehab and was discharged home 9/11/24. She has been generally taking it easy since she got home and she has back pain following increased activity level. She is ambulating without AD. No radicular pain, sensory changes, or B/B changes. She had presented to the ED last week for abdominal pain however no cause has been identified. 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)  FUNCTIONAL LIMITATIONS: standing and walking > 10-15 minutes, unable to work, lifting heavy objects, limited general activity level, and sleep quality  SUBJECTIVE FUNCTIONAL LEVEL: 60%  PATIENT GOAL: I want to get back to %  Pain  Current pain ratin  At best pain rating: 3  At worst pain rating: 10  Location: Low back        Objective     Neurological Testing     Sensation     Lumbar   Left   Intact: light touch    Right   Intact: light touch    Reflexes   Left   Patellar (L4): normal (2+)  Achilles (S1): trace (1+)    Right   Patellar (L4): normal (2+)  Achilles (S1): trace (1+)    Active Range of Motion     Lumbar   Flexion:  Restriction level: minimal  Extension:  Restriction level: moderate  Left lateral flexion:  Restriction level: minimal  Right lateral flexion:  Restriction level: minimal  Left rotation:  Restriction level: minimal  Right rotation:  Restriction level: minimal    Additional Active Range of Motion Details  Mild soreness at end-ranges    Passive Range of Motion   Left Hip   Flexion: WFL  External rotation (90/90): WFL  Internal rotation (90/90): WFL    Right Hip   Flexion: WFL  External rotation (90/90): WFL  Internal rotation (90/90): WFL    Strength/Myotome Testing     Left Hip   Planes of Motion   Flexion: 3+  Abduction: 3+    Right Hip   Planes of Motion   Flexion: 3+  Abduction: 3+    Left Knee   Extension: 4    Right Knee   Extension: 4    Left Ankle/Foot   Dorsiflexion: 4+  Eversion: 5  Great toe extension: 4+    Right Ankle/Foot   Dorsiflexion: 4+  Eversion: 5  Great toe extension: 4+    Additional Strength Details  R SLR: 3+  L SLR: 3+    Tests     Lumbar     Left   Negative passive SLR and slump test.     Right   Negative passive SLR and slump test.       Flowsheet Rows      Flowsheet Row Most Recent Value  "  PT/OT G-Codes    Current Score 48   Projected Score 54               Diagnosis: s/p L3-S1 fusion 9/5/24   Precautions: hx cancer, HTN   Primary impairments: core stability deficits and generalized hip strength deficits   *asterisks by exercise = given for HEP     9/30           Manuals                                                                                   There Ex             Rec bike             LTR *   5\" x 10           SKTC *   10\" x 5           3-way P-ball flexion                                                                                   Neuro Re-Ed             Supine core brace *  5\" x 10           Supine core brace with alt marches/BKFO             Dead bug isometric        Supine SLR             S/L hip abd             Sit to stand             U/L leg press                                                                                                 Re-evaluation             Ther Act/Gait                                         Modalities                                                                               "

## 2024-10-09 ENCOUNTER — APPOINTMENT (OUTPATIENT)
Dept: PHYSICAL THERAPY | Facility: REHABILITATION | Age: 74
End: 2024-10-09
Payer: MEDICARE

## 2024-10-10 ENCOUNTER — APPOINTMENT (OUTPATIENT)
Dept: PHYSICAL THERAPY | Facility: REHABILITATION | Age: 74
End: 2024-10-10
Payer: MEDICARE

## 2024-10-14 ENCOUNTER — APPOINTMENT (OUTPATIENT)
Dept: PHYSICAL THERAPY | Facility: REHABILITATION | Age: 74
End: 2024-10-14
Payer: MEDICARE

## 2024-10-17 ENCOUNTER — APPOINTMENT (OUTPATIENT)
Dept: PHYSICAL THERAPY | Facility: REHABILITATION | Age: 74
End: 2024-10-17
Payer: MEDICARE

## 2024-10-21 ENCOUNTER — APPOINTMENT (OUTPATIENT)
Dept: PHYSICAL THERAPY | Facility: REHABILITATION | Age: 74
End: 2024-10-21
Payer: MEDICARE

## 2024-10-21 DIAGNOSIS — E53.8 B12 DEFICIENCY: ICD-10-CM

## 2024-10-21 DIAGNOSIS — G43.701 CHRONIC MIGRAINE WITHOUT AURA WITH STATUS MIGRAINOSUS, NOT INTRACTABLE: ICD-10-CM

## 2024-10-21 RX ORDER — SYRINGE W-NEEDLE,DISPOSAB,3 ML 25GX5/8"
SYRINGE, EMPTY DISPOSABLE MISCELLANEOUS
Qty: 12 EACH | Refills: 5 | Status: SHIPPED | OUTPATIENT
Start: 2024-10-21

## 2024-10-21 NOTE — TELEPHONE ENCOUNTER
"Reason for call:   [x] Refill   [] Prior Auth  [] Other:     Office:   [] PCP/Provider -   [x] Specialty/Provider - Neuro    Medication:  Syringe/Needle, Disp, (SYRINGE 3CC/25GX1\") 25G X 1\" 3 ML MISC    Dose/Frequency: Use syringes for B12 and/or toradol injections     Quantity: 1 each    Pharmacy: [facility] - [facility] Arell[facility]  [address] [facility]      Does the patient have enough for 3 days?   [] Yes   [x] No - Send as HP to POD      " HYDROcodone-acetaminophen (NORCO) 7.5-325 MG per tablet    This pt preferred pharmacy has been verified and set up.

## 2024-10-23 DIAGNOSIS — G43.719 INTRACTABLE CHRONIC MIGRAINE WITHOUT AURA AND WITHOUT STATUS MIGRAINOSUS: ICD-10-CM

## 2024-10-23 RX ORDER — KETOROLAC TROMETHAMINE 30 MG/ML
INJECTION, SOLUTION INTRAMUSCULAR; INTRAVENOUS
Qty: 4 ML | Refills: 1 | Status: SHIPPED | OUTPATIENT
Start: 2024-10-23

## 2024-10-24 ENCOUNTER — APPOINTMENT (OUTPATIENT)
Dept: PHYSICAL THERAPY | Facility: REHABILITATION | Age: 74
End: 2024-10-24
Payer: MEDICARE

## 2024-10-29 ENCOUNTER — EVALUATION (OUTPATIENT)
Dept: PHYSICAL THERAPY | Facility: REHABILITATION | Age: 74
End: 2024-10-29
Payer: MEDICARE

## 2024-10-29 DIAGNOSIS — Z98.1 S/P LUMBAR FUSION: Primary | ICD-10-CM

## 2024-10-29 PROCEDURE — 97112 NEUROMUSCULAR REEDUCATION: CPT | Performed by: PHYSICAL THERAPIST

## 2024-10-29 NOTE — PROGRESS NOTES
PT Re-Evaluation     Today's date: 10/29/2024  Patient name: Jovita Bauman  : 1950  MRN: [id number]  Referring provider: Rodri Yanez MD  Dx:   Encounter Diagnosis     ICD-10-CM    1. S/P lumbar fusion  Z98.1           Start Time: 1115  Stop Time: 1200  Total time in clinic (min): 45 minutes    Assessment  Impairments: abnormal muscle firing, abnormal or restricted ROM, abnormal movement, activity intolerance, impaired physical strength, pain with function, poor body mechanics and activity limitations    Assessment details: Patient is a 73 y.o. female presenting s/p L3-S1 fusion with date of surgery 24. Patient presents to reexamination to reestablish care as she has been unable to make appointments since her initial examination. Resulting postoperative impairments include core stability deficits and generalized hip strength deficits. As a result of impairments patient experiences limitations with functional/daily activities including standing and walking > 10-15 minutes, unable to work, lifting heavy objects, limited general activity level, and sleep quality. Patient has the above listed impairments and will benefit from skilled PT to improve deficits to return to prior level of function.       Prognosis: good    Goals  Impairment Goals: 4-6 weeks  - Patient to decrease pain to 0/10  - Patient to increase hip strength to 4+/5 throughout  - Patient to demonstrate proper core activation and body mechanics during functional activities    Functional Goals: by discharge  - Patient to discharge to independent [facility]  - Patient to return to prior level of function  - Patient to improve standing and walking tolerance to 20 minutes   - Patient to improve tolerance to lifting  - Patient to return to work     Plan  Patient would benefit from: skilled physical therapy  Planned modality interventions: cryotherapy, TENS and thermotherapy: hydrocollator packs    Planned therapy interventions: flexibility,  home exercise program, joint mobilization, manual therapy, neuromuscular re-education, patient education, strengthening, stretching, therapeutic activities, therapeutic exercise and functional ROM exercises    Frequency: 1x week  Duration in weeks: 6  Treatment plan discussed with: patient        Subjective Evaluation    History of Present Illness  Mechanism of injury: HISTORY OF PRESENT ILLNESS: Patient presents s/p L3-S1 fusion 24. She presents to reexamination to reestablish care following 4-week gap since initial examination. Patient sustained a fall last week and was unable to take the bus to her appointment due to back soreness. No radicular pain, sensory changes, or B/B changes. She notes back pain that increased since her fall however she has tried to keep up with walking. She is walking about 15-20 minutes per day.   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  FUNCTIONAL LIMITATIONS: standing and walking > 15-20 minutes, unable to work, lifting heavy objects, limited general activity level, and sleep quality  SUBJECTIVE FUNCTIONAL LEVEL: 70-75%  PATIENT GOAL: I want to get back to %  Pain  Current pain ratin  At best pain ratin  At worst pain rating: 10  Location: Low back          Objective     Neurological Testing     Sensation     Lumbar   Left   Intact: light touch    Right   Intact: light touch    Reflexes   Left   Patellar (L4): normal (2+)  Achilles (S1): trace (1+)    Right   Patellar (L4): trace (1+)  Achilles (S1): trace (1+)    Active Range of Motion     Lumbar   Flexion:  WFL  Extension:  Restriction level: minimal  Left lateral flexion:  WFL  Right lateral flexion:  WFL  Left rotation:  Restriction level: minimal  Right rotation:  Restriction level: minimal    Additional Active Range of Motion Details  Mild soreness at end-ranges    Passive Range of  "Motion   Left Hip   Flexion: WFL  External rotation (90/90): WFL  Internal rotation (90/90): WFL    Right Hip   Flexion: WFL  External rotation (90/90): WFL  Internal rotation (90/90): WFL    Strength/Myotome Testing     Left Hip   Planes of Motion   Flexion: 4-  Abduction: 3+    Right Hip   Planes of Motion   Flexion: 4-  Abduction: 3+    Left Knee   Extension: 4+    Right Knee   Extension: 4+    Left Ankle/Foot   Dorsiflexion: 5  Eversion: 5  Great toe extension: 4    Right Ankle/Foot   Dorsiflexion: 5  Eversion: 5  Great toe extension: 4    Additional Strength Details  R SLR: 3+  L SLR: 3+    Tests     Lumbar     Left   Negative passive SLR and slump test.     Right   Negative passive SLR and slump test.                Diagnosis: s/p L3-S1 fusion 9/5/24   Precautions: hx cancer, HTN   Primary impairments: core stability deficits and generalized hip strength deficits   *asterisks by exercise = given for HEP     9/30 10/29         Manuals                                                                                   There Ex             Rec bike             LTR *   5\" x 10           SKTC *   10\" x 5           3-way P-ball flexion                                                                                   Neuro Re-Ed             Supine core brace *  5\" x 10  5\" x 10         Supine core brace with alt marches/BKFO             Dead bug isometric        Supine SLR *    x 15 B         Band hip abd slides   Red x 20 B         Sit to stand chair with foam    X 10         U/L leg press                                                                                                 Re-evaluation    CM         Ther Act/Gait                                         Modalities                                                              "

## 2024-10-29 NOTE — LETTER
2024    Rodri Yanez MD  [address] [facility] [address] [facility] [address]  [address]  [facility] [address]    Patient: Jovita Bauman   YOB: 1950   Date of Visit: 10/29/2024     Encounter Diagnosis     ICD-10-CM    1. S/P lumbar fusion  Z98.1           Dear Dr. Yanez:    Thank you for your recent referral of Jovita Bauman. Please review the attached evaluation summary from Jovita's recent visit.     Please verify that you agree with the plan of care by signing the attached order.     If you have any questions or concerns, please do not hesitate to call.     I sincerely appreciate the opportunity to share in the care of one of your patients and hope to have another opportunity to work with you in the near future.       Sincerely,    Chuy Montez, PT      Referring Provider:      I certify that I have read the below Plan of Care and certify the need for these services furnished under this plan of treatment while under my care.                    Rodri Yanez MD  [address]  [address]  [facility] [address]  Via Fax: [phone]          PT Re-Evaluation     Today's date: 10/29/2024  Patient name: Jovita Bauman  : 1950  MRN: [id number]  Referring provider: Rodri Yanez MD  Dx:   Encounter Diagnosis     ICD-10-CM    1. S/P lumbar fusion  Z98.1           Start Time: 1115  Stop Time: 1200  Total time in clinic (min): 45 minutes    Assessment  Impairments: abnormal muscle firing, abnormal or restricted ROM, abnormal movement, activity intolerance, impaired physical strength, pain with function, poor body mechanics and activity limitations    Assessment details: Patient is a 73 y.o. female presenting s/p L3-S1 fusion with date of surgery 24. Patient presents to reexamination to reestablish care as she has been unable to make appointments since her initial examination. Resulting postoperative impairments include core stability deficits and generalized hip  strength deficits. As a result of impairments patient experiences limitations with functional/daily activities including standing and walking > 10-15 minutes, unable to work, lifting heavy objects, limited general activity level, and sleep quality. Patient has the above listed impairments and will benefit from skilled PT to improve deficits to return to prior level of function.       Prognosis: good    Goals  Impairment Goals: 4-6 weeks  - Patient to decrease pain to 0/10  - Patient to increase hip strength to 4+/5 throughout  - Patient to demonstrate proper core activation and body mechanics during functional activities    Functional Goals: by discharge  - Patient to discharge to independent [facility]  - Patient to return to prior level of function  - Patient to improve standing and walking tolerance to 20 minutes   - Patient to improve tolerance to lifting  - Patient to return to work     Plan  Patient would benefit from: skilled physical therapy  Planned modality interventions: cryotherapy, TENS and thermotherapy: hydrocollator packs    Planned therapy interventions: flexibility, home exercise program, joint mobilization, manual therapy, neuromuscular re-education, patient education, strengthening, stretching, therapeutic activities, therapeutic exercise and functional ROM exercises    Frequency: 1x week  Duration in weeks: 6  Treatment plan discussed with: patient        Subjective Evaluation    History of Present Illness  Mechanism of injury: HISTORY OF PRESENT ILLNESS: Patient presents s/p L3-S1 fusion 9/5/24. She presents to reexamination to reestablish care following 4-week gap since initial examination. Patient sustained a fall last week and was unable to take the bus to her appointment due to back soreness. No radicular pain, sensory changes, or B/B changes. She notes back pain that increased since her fall however she has tried to keep up with walking. She is walking about 15-20 minutes per day.   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  FUNCTIONAL LIMITATIONS: standing and walking > 15-20 minutes, unable to work, lifting heavy objects, limited general activity level, and sleep quality  SUBJECTIVE FUNCTIONAL LEVEL: 70-75%  PATIENT GOAL: I want to get back to %  Pain  Current pain ratin  At best pain ratin  At worst pain rating: 10  Location: Low back          Objective     Neurological Testing     Sensation     Lumbar   Left   Intact: light touch    Right   Intact: light touch    Reflexes   Left   Patellar (L4): normal (2+)  Achilles (S1): trace (1+)    Right   Patellar (L4): trace (1+)  Achilles (S1): trace (1+)    Active Range of Motion     Lumbar   Flexion:  WFL  Extension:  Restriction level: minimal  Left lateral flexion:  WFL  Right lateral flexion:  WFL  Left rotation:  Restriction level: minimal  Right rotation:  Restriction level: minimal    Additional Active Range of Motion Details  Mild soreness at end-ranges    Passive Range of Motion   Left Hip   Flexion: WFL  External rotation (90/90): WFL  Internal rotation (90/90): WFL    Right Hip   Flexion: WFL  External rotation (90/90): WFL  Internal rotation (90/90): WFL    Strength/Myotome Testing     Left Hip   Planes of Motion   Flexion: 4-  Abduction: 3+    Right Hip   Planes of Motion   Flexion: 4-  Abduction: 3+    Left Knee   Extension: 4+    Right Knee   Extension: 4+    Left Ankle/Foot   Dorsiflexion: 5  Eversion: 5  Great toe extension: 4    Right Ankle/Foot   Dorsiflexion: 5  Eversion: 5  Great toe extension: 4    Additional Strength Details  R SLR: 3+  L SLR: 3+    Tests     Lumbar     Left   Negative passive SLR and slump test.     Right   Negative passive SLR and slump test.                Diagnosis: s/p L3-S1 fusion 24   Precautions: hx cancer, HTN   Primary impairments: core stability deficits and  "generalized hip strength deficits   *asterisks by exercise = given for HEP     9/30 10/29         Manuals                                                                                   There Ex             Rec bike             LTR *   5\" x 10           SKTC *   10\" x 5           3-way P-ball flexion                                                                                   Neuro Re-Ed             Supine core brace *  5\" x 10  5\" x 10         Supine core brace with alt marches/BKFO             Dead bug isometric        Supine SLR *    x 15 B         Band hip abd slides   Red x 20 B         Sit to stand chair with foam    X 10         U/L leg press                                                                                                 Re-evaluation    CM         Ther Act/Gait                                         Modalities                                                                              "

## 2024-11-04 ENCOUNTER — NURSE TRIAGE (OUTPATIENT)
Age: 74
End: 2024-11-04

## 2024-11-04 DIAGNOSIS — G43.701 CHRONIC MIGRAINE WITHOUT AURA WITH STATUS MIGRAINOSUS, NOT INTRACTABLE: ICD-10-CM

## 2024-11-04 RX ORDER — PROMETHAZINE HYDROCHLORIDE 25 MG/1
25 TABLET ORAL EVERY 6 HOURS PRN
Qty: 90 TABLET | Refills: 0 | Status: SHIPPED | OUTPATIENT
Start: 2024-11-04

## 2024-11-04 RX ORDER — PREDNISONE 10 MG/1
TABLET ORAL
Qty: 32 TABLET | Refills: 0 | Status: SHIPPED | OUTPATIENT
Start: 2024-11-04

## 2024-11-04 NOTE — TELEPHONE ENCOUNTER
Patient called to check on the status of the medications that were supposed to be sent to the [facility] pharmacy for her migraine. Advised Phenergan and Prednisone were sent electronically to [facility]. Patient voiced clear understanding.

## 2024-11-04 NOTE — TELEPHONE ENCOUNTER
Pt called to f/u on previous phone call re: migraine. (see triage note below) She is also requesting a script for Phenergan 25mg. Please send to [facility].       WINSOME vargas

## 2024-11-04 NOTE — TELEPHONE ENCOUNTER
Left message to reschedule patients botox appt, needs transportation. She can come in, in [place] on 11/19 at 3pm

## 2024-11-04 NOTE — TELEPHONE ENCOUNTER
"Patient informed migraine for 1 week , denies its  the worst migraine of her life (as it restarts every am, and just intensifies throughout the day, going to a 10/10). She used her migraine meds Rizatriptan and Ubrelvy and Toradol, which usually work, but everyday for the past week her migraine returns the next day. She informed she had her last Botox at the end of June, was to have it again at end of Sept, but had  recent spine surgery at that time, so had to cancel her Botox.     Provider: Patient informed at this point with such an ongoing migraine, Prednisone taper usually is the only thing that works for her and requesting provider to send that to her [facility]  today, as they will deliver it for her, as she is not currently driving.     MA's: Pt also requesting a call back at  (may need to leave a msg on her phone, as her phone does not always ring, please inform her who is calling and office, so she knows whom to call back),  to stephon her Botox appt that is overdue.       Reason for Disposition   MILD to MODERATE headache     Patient requesting Steroid taper from provider to stop the migraine    Answer Assessment - Initial Assessment Questions  1. LOCATION: \"Where does it hurt?\"      The entire head area, in  back of the eyes area  (mostly in right area) and a lot of tension in the neck recently.   2. ONSET: \"When did the headache start?\" (e.g., minutes, hours, days)        1 wk ago  3. PATTERN: \"Does the pain come and go, or has it been constant since it started?\"      It starts as comes and goes, but Its been constant most of the day especially when it starts to get  much worse  4. SEVERITY: \"How bad is the pain?\" and \"What does it keep you from doing?\"  (e.g., Scale 1-10; mild, moderate, or severe)      Once its constant its a 10/10  5. RECURRENT SYMPTOM: \"Have you ever had headaches before?\" If Yes, ask: \"When was the last time?\" and \"What happened that time?\"       Yes, these " "are the normal symptoms she experiences , but the neck tension is worse than typical  6. CAUSE: \"What do you think is causing the headache?\"      Pt has had personal things that is causing increased stress and feels that is what triggered this migraine  7. MIGRAINE: \"Have you been diagnosed with migraine headaches?\" If Yes, ask: \"Is this headache similar?\"       Yes, similar , neck tension is just worse.   8. HEAD INJURY: \"Has there been any recent injury to the head?\"       No  9. OTHER SYMPTOMS: \"Do you have any other symptoms?\" (e.g., fever, stiff neck, eye pain, sore throat, cold symptoms)      Neck tension that she usually gets , but is just worse this time around. Extreme nausea, no med at home currently    Protocols used: Headache-Adult-[place]    "

## 2024-11-05 RX ORDER — SUMATRIPTAN 20 MG/1
SPRAY NASAL
Qty: 24 EACH | Refills: 0 | Status: SHIPPED | OUTPATIENT
Start: 2024-11-05

## 2024-11-06 NOTE — TELEPHONE ENCOUNTER
Left second message to get her rescheduled for her Botox with [facility], I have available 11/19 at 3pm.

## 2024-11-12 ENCOUNTER — PROCEDURE VISIT (OUTPATIENT)
Dept: NEUROLOGY | Facility: CLINIC | Age: 74
End: 2024-11-12
Payer: MEDICARE

## 2024-11-12 VITALS — SYSTOLIC BLOOD PRESSURE: 148 MMHG | DIASTOLIC BLOOD PRESSURE: 69 MMHG | TEMPERATURE: 97.4 F | HEART RATE: 72 BPM

## 2024-11-12 DIAGNOSIS — G43.701 CHRONIC MIGRAINE WITHOUT AURA WITH STATUS MIGRAINOSUS, NOT INTRACTABLE: Primary | ICD-10-CM

## 2024-11-12 DIAGNOSIS — G43.719 INTRACTABLE CHRONIC MIGRAINE WITHOUT AURA AND WITHOUT STATUS MIGRAINOSUS: ICD-10-CM

## 2024-11-12 PROCEDURE — 64615 CHEMODENERV MUSC MIGRAINE: CPT | Performed by: PHYSICIAN ASSISTANT

## 2024-11-12 RX ORDER — KETOROLAC TROMETHAMINE 30 MG/ML
30 INJECTION, SOLUTION INTRAMUSCULAR; INTRAVENOUS ONCE
Status: COMPLETED | OUTPATIENT
Start: 2024-11-12 | End: 2024-11-12

## 2024-11-12 RX ADMIN — KETOROLAC TROMETHAMINE 30 MG: 30 INJECTION, SOLUTION INTRAMUSCULAR; INTRAVENOUS at 10:48

## 2024-11-12 NOTE — PROGRESS NOTES
Universal Protocol   Consent: Verbal consent obtained. Written consent obtained.  Risks and benefits: risks, benefits and alternatives were discussed  Consent given by: patient  Patient understanding: patient states understanding of the procedure being performed  Patient consent: the patient's understanding of the procedure matches consent given  Procedure consent: procedure consent matches procedure scheduled      Chemodenervation     Date/Time  11/12/2024 10:00 AM     Performed by  Mady Brannon PA-C   Authorized by  Mady Brannon PA-C     Pre-procedure details      Prepped With: Alcohol     Procedure details      Position:  Upright   Botox      Botox Type:  Type A    Brand:  Botox    mL's of Botulinum Toxin:  200    Final Concentration per CC:  100 units    Needle Gauge:  30 G 2.5 inch   Procedures      Botox Procedures: chronic headache      Indications: migraines     Injection Location      Head / Face:  L superior trapezius, R superior trapezius, L superior cervical paraspinal, R superior cervical paraspinal, L , R , procerus, L temporalis, R temporalis, R frontalis, L frontalis, R medial occipitalis and L medial occipitalis    L  injection amount:  5 unit(s)    R  injection amount:  5 unit(s)    L lateral frontalis:  5 unit(s)    R lateral frontalis:  5 unit(s)    L medial frontalis:  5 unit(s)    R medial frontalis:  5 unit(s)    L temporalis injection amount:  20 unit(s)    R temporalis injection amount:  20 unit(s)    Procerus injection amount:  5 unit(s)    L medial occipitalis injection amount:  15 unit(s)    R medial occipitalis injection amount:  15 unit(s)    L superior cervical paraspinal injection amount:  10 unit(s)    R superior cervical paraspinal injection amount:  10 unit(s)    L superior trapezius injection amount:  15 unit(s)    R superior trapezius injection amount:  15 unit(s)   Total Units      Total units used:  200    Total units discarded:  0    Post-procedure details      Chemodenervation:  Chronic migraine    Facial Nerve Location::  Bilateral facial nerve    Patient tolerance of procedure:  Tolerated well, no immediate complications   Comments       Extra medically necessary:  - 20 R occipitalis (lateral)  - 15 between the right and left suboccipital regions, lateral aspects  - 10 right lateral orbicularis oculi       Blood pressure 148/69, pulse 72, temperature (!) 97.4 °F (36.3 °C), temperature source Temporal.

## 2024-11-20 ENCOUNTER — TELEPHONE (OUTPATIENT)
Age: 74
End: 2024-11-20

## 2024-12-03 DIAGNOSIS — G43.719 INTRACTABLE CHRONIC MIGRAINE WITHOUT AURA AND WITHOUT STATUS MIGRAINOSUS: ICD-10-CM

## 2024-12-04 RX ORDER — RIZATRIPTAN BENZOATE 10 MG/1
TABLET, ORALLY DISINTEGRATING ORAL
Qty: 12 TABLET | Refills: 5 | Status: SHIPPED | OUTPATIENT
Start: 2024-12-04

## 2024-12-04 NOTE — TELEPHONE ENCOUNTER
Chief Complaint   Patient presents with     Hospital F/U     follow up liver [facility] transplant-3/4/17   Che Villasenor LPN 2:54 PM on 5/11/2017   Patient needs an appointment. Please contact the patient to schedule an appointment. Last office visit: 3/12/2024

## 2025-01-03 ENCOUNTER — TELEPHONE (OUTPATIENT)
Dept: NEUROLOGY | Facility: CLINIC | Age: 75
End: 2025-01-03

## 2025-01-03 DIAGNOSIS — G43.701 CHRONIC MIGRAINE WITHOUT AURA WITH STATUS MIGRAINOSUS, NOT INTRACTABLE: ICD-10-CM

## 2025-01-06 RX ORDER — PROMETHAZINE HYDROCHLORIDE 25 MG/1
25 TABLET ORAL EVERY 6 HOURS PRN
Qty: 90 TABLET | Refills: 3 | Status: SHIPPED | OUTPATIENT
Start: 2025-01-06

## 2025-01-08 ENCOUNTER — TELEPHONE (OUTPATIENT)
Dept: NEUROLOGY | Facility: CLINIC | Age: 75
End: 2025-01-08

## 2025-01-08 NOTE — TELEPHONE ENCOUNTER
[facility] enc [facility], [facility]  on 24    [facility]. Key [id number]  Received message from the plan-  The patient currently has access to the requested medication and a Prior Authorization is not needed for the patient/medication.

## 2025-01-29 DIAGNOSIS — G43.701 CHRONIC MIGRAINE WITHOUT AURA WITH STATUS MIGRAINOSUS, NOT INTRACTABLE: ICD-10-CM

## 2025-01-30 RX ORDER — RIMEGEPANT SULFATE 75 MG/75MG
TABLET, ORALLY DISINTEGRATING ORAL
Qty: 8 TABLET | Refills: 5 | Status: SHIPPED | OUTPATIENT
Start: 2025-01-30

## 2025-02-03 ENCOUNTER — TELEPHONE (OUTPATIENT)
Age: 75
End: 2025-02-03

## 2025-02-03 NOTE — TELEPHONE ENCOUNTER
Pt wanted to confirm Botox inj appt on 2/22/25 @ 11am.    Pt also wanted to schedule OV w/MK to discuss medications  .   LOV - 3/2024     There was an available appt for tomorrow, 2/4/25 @ 10am. Pt was agreeable to this appt.       Clerical Team/Anabell/WINSOME - sharadi to add-on appt

## 2025-02-04 ENCOUNTER — OFFICE VISIT (OUTPATIENT)
Dept: NEUROLOGY | Facility: CLINIC | Age: 75
End: 2025-02-04
Payer: MEDICARE

## 2025-02-04 VITALS
DIASTOLIC BLOOD PRESSURE: 67 MMHG | HEIGHT: 65 IN | SYSTOLIC BLOOD PRESSURE: 148 MMHG | BODY MASS INDEX: 23.32 KG/M2 | WEIGHT: 140 LBS | HEART RATE: 69 BPM

## 2025-02-04 DIAGNOSIS — M54.2 MYOFASCIAL NECK PAIN: ICD-10-CM

## 2025-02-04 DIAGNOSIS — G43.719 INTRACTABLE CHRONIC MIGRAINE WITHOUT AURA AND WITHOUT STATUS MIGRAINOSUS: Primary | ICD-10-CM

## 2025-02-04 PROCEDURE — 20553 NJX 1/MLT TRIGGER POINTS 3/>: CPT | Performed by: PHYSICIAN ASSISTANT

## 2025-02-04 PROCEDURE — 99214 OFFICE O/P EST MOD 30 MIN: CPT | Performed by: PHYSICIAN ASSISTANT

## 2025-02-04 RX ORDER — LIDOCAINE HYDROCHLORIDE 10 MG/ML
2.5 INJECTION, SOLUTION INFILTRATION; PERINEURAL ONCE
Status: COMPLETED | OUTPATIENT
Start: 2025-02-04 | End: 2025-02-04

## 2025-02-04 RX ORDER — BUPIVACAINE HYDROCHLORIDE 2.5 MG/ML
2.5 INJECTION, SOLUTION EPIDURAL; INFILTRATION; INTRACAUDAL ONCE
Status: COMPLETED | OUTPATIENT
Start: 2025-02-04 | End: 2025-02-04

## 2025-02-04 RX ORDER — KETOROLAC TROMETHAMINE 30 MG/ML
INJECTION, SOLUTION INTRAMUSCULAR; INTRAVENOUS
Qty: 4 ML | Refills: 1 | Status: SHIPPED | OUTPATIENT
Start: 2025-02-04

## 2025-02-04 RX ADMIN — LIDOCAINE HYDROCHLORIDE 2.5 ML: 10 INJECTION, SOLUTION INFILTRATION; PERINEURAL at 11:59

## 2025-02-04 RX ADMIN — BUPIVACAINE HYDROCHLORIDE 2.5 ML: 2.5 INJECTION, SOLUTION EPIDURAL; INFILTRATION; INTRACAUDAL at 11:59

## 2025-02-04 NOTE — PROGRESS NOTES
Name: Jovita Bauman      : 1950      MRN: [id number]  Encounter Provider: Mady Brannon PA-C  Encounter Date: 2025   Encounter department: [facility]  :  Assessment & Plan  Intractable chronic migraine without aura and without status migrainosus  Since starting botox, the patient reports greater than 7 days of migraine relief from baseline, correlated with headache diary, decreased abortive medication use and decreased ER visits.    Orders:    ketorolac (TORADOL) 60 mg/2 mL; INJECT 1 ML (30MG) AS NEEDED FOR MIGRAINE REPEAT AFTER 6 HOURS IF NEEDED MAX 2 INJECTIONS A WEEK    Myofascial neck pain  Tpis done today emergently.    Orders:    lidocaine (XYLOCAINE) 1 % injection 2.5 mL    bupivacaine (PF) (MARCAINE) 0.25 % injection 2.5 mL    Trigger Injection 1 or 2 muscles      The patient should not hesitate to call me prior to her follow up with any questions or concerns.     Universal Protocol:  Consent: The procedure was performed in an emergent situation. Verbal consent obtained.  Risks and benefits: risks, benefits and alternatives were discussed  Consent given by: patient  Procedure Details  Location(s):  Patient tolerance: patient tolerated the procedure well with no immediate complications  Additional procedure details: Trigger point injections were performed on an emergent basis and are a part of ongoing therapy.    Risks, benefits, alternatives, infection, bleeding and allergic reaction were discussed with the patient. Verbal consent was obtained prior to the procedure and is detailed in the patient's record.    Trigger point injections were performed in the following locations using a mixture of 2.5 mL 0.25% bupivacaine + 2.5 mL of 1% lidocaine, without steroid, using a 30 gauge, 1/2 inch needle: b/l traps and b/l occipitalis muscles.          There are no Patient Instructions on file for this visit.     History of Present Illness   HPI     Ms. Jovita Bauman  is a very pleasant 72-year-old female who is here for neurological follow-up for migraine headaches.  Now following with [facility] pain management status post bilateral L4-5 L5-S1 MBB #1 1/3/1723.  Recent right-sided facet rhizotomy 4/27/2023 in the low back.  S/p C4-7 ACDF 10/24/2022 (R arm and shoulder pain significantly improved after the surgery thankfully.  She states her activities of stenciling which she loves to do sometimes makes her R shoulder act up).    2/4/25: wants to discuss medications, says migraines have been terrible, has been under a lot of stress and strain has one today.  Very bad fall beg of January- on chart review it was dec  Stepped down on solid black ice and lost ba;ance and slipeed, flew 5-6 feet, landed on the R hip  States tunnel vision, blackness in vision, not sure how long it lasted  Tried to grab railing  730 AM  Pulled herself up and walked snowy ice and snow to get back in  L vert fractured  Nerves are contributing to migraines  Got written up 2x by manager, doesn't like where she lives     Prior note- Today, 9/19/23: Has been better, has a really bad migraine today. Pt states she had cataracts surgery in Aug on both eyes.     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  The patient's client does not use the air conditioning during the day since it is too expensive.  The patient states that she started working during the evening/night hours and her migraine headaches significantly subsided because she is not exposed to significant heat.  Her client has a small air conditioner in her room and sleeps in their, the AC is only turned on at night.     Migraines  Onset: Began early 20s     Current pain: 7/10  Reaches pain level at worst: 10 out of 10     Frequency: 15 per month  Duration: A couple days depending on the medication she takes and/or triggers  Location: Ocular pain  "bilaterally right >left, which progresses to generalized pain throughout the head/holocranial and radiating to the occipital region  Quality: Sharp, knifelike, tension  Sometimes alleviated with ice packs, ties a scarf on head, used to use Aspercreme on neck  Associated with: nausea, photophobia, phonophobia, sometimes blurry vision, no autonomic features, stiff/sore neck, sometimes congestion, sometimes dizziness     Triggers:   -- Sunlight, wears sunglasses around the house sometimes  --Hot weather  --Stress  --Neck pain     Aura/ warning: none     Medications tried:  Prevention-  Gabapentin- can only take qhs  Tizanidine  Emgality  Verapamil  Duloxetine  Baclofen  Qulipta-side effects  Lexapro  Doxepin  Bisoprolol  Vyepti     Abortive-  Rizatriptan  Fioricet  Narcotics-oxycodone  Toradol injection  Toradol p.o.  Ativan  Prednisone taper  Promethazine  Olanzapine  Compazine-worsened restless leg symptoms  Zofran     Other non-medication therapies or treatments-  -- Trigger point injections were helpful in the past  --Botox  -- [facility] chiropractic care for neck pain-helpful  -- Medical marijuana  --Massage helpful but expensive  -- Acupuncture not helpful     She used to see Dr. Mohr at [facility], 10/9/2020     Follows with a psychiatrist at [facility]".     Sleep concerns:  --  She states that she has poor sleeping patterns, sleeping 3-4 hours a day.  Patient has problems falling asleep and staying asleep.     Family hx of migraines:  Family hx of cerebral aneurysms:     Lifestyle:  In terms of her lifestyle patient lives alone.  She drinks only decaf coffee. She does smoke, she drinks occasionally and denies use of illicit drugs.     Diagnostics:  Brain MRI with and without contrast 8/13/2020: \"No mass or abnormal contrast enhancement. Mild degree of FLAIR hyperintensity in cerebral white matter and in the yolanda. Chronic small vessel ischemic disease is the most likely etiology. Other possible etiologies " "include gliosis and demyelination.\"     Cervical spine MRI without contrast 1/24/2022: \"Progression of multilevel degenerative change in the cervical spine. Disc osteophyte complex causes mild spinal cord impingement at C4-C5. Disc osteophyte complex at C5-C6 mildly abuts the ventral spinal cord. There is no abnormal cord signal identified. Neural foraminal narrowing at multiple levels as detailed above.\"     Most recent labs 5-23 with a BUN of 26, GFR 46, otherwise unremarkable CMP, CBC and differential with white blood cells 12.6, absolute neutrophils 8.74.     Prior documentation:  Unfortunately the patient stepped on a broken piece of glass that she could not see on her carpet.  She accidentally broke a piece of glass 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 of her art work.  She states she did not have a vacuum , it was broken.  She stepped on the glass and it went into her right heel and it is near the bone per her history.  She states she went to see the doctor and he could not remove it now she is scheduled for surgery 5/8/2023 for foreign body removal.  She denies any infectious symptoms, states it does not look red or indurated.  Not bleeding.  It is painful, so she steps on the balls of her feet so that she does not have to put pressure on the heel.  She is tolerating the pain, it is not excessive.  She states if it gets too painful she will go to the emergency room or call her doctor.     Her migraine headaches are better managed now that she had a repeat Botox injection this past March, it was on 3/28/2023.  She still gets frequent migraine headaches and we added Qulipta for this as a prevention method at the last visit.  She states after a few days of taking it at 30 mg she was getting a bit nauseous so she stopped it.  She would be agreeable to taking a half dose of that or decreasing the dose.  She did take it with food and it still made her slightly nauseous.     She continues to have specific triggers, including " rainy days.  It feels like her eyes are going to pop out when it is a rainy day or when the weather changes.  She also notes cataracts bilaterally and she states she needs to get them removed, she thinks this contributes to migraines.     Otherwise no changes in character to her migraine headaches.  She uses either Maxalt or sumatriptan nasal spray and she is happy with the effectiveness of either of these.  Sumatriptan nasal spray sometimes causes a bad taste in the mouth so she chews gum and this helps.  She does not take these to triptans together.  She does not typically use Ravenelle but has it on hand if needed, this does work too.     Prior note:   She reports 2 migraine headaches per week  She typically is triggered by stress, and sometimes physical therapy triggers her headaches.  When she works on weekends it increases her stress and headaches.     She reports that migraine headaches have made a slight increase in frequency since she had a cervical fusion status post 10/24/2022.  Right arm and shoulder pain significantly improved after the surgery thankfully.  She states her activities of stenciling which she loves to do sometimes makes her R shoulder act up.     She reports that Maxalt ODT works well to alleviate the migraine within about 20 minutes, and sometimes she needs to take a second one after 2 hours.  Toradol injection does not work well.  Ativan does help with neck tension and myofascial pain.  She took a half tab of reyvow once to try it out and it did reduce the migraine but the patient does not report sedation or any other side effects to this.     She has a tendency to get nausea and sick with a migraine and is asking for an alternative nausea medication other than Compazine and Zofran.        Review of Systems   Constitutional:  Negative for appetite change, fatigue and fever.   HENT: Negative.  Negative for hearing loss, tinnitus, trouble swallowing and voice change.    Eyes: Negative.   Negative for photophobia, pain and visual disturbance.   Respiratory: Negative.  Negative for shortness of breath.    Cardiovascular: Negative.  Negative for palpitations.   Gastrointestinal: Negative.  Negative for nausea and vomiting.   Endocrine: Negative.  Negative for cold intolerance.   Genitourinary: Negative.  Negative for dysuria, frequency and urgency.   Musculoskeletal:  Negative for back pain, gait problem, myalgias, neck pain and neck stiffness.   Skin: Negative.  Negative for rash.   Allergic/Immunologic: Negative.    Neurological:  Positive for headaches. Negative for dizziness, tremors, seizures, syncope, facial asymmetry, speech difficulty, weakness, light-headedness and numbness.   Hematological: Negative.  Does not bruise/bleed easily.   Psychiatric/Behavioral: Negative.  Negative for confusion, hallucinations and sleep disturbance.     I have personally reviewed the MA's review of systems and made changes as necessary.    Pertinent Medical History         Medical History Reviewed by provider this encounter:     .  Past Medical History   Past Medical History:   Diagnosis Date    Allergic rhinitis     Anxiety     Arthritis     Cancer (HCC)     Skin     Chronic pain disorder     knee and back    Chronic prescription opiate use     Depression     GERD (gastroesophageal reflux disease)     Hypertension     Irritable bowel syndrome     Migraines     Spinal stenosis      Past Surgical History:   Procedure Laterality Date    CERVICAL FUSION      CHOLECYSTECTOMY      COLONOSCOPY      WI BLEPHAROPLASTY UPPER EYELID W/EXCESSIVE SKIN Bilateral 01/29/2020    Procedure: BLEPHAROPLASTY UPPER;  Surgeon: Lavon Altamirano MD;  Location:  MAIN OR;  Service: Plastics    REPLACEMENT TOTAL KNEE Right     ROTATOR CUFF REPAIR Bilateral      Family History   Problem Relation Age of Onset    Breast cancer Mother     Alzheimer's disease Father     Depression Sister       reports that she has been smoking cigarettes. She  has never used smokeless tobacco. She reports current alcohol use. She reports that she does not use drugs.  Current Outpatient Medications on File Prior to Visit   Medication Sig Dispense Refill    buPROPion (WELLBUTRIN XL) 300 mg 24 hr tablet Take 1 tablet (300 mg total) by mouth daily 30 tablet 5    cetirizine (ZyrTEC) 10 mg tablet Take 10 mg by mouth daily      cyanocobalamin 1,000 mcg/mL Inject once per month. 3 mL 3    cyclobenzaprine (FLEXERIL) 10 mg tablet TAKE 1 TABLET BY MOUTH EVERY 12 HOURS AS NEEDED FOR MODERATE TO SEVERE NECK PAIN/ MUSCLE SPASM 30 tablet 2    dicyclomine (BENTYL) 10 mg capsule Take 1 capsule (10 mg total) by mouth 3 (three) times a day as needed (stomach pain) PRN 90 capsule 0    gabapentin (NEURONTIN) 300 mg capsule TAKE 2 CAPSULES BY MOUTH qhs 180 capsule 3    lasmiditan succinate (REYVOW) 100 mg tablet Take 1/2- 1 tab qhs prn migraine. Caution sedation. Do not use more than one dose per 24 hours, or more than four doses per month. 4 tablet 5    LORazepam (ATIVAN) 2 mg tablet Take 2 mg by mouth 3 (three) times a day as needed      losartan (COZAAR) 50 mg tablet Take 50 mg by mouth daily      Nurtec 75 MG TBDP TAKE 1 TABLET AT MIGRAINE ONSET. NO MORE THAN ONE DOSE PER 24 HOURS. 8 tablet 5    oxyCODONE (OxyCONTIN) 10 mg 12 hr tablet Take 15 mg by mouth 2 (two) times a day       predniSONE 10 mg tablet Take 4 tabs x 3 days, then 3 tabs x 3 days, then 2 tabs x 3 days, then 1 tab x 3 days, then 0.5 tab x 3 days, then stop 32 tablet 0    promethazine (PHENERGAN) 25 mg tablet Take 1 tablet (25 mg total) by mouth every 6 (six) hours as needed for nausea or vomiting 90 tablet 3    rizatriptan (MAXALT-MLT) 10 mg disintegrating tablet DISSOLVE 1 TABLET UNDER THE TONGUE AT ONSET OF MIGRAINE IF SYMPTOMS CONTINUE OR RETURN MAY TAKE ANOTHER DOSE 1 TABLET AT LEAST 2 HOURS AFTER FIRST DOSE NO MORE THAN 2 DOSES IN ONE DAY 12 tablet 5    rOPINIRole (REQUIP) 1 mg tablet Take 4 mg by mouth daily        "sertraline (ZOLOFT) 50 mg tablet TAKE 1 TABLET BY MOUTH EVERY DAY 90 tablet 1    SUMAtriptan (IMITREX) 20 MG/ACT nasal spray INHALE 1 SPRAY INTO ONE NOSTRIL AT HEADACHE ONSET MAY REPEAT AFTER 2 HOURS AS NEEDED NO MORE THAN 2 DOSES PER DAY DO NOT TAKE MAXALT WITH THIS 24 each 0    Syringe/Needle, Disp, (SYRINGE 3CC/25GX1\") 25G X 1\" 3 ML MISC Use syringes for B12 and/or toradol injections 8 each 3    Ubrogepant (Ubrelvy) 100 MG tablet TAKE ONE TABLET BY MOUTH AT MIGRAINE ONSET REPEAT IN 2 HOURS AS NEEDED MAX 2 PER DAY 16 tablet 0    azelastine (ASTELIN) 0.1 % nasal spray 2 sprays into each nostril 2 (two) times a day Use in each nostril as directed (Patient not taking: Reported on 3/12/2024)       Current Facility-Administered Medications on File Prior to Visit   Medication Dose Route Frequency Provider Last Rate Last Admin    prochlorperazine (COMPAZINE) injection 5 mg  5 mg Intramuscular Q4H PRN Camille Rodriguez MD         Allergies   Allergen Reactions    Metaxalone Hives and Rash     Reaction Date: 01Sep1990; Category: Allergy;     Aspirin Other (See Comments)    Codeine Itching    Morphine GI Intolerance, Nausea Only and Other (See Comments)     \"my stomach goes on fire\"      Other Itching     Category: Adverse Reaction;     Eggs Or Egg-Derived Products - Food Allergy Palpitations    Naproxen Palpitations    Tapentadol Other (See Comments)     tachycardia      Current Outpatient Medications on File Prior to Visit   Medication Sig Dispense Refill    buPROPion (WELLBUTRIN XL) 300 mg 24 hr tablet Take 1 tablet (300 mg total) by mouth daily 30 tablet 5    cetirizine (ZyrTEC) 10 mg tablet Take 10 mg by mouth daily      cyanocobalamin 1,000 mcg/mL Inject once per month. 3 mL 3    cyclobenzaprine (FLEXERIL) 10 mg tablet TAKE 1 TABLET BY MOUTH EVERY 12 HOURS AS NEEDED FOR MODERATE TO SEVERE NECK PAIN/ MUSCLE SPASM 30 tablet 2    dicyclomine (BENTYL) 10 mg capsule Take 1 capsule (10 mg total) by mouth 3 (three) " "times a day as needed (stomach pain) PRN 90 capsule 0    gabapentin (NEURONTIN) 300 mg capsule TAKE 2 CAPSULES BY MOUTH qhs 180 capsule 3    lasmiditan succinate (REYVOW) 100 mg tablet Take 1/2- 1 tab qhs prn migraine. Caution sedation. Do not use more than one dose per 24 hours, or more than four doses per month. 4 tablet 5    LORazepam (ATIVAN) 2 mg tablet Take 2 mg by mouth 3 (three) times a day as needed      losartan (COZAAR) 50 mg tablet Take 50 mg by mouth daily      Nurtec 75 MG TBDP TAKE 1 TABLET AT MIGRAINE ONSET. NO MORE THAN ONE DOSE PER 24 HOURS. 8 tablet 5    oxyCODONE (OxyCONTIN) 10 mg 12 hr tablet Take 15 mg by mouth 2 (two) times a day       predniSONE 10 mg tablet Take 4 tabs x 3 days, then 3 tabs x 3 days, then 2 tabs x 3 days, then 1 tab x 3 days, then 0.5 tab x 3 days, then stop 32 tablet 0    promethazine (PHENERGAN) 25 mg tablet Take 1 tablet (25 mg total) by mouth every 6 (six) hours as needed for nausea or vomiting 90 tablet 3    rizatriptan (MAXALT-MLT) 10 mg disintegrating tablet DISSOLVE 1 TABLET UNDER THE TONGUE AT ONSET OF MIGRAINE IF SYMPTOMS CONTINUE OR RETURN MAY TAKE ANOTHER DOSE 1 TABLET AT LEAST 2 HOURS AFTER FIRST DOSE NO MORE THAN 2 DOSES IN ONE DAY 12 tablet 5    rOPINIRole (REQUIP) 1 mg tablet Take 4 mg by mouth daily       sertraline (ZOLOFT) 50 mg tablet TAKE 1 TABLET BY MOUTH EVERY DAY 90 tablet 1    SUMAtriptan (IMITREX) 20 MG/ACT nasal spray INHALE 1 SPRAY INTO ONE NOSTRIL AT HEADACHE ONSET MAY REPEAT AFTER 2 HOURS AS NEEDED NO MORE THAN 2 DOSES PER DAY DO NOT TAKE MAXALT WITH THIS 24 each 0    Syringe/Needle, Disp, (SYRINGE 3CC/25GX1\") 25G X 1\" 3 ML MISC Use syringes for B12 and/or toradol injections 8 each 3    Ubrogepant (Ubrelvy) 100 MG tablet TAKE ONE TABLET BY MOUTH AT MIGRAINE ONSET REPEAT IN 2 HOURS AS NEEDED MAX 2 PER DAY 16 tablet 0    azelastine (ASTELIN) 0.1 % nasal spray 2 sprays into each nostril 2 (two) times a day Use in each nostril as directed (Patient " "not taking: Reported on 3/12/2024)       Current Facility-Administered Medications on File Prior to Visit   Medication Dose Route Frequency Provider Last Rate Last Admin    prochlorperazine (COMPAZINE) injection 5 mg  5 mg Intramuscular Q4H PRN Camille Rodriguez MD          Social History     Tobacco Use    Smoking status: Every Day     Current packs/day: 0.50     Types: Cigarettes    Smokeless tobacco: Never   Vaping Use    Vaping status: Never Used   Substance and Sexual Activity    Alcohol use: Yes     Comment: occ    Drug use: No    Sexual activity: Not on file        Objective   /67 (BP Location: Left arm, Patient Position: Sitting, Cuff Size: Standard)   Pulse 69   Ht 5' 5\" (1.651 m)   Wt 63.5 kg (140 lb)   BMI 23.30 kg/m²     Physical Exam  Neurological Exam  On neurologic exam, the patient is alert and oriented to time and place. Speech is fluent and articulate, and the patient follows commands appropriately. Judgment and affect appear normal. Pupils are equally round and reactive to light and extraocular muscles are intact without nystagmus. Face is symmetric, and tongue, uvula, and palate are midline. Hearing is intact. Motor examination reveals intact strength throughout. Neck flexors 5/5, no TTP. Normal gait is steady.    Administrative Statements   I have spent a total time of 30 minutes in caring for this patient on the day of the visit/encounter.    "

## 2025-02-11 ENCOUNTER — PROCEDURE VISIT (OUTPATIENT)
Dept: NEUROLOGY | Facility: CLINIC | Age: 75
End: 2025-02-11
Payer: MEDICARE

## 2025-02-11 VITALS — DIASTOLIC BLOOD PRESSURE: 89 MMHG | HEART RATE: 68 BPM | TEMPERATURE: 97.4 F | SYSTOLIC BLOOD PRESSURE: 155 MMHG

## 2025-02-11 DIAGNOSIS — G43.719 INTRACTABLE CHRONIC MIGRAINE WITHOUT AURA AND WITHOUT STATUS MIGRAINOSUS: Primary | ICD-10-CM

## 2025-02-11 PROCEDURE — 64615 CHEMODENERV MUSC MIGRAINE: CPT | Performed by: PHYSICIAN ASSISTANT

## 2025-02-11 RX ORDER — KETOROLAC TROMETHAMINE 30 MG/ML
30 INJECTION, SOLUTION INTRAMUSCULAR; INTRAVENOUS ONCE
Status: COMPLETED | OUTPATIENT
Start: 2025-02-11 | End: 2025-02-11

## 2025-02-11 RX ADMIN — KETOROLAC TROMETHAMINE 30 MG: 30 INJECTION, SOLUTION INTRAMUSCULAR; INTRAVENOUS at 10:47

## 2025-02-11 NOTE — PROGRESS NOTES
Universal Protocol   Consent: Verbal consent obtained. Written consent obtained.  Risks and benefits: risks, benefits and alternatives were discussed  Consent given by: patient  Patient understanding: patient states understanding of the procedure being performed  Patient consent: the patient's understanding of the procedure matches consent given  Procedure consent: procedure consent matches procedure scheduled      Chemodenervation     Date/Time  2/11/2025 10:00 AM     Performed by  Mady Brannon PA-C   Authorized by  Mady Brannon PA-C     Pre-procedure details      Prepped With: Alcohol     Procedure details      Position:  Upright   Botox      Botox Type:  Type A    Brand:  Botox    mL's of Botulinum Toxin:  200    Final Concentration per CC:  100 units    Needle Gauge:  30 G 2.5 inch   Procedures      Botox Procedures: chronic headache      Indications: migraines     Injection Location      Head / Face:  L superior trapezius, R superior trapezius, L superior cervical paraspinal, R superior cervical paraspinal, L , R , procerus, L temporalis, R temporalis, R frontalis, L frontalis, R medial occipitalis and L medial occipitalis    L  injection amount:  5 unit(s)    R  injection amount:  5 unit(s)    L lateral frontalis:  5 unit(s)    R lateral frontalis:  5 unit(s)    L medial frontalis:  5 unit(s)    R medial frontalis:  5 unit(s)    L temporalis injection amount:  20 unit(s)    R temporalis injection amount:  20 unit(s)    Procerus injection amount:  5 unit(s)    L medial occipitalis injection amount:  15 unit(s)    R medial occipitalis injection amount:  15 unit(s)    L superior cervical paraspinal injection amount:  10 unit(s)    R superior cervical paraspinal injection amount:  10 unit(s)    L superior trapezius injection amount:  15 unit(s)    R superior trapezius injection amount:  15 unit(s)   Total Units      Total units used:  200    Total units discarded:  0    Post-procedure details      Chemodenervation:  Chronic migraine    Facial Nerve Location::  Bilateral facial nerve    Patient tolerance of procedure:  Tolerated well, no immediate complications   Comments       Extra medically necessary:  - 20 R occipitalis (lateral)  - 15 between the right and left suboccipital regions, lateral aspects  - 10 right lateral orbicularis oculi       Blood pressure 155/89, pulse 68, temperature (!) 97.4 °F (36.3 °C), temperature source Temporal.

## 2025-02-16 NOTE — ASSESSMENT & PLAN NOTE
Tpis done today emergently.    Orders:    lidocaine (XYLOCAINE) 1 % injection 2.5 mL    bupivacaine (PF) (MARCAINE) 0.25 % injection 2.5 mL    Trigger Injection 1 or 2 muscles

## 2025-02-16 NOTE — ASSESSMENT & PLAN NOTE
Since starting botox, the patient reports greater than 7 days of migraine relief from baseline, correlated with headache diary, decreased abortive medication use and decreased ER visits.    Orders:    ketorolac (TORADOL) 60 mg/2 mL; INJECT 1 ML (30MG) AS NEEDED FOR MIGRAINE REPEAT AFTER 6 HOURS IF NEEDED MAX 2 INJECTIONS A WEEK

## 2025-03-14 ENCOUNTER — TELEPHONE (OUTPATIENT)
Dept: NEUROLOGY | Facility: CLINIC | Age: 75
End: 2025-03-14

## 2025-03-14 NOTE — TELEPHONE ENCOUNTER
Received via mail in the [facility] from [facility] quantity limit notification regarding Promethazine tab 25 mg.  Notification scanned into .

## 2025-04-14 ENCOUNTER — NURSE TRIAGE (OUTPATIENT)
Age: 75
End: 2025-04-14

## 2025-04-14 DIAGNOSIS — G43.701 CHRONIC MIGRAINE WITHOUT AURA WITH STATUS MIGRAINOSUS, NOT INTRACTABLE: ICD-10-CM

## 2025-04-14 NOTE — TELEPHONE ENCOUNTER
"FOLLOW UP: Mady Brannon PA-C please advise, thank you!    REASON FOR CONVERSATION: Migraine    SYMPTOMS: Migraine since Thursday or Friday, pain In eyes with occasional blurred vision.     OTHER: Please call patient at [phone] with any updates. Thank you!    DISPOSITION: Discuss with Provider and Call Back Patient (overriding Callback by PCP Within 1 Hour)        Reason for Disposition   SEVERE headache and not relieved by pain meds    Answer Assessment - Initial Assessment Questions  .MIGRAINEHA   When did migraine start? \"Thursday or Friday.\"  Location/Description: Patient has been waking up with it, it comes and goes. Patient rates the pain a 5 out of ten and her back is a 7 out of ten. Christen said its around her eyes and her neck is tight. Swords piercing her eyes. Patient said it is her typical terrible migraine.    Associated symptoms: Christen said she has blurred vision sometimes. Christen is nauseated and light sensitive.     Precipitating factors: stress, rainy weather worsens it.     Alleviating factors: nothing.     What medications have you tried for this migraine headache? Rizatriptan and sumatriptan was last used yesterday. Christen took a ubrelvy this morning. Venancionet states it helps for a little bit and then it comes back.     Current migraine medications are confirmed as: 05/13/2025 is the next Botox. Christen states Reyvow does not work that great. Christen takes SUMAtriptan (IMITREX) 20 MG/ACT nasal spray, rizatriptan (MAXALT-MLT) 10 mg disintegrating tablet, Toradol, B-12, Ubrelvy. Patient states currently her toradol, B-12 and another medication is at the pharmacy but she can't pick it up because it costs $21. Patient will not get \"a check\" until 04/23/2025.      Medications tried in the past?  Steroid taper has helped in the past. Last time it took longer Botox to kick in which was abnormal for her.     Patient confirmed pharmacy as:   [id number] - [place] - [address] [place] [address]  Phone: [phone]  Fax: [phone]    Protocols used: Headache-Adult-OH

## 2025-04-15 RX ORDER — PREDNISONE 10 MG/1
TABLET ORAL
Qty: 32 TABLET | Refills: 0 | Status: SHIPPED | OUTPATIENT
Start: 2025-04-15

## 2025-04-15 NOTE — TELEPHONE ENCOUNTER
Called and left a detailed message on pt's answering machine of the below (ok per consent) and for a call back if any questions,    Insulin ss, accuchecks  A1c check outpatient with PCP

## 2025-04-29 ENCOUNTER — TELEPHONE (OUTPATIENT)
Age: 75
End: 2025-04-29

## 2025-04-29 DIAGNOSIS — G43.719 INTRACTABLE CHRONIC MIGRAINE WITHOUT AURA AND WITHOUT STATUS MIGRAINOSUS: ICD-10-CM

## 2025-04-29 DIAGNOSIS — G43.701 CHRONIC MIGRAINE WITHOUT AURA WITH STATUS MIGRAINOSUS, NOT INTRACTABLE: ICD-10-CM

## 2025-04-29 NOTE — TELEPHONE ENCOUNTER
Patient called and stated that her migraines have gotten worse and that Toradol 2 injections a week is not working as well.  Is there any way that Sami can up medication?

## 2025-04-29 NOTE — TELEPHONE ENCOUNTER
Patient called and stated that she would like to start infusions for migraines, she only had one.    Please assist

## 2025-04-30 NOTE — TELEPHONE ENCOUNTER
Is she referring to vyepti?    There is another encounter on increasing the Toradol, which I prefer not to do because her kidney function is low, last GFR 56.  Toradol can be toxic to the kidneys.  Is anything else that works for the patient that is not NSAID related?  How is Ubrelvy working?

## 2025-04-30 NOTE — TELEPHONE ENCOUNTER
There was another enc where I commented that GFR is low, so need to limit toradol. Wanted to know how ubrlevy works?  I think she is also interested in vyepti.

## 2025-04-30 NOTE — TELEPHONE ENCOUNTER
Left message for pt to call office back  Please also See other encounter from 4/29    Please send refill if agreeable or refuse refill

## 2025-04-30 NOTE — TELEPHONE ENCOUNTER
Called and left a message on pt's answering machine for a call back    MK, are you agreeable to increase dosage or quantity?

## 2025-05-01 RX ORDER — KETOROLAC TROMETHAMINE 30 MG/ML
INJECTION, SOLUTION INTRAMUSCULAR; INTRAVENOUS
Qty: 4 ML | Refills: 6 | OUTPATIENT
Start: 2025-05-01

## 2025-05-02 NOTE — TELEPHONE ENCOUNTER
"Inbound call received from Patient to request Toradol refill. Patient was provided Mady Brannon''s advisement from other telephone encounter \"There is another encounter on increasing the Toradol, which I prefer not to do because her kidney function is low, last GFR 56.  Toradol can be toxic to the kidneys.  Is anything else that works for the patient that is not NSAID related?  How is Ubrelvy working?\"  Patient states she likes Ubrelvy but also wants to continue toradol. Patient also wants a Ubrelvy refill.     Patient also wanted to know if there are any oral surgeons at [facility] as her teeth are doing poorly, she thinks she has an infection, and she needs some removed. Patient thinks the teeth issues are contributing to migraines. Patient said she spoke with her PCP and they were not aware if there was any at [facility]. Patient referred to [email or web address] to type in her location and see if there are providers near her.     Medication:   ketorolac (TORADOL) 60 mg/2 mL     Dose/Frequency: INJECT 1 ML (30MG) AS NEEDED FOR MIGRAINE REPEAT AFTER 6 HOURS IF NEEDED MAX 2 INJECTIONS A WEEK     Quantity: 4 ml    Pharmacy:   [id number] - [place] [address] [address]  Phone: [phone]  Fax: [phone]       Office:   [] PCP/Provider -   [x] Speciality/Provider - Mady Brannon PA-C     Does the patient have enough for 3 days?   [] Yes   [x] No - Send as HP to POD  ----------------------------------------------  Medication: Ubrogepant (Ubrelvy) 100 MG tablet     Dose/Frequency: TAKE ONE TABLET BY MOUTH AT MIGRAINE ONSET REPEAT IN 2 HOURS AS NEEDED MAX 2 PER DAY     Quantity: 16    Pharmacy:   [id number] - [place] [address] [address]  Phone: [phone]  Fax: [phone]       Office:   [] PCP/Provider -   [x] Speciality/Provider - Mady Brannon PA-C     Does the patient have enough for 3 days?   [] " Yes   [x] No - Send as HP to POD     Mady Brannon PA-C please send scripts if agreeable, thank you!

## 2025-05-03 RX ORDER — KETOROLAC TROMETHAMINE 30 MG/ML
INJECTION, SOLUTION INTRAMUSCULAR; INTRAVENOUS
Qty: 4 ML | Refills: 1 | Status: SHIPPED | OUTPATIENT
Start: 2025-05-03

## 2025-05-13 ENCOUNTER — TELEPHONE (OUTPATIENT)
Age: 75
End: 2025-05-13

## 2025-05-13 NOTE — TELEPHONE ENCOUNTER
[facility]. Reached out to Anabell Rodriguez MA for assistance to reschedule.    Appoint July 1st at 1 pm with Mady Minor.

## 2025-05-25 NOTE — TELEPHONE ENCOUNTER
"Federal Correction Institution [facility], she was transferred to [facility] [facility] On review of work queue, found patient to have no scheduled/upcoming vyepti infusions.    Liza Bennett,   Hospitalist Service, [address]  Securely message with [employer] (

## 2025-05-30 DIAGNOSIS — G43.701 CHRONIC MIGRAINE WITHOUT AURA WITH STATUS MIGRAINOSUS, NOT INTRACTABLE: ICD-10-CM

## 2025-05-30 RX ORDER — RIMEGEPANT SULFATE 75 MG/75MG
TABLET, ORALLY DISINTEGRATING ORAL
Qty: 8 TABLET | Refills: 11 | Status: SHIPPED | OUTPATIENT
Start: 2025-05-30

## 2025-06-10 NOTE — PROGRESS NOTES
ADULT FOLLOW UP - VIDEO VISIT  PATIENT:  Camille Grayson    MEDICAL RECORD NUMBER:  [id number]    YOB: 1997    AGE: 27 year old    PRIMARY CARE PROVIDER:  Dinah Polanco MD    DATE:  6/11/2025   TIME:  8:00 AM    SOURCE OF INFORMATION:    I based this report upon my review of Camille Grayson's electronic medical record and my interview of Camille Grayson.       IDENTIFYING INFORMATION  Camille Grayson is a 27 year old female who is being seen today for medication management and follow up care.     This visit was performed via live two-way video with patient's verbal consent.   Clinician Location: Home  Patient Location: Parked car.  Camille is in [place] and identity has been established.   She was informed that consent to treat includes permission to submit charges to the applicable insurance on file. Camille was advised regarding the potential risk inherent in video visits, as the assessment may be limited due to what can be seen on the screen which potentially results in an incomplete assessment; as well as either of us may discontinue the video visit if it is.    CHIEF COMPLAINT:  Follow up on OCD and anxiety treatment    HPI:  Camille is a 27 year old female with diagnoses of generalized anxiety disorder, major depressive disorder, and OCD who presents for follow up med management appt. At initial visit she was continued on sertraline with an increase to 100mg daily and started on hydroxyzine 12.5-25mg three times daily as needed for breakthrough anxiety. Since the medication adjustment, the patient reports feeling better and more regulated. However, she continues to struggle with intrusive thoughts and obsessions, just with less severity as before.    The patient mentions she has been busier recently, which has provided distractions from her obsessions. She elaborates on her struggles with her intrusive thoughts, primarily revolving around second-guessing herself, questioning her morality,  Patient ID: Shirin Plata is a 71 y o  female  Assessment/Plan:    No problem-specific Assessment & Plan notes found for this encounter  {Assess/PlanSmartLinks:97197}       Subjective:    HPI    {[facility] HPI texts:76677}    {Common ambulatory SmartLinks:84274}         Objective:    Blood pressure 126/78, temperature (!) 96 9 °F (36 1 °C), temperature source Temporal     Physical Exam    Neurological Exam      ROS:    Review of Systems   Constitutional: Negative  Negative for appetite change and fever  HENT: Negative  Negative for hearing loss, tinnitus, trouble swallowing and voice change  Eyes: Negative  Negative for photophobia, pain and visual disturbance  Respiratory: Negative  Negative for shortness of breath  Cardiovascular: Negative  Negative for palpitations  Gastrointestinal: Negative  Negative for nausea and vomiting  Endocrine: Negative  Negative for cold intolerance  Genitourinary: Negative  Negative for dysuria, frequency and urgency  Musculoskeletal: Negative  Negative for gait problem, myalgias and neck pain  Skin: Negative  Negative for rash  Allergic/Immunologic: Negative  Neurological: Negative  Negative for dizziness, tremors, seizures, syncope, facial asymmetry, speech difficulty, weakness, light-headedness, numbness and headaches  Hematological: Negative  Does not bruise/bleed easily  Psychiatric/Behavioral: Negative  Negative for confusion, hallucinations and sleep disturbance  etc.     The patient has utilized hydroxyzine occasionally, particularly at night to assist in slowing down racing thoughts before bedtime.     She is scheduled for therapy in August and is on a cancellation waiting list and is open to dosage adjustment of sertraline to further improve symptoms of anxiety and OCD.     Current medications:  Current Outpatient Medications   Medication Sig    hydrOXYzine (ATARAX) 25 MG tablet Take 0.5-1 tablets by mouth 3 times daily as needed for Anxiety. Indications: Feeling Anxious    sertraline (ZOLOFT) 100 MG tablet Take 1 tablet by mouth daily. Indications: Obsessive Compulsive Disorder    triamcinolone (ARISTOCORT) 0.1 % ointment Apply 1 Application topically in the morning and 1 Application in the evening.    norgestimate-ethinyl estradiol (Estarylla) 0.25-35 MG-MCG per tablet Take 1 tablet by mouth daily.     No current facility-administered medications for this visit.       Pharmacologically, medications are well tolerated. Patient does not describe adverse reactions to current meds. Patient reports compliance with current medication..      Past/Family/Social History (PFSH):     Past Psychiatric History  Inpatient/PHP/IOP/Residential treatment history: none  Suicide/self harm history:  hx of self harm in high school for about 1-1.5 years, would self harm on her arms/thighs- using it as a release during periods of emotional numbness  Outpatient providers:              Therapist: no current therapist; last engaged in therapy for about 1 mo earlier this year. Therapist recommended she find someone who could be of more assistance (due to severity of OCD)  Previous Psychiatrist: none  Past psychiatric medications: fluoxetine, sertraline    Allergies  is allergic to amoxicillin.    Medical History  Past Medical History:   Diagnosis Date    Anxiety     Chronic low back pain 05/19/2016    Costochondritis 02/23/2022    Depression     Dyspepsia 02/23/2022    Localized scleroderma  (morphea) 05/06/2016    Overview:    Mom has recalled.      Biopsy was done maybe around age 8. At a [facility]      IMO 2018R1 Update  Formatting of this note might be different from the original.   Formatting of this note might be different from the original.   Mom has recalled.      Biopsy was done maybe around age 8. At a [facility]      IMO 2018R1 Update    Tendinitis of hip 05/19/2016        Surgical History  No past surgical history on file.    Past Family History  Mental illness: OCD in younger sister, mother with tendencies of OCD, father has OCD tendencies  Substance abuse: no  Family history of Suicide: no  Other pertinent family history (auto-generated) includes:   Family History   Problem Relation Age of Onset    Autoimmune Disease Mother       Social History  Childhood: very close with family, oldest of 4 children. Was a performer/creative; acting/musicals/singing  Highest level of education: Bachelor's Degree; did very well academically in high school and college. Sports, band, orchestra, choir, excellent grades, perfectionistic tendencies  Employment: working at [employer] in the [employer] department  Relationship: Engaged; getting  to her fiance Blayne, later this year  Children: none  Support system: good; family and her partner are supportive  Current/past legal issues: none reported    EXAM:  Brief review of systems:  Denies fever, rash or itching, ENT symptoms, chest pain, palpitations, or shortness of breath.  Denies nausea, vomiting, constipation, pain/burning during urination, enuresis, encopresis. Denies any dizziness, lightheadedness, syncope, seizures, abnormal muscle movements, muscular pain or other pain, tics, tremors, or HA. Denies gum bleeding or easy bruising.     Vital signs:   No vitals taken at this visit    Pertinent labs:   TSH, CBC, and Vit D levels obtained and reviewed with no concerns; will be scanned into chart      Mental Status Exam:  General appearance: Camille Grayson is a 27 year  old White female who appears their stated age, appropriately dressed and groomed, cooperative and in no acute physical distress, good eye contact  Attitude: Cooperative, Pleasant  Speech: fluent, normal rate & prosody  Mood: \"Okay\"  Affect: euthymic, anxious, normal range, congruent  Psychomotor: appears normal - difficult to completely assess via video  Gait/Station: unable to assess via video  Thought Process: linear, logical, and goal-directed   Thought Content: unremarkable  Associations: intact  Hallucinations: none  Suicidal thoughts: no  Homicidal thoughts: no  Level of Consciousness: alert  Orientation: oriented to person, place, time, and general circumstances  Attention/Concentration: Able to sustain attention and focus for interview without redirection  Fund of knowledge/ Intelligence: above average  Memory: no apparent impairments in short term memory and no apparent impairments in long term memory   Language: fluent, no dysarthria, no aphasia noted  Insight: good, as evidenced by patient's insight into own illness  Judgment: good, as evidenced by engagement in treatment      ASSESSMENT   Diagnosis(es)  Mixed obsessional thoughts and acts  (primary encounter diagnosis)  Generalized anxiety disorder    Impression and plan:  Tameka is a 27 year old female with psychiatric hx of OCD, DONI, and MDD who presents for follow-up with a chief complaint of ongoing anxiety and OCD symptoms. She reports improvement with her current medication regimen but continues to experience intrusive thoughts and obsessive-compulsive behaviors. Her mood has improved which she relates to support from her partner and decrease in severity of her intrusive thoughts. Despite these improvements, the patient is open to a dosage adjustment of sertraline due to the persistent nature of her symptoms and the >2 month wait for psychotherapy.     Her current medication adherence is good, and she has not reported any significant side effects  from the last increase in sertraline. The patient appears to be at a stable yet still challenging phase in managing her symptoms. No current thoughts of self-harm or harm to others are reported.     Medication(s):  Increase sertraline 100mg daily to 150mg daily  Continue hydroxyzine 12.5mg-25mg three times daily as needed for anxiety    Recommend aiming for a total dosage of vit d3 2000 IU (over the counter) due to borderline low vitamin D level    Lab, Procedure, Consult, Referral Recommendations:  Establish with psychotherapy; on a wait list      Education:  Symptoms discussed and validated, support and psycho-education provided. Discussed diagnosis, recommended treatment, alternative treatment options, and the right to refuse treatment. There have not been any new medication(s) prescribed today.    Follow up with me in 1 month. Encouraged to contact the clinic with questions/comments/concerns or to schedule an earlier appointment with me.    Prep-Time, Appointment Duration, And Post-Appointment Documentation Time: 20 minutes.    CAT Doherty       The medical opinions expressed in this report are based only on information available at this time and are subject to change if any additional information becomes available.     This information is confidential and disclosure without patient consent or statutory authorization is prohibited by law.

## 2025-06-13 ENCOUNTER — TELEPHONE (OUTPATIENT)
Age: 75
End: 2025-06-13

## 2025-06-13 NOTE — TELEPHONE ENCOUNTER
Patient called to inform Manuela PA-C that she was recently diagnosed with Skin Cancer (by Dr Altamirano) on the top of her head, size of a quarter.    Pt is seen by Dr Altamirano ([facility]) whom approved that she can have Botox for her migraines even     Pt requesting a sooner botox appt than 7/1/25, as pt expressed she was due in May, but had to r/s, so now she is behind and having HA (that are being managed at this time) due to be late.    Pt aware I will route msg to Provider to get approval and MA, to send pt a my chart msg if there is any sooner date either way. Pt does not expect a msg update until mid next week.

## 2025-06-17 NOTE — TELEPHONE ENCOUNTER
Left second msg that [facility] has a 1130 and 12 appt open today 6/17 if she wanted to move up her botox appt. If not she will need to keep her 7/1 appt.

## 2025-06-23 ENCOUNTER — ANESTHESIA EVENT (OUTPATIENT)
Dept: PERIOP | Facility: HOSPITAL | Age: 75
End: 2025-06-23
Payer: MEDICARE

## 2025-06-23 NOTE — ANESTHESIA PREPROCEDURE EVALUATION
Procedure:  EXCISION VERTEX SCALP LESION, FROZEN SECTION, SKIN GRAFT REPAIR (Head)    ECG: NSR    HTN - losartan  Relevant Problems   CARDIO   (+) Chronic migraine without aura with status migrainosus, not intractable   (+) Hypertension   (+) Intractable chronic migraine without aura and without status migrainosus   (+) Intractable migraine without aura      /RENAL   (+) Chronic kidney disease      NEURO/PSYCH   (+) Anxiety and depression   (+) Chronic migraine without aura with status migrainosus, not intractable   (+) Intractable chronic migraine without aura and without status migrainosus   (+) Intractable migraine without aura   (+) Recurrent major depressive disorder, in partial remission (HCC)        Physical Exam    Airway     Mallampati score: II  TM Distance: >3 FB  Neck ROM: full  Mouth opening: >= 4 cm      Cardiovascular  Cardiovascular exam normal    Dental   No notable dental hx     Pulmonary  Pulmonary exam normal     Neurological  - normal exam  She appears oriented x3.      Other Findings  post-pubertal.      Anesthesia Plan  ASA Score- 2     Anesthesia Type- general with ASA Monitors.         Additional Monitors:     Airway Plan: LMA, Oral ETT and LMA.           Plan Factors-Exercise tolerance (METS): >4 METS.    Chart reviewed. EKG reviewed.  Existing labs reviewed. Patient summary reviewed.    Patient is not a current smoker. Patient not instructed to abstain from smoking on day of procedure. Patient did not smoke on day of surgery.            Induction-     Postoperative Plan- Plan for postoperative opioid use.   Monitoring Plan - Monitoring plan - standard ASA monitoring  Post Operative Pain Plan - plan for postoperative opioid use    Perioperative Resuscitation Plan - Level 1 - Full Code.       Informed Consent- Anesthetic plan and risks discussed with patient.  I personally reviewed this patient with the CRNA. Discussed and agreed on the Anesthesia Plan with the CRNA..      NPO Status:  No  "vitals data found for the desired time range.      No results found for: \"HGBA1C\"    Lab Results   Component Value Date     (L) 04/21/2014    K 4.1 06/24/2025     06/24/2025    CO2 26 06/24/2025    ANIONGAP 3 (L) 04/21/2014    BUN 28 (H) 06/24/2025    CREATININE 1.1 06/24/2025    GLUCOSE 93 04/21/2014    CALCIUM 8.7 06/24/2025    AST 24 12/17/2024    ALT 9 12/17/2024    ALKPHOS 70 12/17/2024    PROT 7.1 04/21/2014    BILITOT 0.4 04/21/2014    EGFR 53 (L) 06/24/2025       Lab Results   Component Value Date    WBC 12.60 (H) 05/02/2023    HGB 13.6 05/02/2023    HCT 41.9 05/02/2023    MCV 97 05/02/2023     05/02/2023     EKG NSR   "

## 2025-06-25 ENCOUNTER — HOSPITAL ENCOUNTER (OUTPATIENT)
Facility: HOSPITAL | Age: 75
Setting detail: OUTPATIENT SURGERY
Discharge: HOME | End: 2025-06-25
Attending: PLASTIC SURGERY | Admitting: PLASTIC SURGERY
Payer: MEDICARE

## 2025-06-25 ENCOUNTER — ANESTHESIA (OUTPATIENT)
Dept: PERIOP | Facility: HOSPITAL | Age: 75
End: 2025-06-25
Payer: MEDICARE

## 2025-06-25 VITALS
BODY MASS INDEX: 23.32 KG/M2 | WEIGHT: 140 LBS | TEMPERATURE: 97 F | OXYGEN SATURATION: 96 % | RESPIRATION RATE: 16 BRPM | HEART RATE: 75 BPM | HEIGHT: 65 IN | DIASTOLIC BLOOD PRESSURE: 93 MMHG | SYSTOLIC BLOOD PRESSURE: 180 MMHG

## 2025-06-25 DIAGNOSIS — Z13.9 ENCOUNTER FOR SCREENING INVOLVING SOCIAL DETERMINANTS OF HEALTH (SDOH): ICD-10-CM

## 2025-06-25 DIAGNOSIS — C44.42 SQUAMOUS CELL CARCINOMA OF SKIN OF SCALP AND NECK: ICD-10-CM

## 2025-06-25 PROBLEM — N18.9 CHRONIC KIDNEY DISEASE: Status: ACTIVE | Noted: 2025-06-25

## 2025-06-25 PROCEDURE — 88332 PATH CONSLTJ SURG EA ADD BLK: CPT | Performed by: PATHOLOGY

## 2025-06-25 PROCEDURE — 88305 TISSUE EXAM BY PATHOLOGIST: CPT | Performed by: PATHOLOGY

## 2025-06-25 PROCEDURE — 88331 PATH CONSLTJ SURG 1 BLK 1SPC: CPT | Performed by: PATHOLOGY

## 2025-06-25 RX ORDER — FENTANYL CITRATE 50 UG/ML
INJECTION, SOLUTION INTRAMUSCULAR; INTRAVENOUS AS NEEDED
Status: DISCONTINUED | OUTPATIENT
Start: 2025-06-25 | End: 2025-06-25

## 2025-06-25 RX ORDER — ONDANSETRON 4 MG/1
4 TABLET, ORALLY DISINTEGRATING ORAL EVERY 6 HOURS PRN
Status: DISCONTINUED | OUTPATIENT
Start: 2025-06-25 | End: 2025-06-25 | Stop reason: HOSPADM

## 2025-06-25 RX ORDER — BUPIVACAINE HYDROCHLORIDE AND EPINEPHRINE 2.5; 5 MG/ML; UG/ML
INJECTION, SOLUTION EPIDURAL; INFILTRATION; INTRACAUDAL; PERINEURAL AS NEEDED
Status: DISCONTINUED | OUTPATIENT
Start: 2025-06-25 | End: 2025-06-25 | Stop reason: HOSPADM

## 2025-06-25 RX ORDER — ONDANSETRON 2 MG/ML
4 INJECTION INTRAMUSCULAR; INTRAVENOUS ONCE AS NEEDED
Status: DISCONTINUED | OUTPATIENT
Start: 2025-06-25 | End: 2025-06-25 | Stop reason: HOSPADM

## 2025-06-25 RX ORDER — ACETAMINOPHEN 325 MG/1
325 TABLET ORAL EVERY 6 HOURS PRN
Status: DISCONTINUED | OUTPATIENT
Start: 2025-06-25 | End: 2025-06-25 | Stop reason: HOSPADM

## 2025-06-25 RX ORDER — FENTANYL CITRATE/PF 50 MCG/ML
25 SYRINGE (ML) INJECTION
Status: DISCONTINUED | OUTPATIENT
Start: 2025-06-25 | End: 2025-06-25 | Stop reason: HOSPADM

## 2025-06-25 RX ORDER — SODIUM CHLORIDE, SODIUM LACTATE, POTASSIUM CHLORIDE, CALCIUM CHLORIDE 600; 310; 30; 20 MG/100ML; MG/100ML; MG/100ML; MG/100ML
INJECTION, SOLUTION INTRAVENOUS CONTINUOUS PRN
Status: DISCONTINUED | OUTPATIENT
Start: 2025-06-25 | End: 2025-06-25

## 2025-06-25 RX ORDER — ONDANSETRON 2 MG/ML
INJECTION INTRAMUSCULAR; INTRAVENOUS AS NEEDED
Status: DISCONTINUED | OUTPATIENT
Start: 2025-06-25 | End: 2025-06-25

## 2025-06-25 RX ORDER — LIDOCAINE HYDROCHLORIDE 10 MG/ML
INJECTION, SOLUTION EPIDURAL; INFILTRATION; INTRACAUDAL; PERINEURAL AS NEEDED
Status: DISCONTINUED | OUTPATIENT
Start: 2025-06-25 | End: 2025-06-25

## 2025-06-25 RX ORDER — MAGNESIUM HYDROXIDE 1200 MG/15ML
LIQUID ORAL AS NEEDED
Status: DISCONTINUED | OUTPATIENT
Start: 2025-06-25 | End: 2025-06-25 | Stop reason: HOSPADM

## 2025-06-25 RX ORDER — SODIUM CHLORIDE, SODIUM LACTATE, POTASSIUM CHLORIDE, CALCIUM CHLORIDE 600; 310; 30; 20 MG/100ML; MG/100ML; MG/100ML; MG/100ML
50 INJECTION, SOLUTION INTRAVENOUS CONTINUOUS
Status: DISCONTINUED | OUTPATIENT
Start: 2025-06-25 | End: 2025-06-25 | Stop reason: HOSPADM

## 2025-06-25 RX ORDER — PROPOFOL 10 MG/ML
INJECTION, EMULSION INTRAVENOUS AS NEEDED
Status: DISCONTINUED | OUTPATIENT
Start: 2025-06-25 | End: 2025-06-25

## 2025-06-25 RX ADMIN — ONDANSETRON 4 MG: 2 INJECTION INTRAMUSCULAR; INTRAVENOUS at 09:51

## 2025-06-25 RX ADMIN — FENTANYL CITRATE 25 MCG: 50 INJECTION INTRAMUSCULAR; INTRAVENOUS at 10:08

## 2025-06-25 RX ADMIN — FENTANYL CITRATE 25 MCG: 50 INJECTION, SOLUTION INTRAMUSCULAR; INTRAVENOUS at 09:16

## 2025-06-25 RX ADMIN — PROPOFOL 200 MG: 10 INJECTION, EMULSION INTRAVENOUS at 08:46

## 2025-06-25 RX ADMIN — FENTANYL CITRATE 25 MCG: 50 INJECTION, SOLUTION INTRAMUSCULAR; INTRAVENOUS at 09:50

## 2025-06-25 RX ADMIN — ACETAMINOPHEN 325 MG: 325 TABLET ORAL at 11:45

## 2025-06-25 RX ADMIN — FENTANYL CITRATE 25 MCG: 50 INJECTION, SOLUTION INTRAMUSCULAR; INTRAVENOUS at 08:52

## 2025-06-25 RX ADMIN — SODIUM CHLORIDE, SODIUM LACTATE, POTASSIUM CHLORIDE, AND CALCIUM CHLORIDE: .6; .31; .03; .02 INJECTION, SOLUTION INTRAVENOUS at 08:41

## 2025-06-25 RX ADMIN — LIDOCAINE HYDROCHLORIDE 50 MG: 10 INJECTION, SOLUTION EPIDURAL; INFILTRATION; INTRACAUDAL at 08:46

## 2025-06-25 RX ADMIN — FENTANYL CITRATE 25 MCG: 50 INJECTION INTRAMUSCULAR; INTRAVENOUS at 10:20

## 2025-06-25 RX ADMIN — FENTANYL CITRATE 25 MCG: 50 INJECTION, SOLUTION INTRAMUSCULAR; INTRAVENOUS at 08:54

## 2025-06-25 NOTE — INTERVAL H&P NOTE
H&P reviewed. After examining the patient I find no changes in the patients condition since the H&P had been written.    Vitals:    06/25/25 0732   BP: 124/59   Pulse: 62   Resp: 18   Temp: (!) 96.7 °F (35.9 °C)   SpO2: 96%

## 2025-06-25 NOTE — ANESTHESIA POSTPROCEDURE EVALUATION
Post-Op Assessment Note    Last Filed PACU Vitals:  Vitals Value Taken Time   Temp     Pulse 79 06/25/25 10:02   /73    Resp 11    SpO2 100 % 06/25/25 10:02   Vitals shown include unfiled device data.

## 2025-06-25 NOTE — ANESTHESIA POSTPROCEDURE EVALUATION
Post-Op Assessment Note    CV Status:  Stable  Pain Score: 1    Pain management: adequate       Mental Status:  Alert and awake   Hydration Status:  Euvolemic   PONV Controlled:  Controlled   Airway Patency:  Patent     Post Op Vitals Reviewed: Yes    No anethesia notable event occurred.            Last Filed PACU Vitals:  Vitals Value Taken Time   Temp 98.7 °F (37.1 °C) 06/25/25 10:00   Pulse 72 06/25/25 10:35   /76 06/25/25 10:30   Resp 16 06/25/25 10:30   SpO2 98 % 06/25/25 10:34   Vitals shown include unfiled device data.    Modified Melanie:     Vitals Value Taken Time   Activity 2 06/25/25 10:30   Respiration 2 06/25/25 10:30   Circulation 2 06/25/25 10:30   Consciousness 2 06/25/25 10:30   Oxygen Saturation 2 06/25/25 10:30     Modified Melanie Score: 10             98825 (Hospital discharge day management; 30 min or less)

## 2025-06-25 NOTE — OP NOTE
OPERATIVE REPORT  PATIENT NAME: Jovita Bauman    :  1950  MRN: [id number]  Pt Location:  OR ROOM 08    SURGERY DATE: 2025    Surgeons and Role:     * Lavon Altamirano MD - Primary    Preop and postoperative diagnosis: Mid vertex scalp lesion    Procedure(s):  EXCISION VERTEX SCALP LESION. FROZEN SECTION. 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  Since the biopsy of this lesion is virtually doubled in size.  And now measures for 25 mm in diameter.  It was removed taking 5 mm margins in a circular fashion with a suture placed at the 12:00 or anterior margin.  The depth of excision was just below the subcutaneous tissues leaving a layer of vascularized tissue on the galea underneath.  Frozen section examination revealed that all tumor was removed.  Therefore a skin graft of circular diameter 35 mm was placed on the defect so created for closure.    Operative procedure: The patient was taken placed supine on the operating table.  She was prepped and draped in usual fashion.  Anesthesia was general via laryngal mask anesthesia.  Marcaine quarter percent with epinephrine was infiltrated around the marked area of excision of the lesion of the mid vertex of the scalp as well as on the donor site of the medial right anterior thigh.  The lesion that was then incised and with scissor dissection removed as described above hemostasis in the area was achieved with the Bovie the specimen was sent for frozen section examination.    A Dillan air driven dermatome was used to harvest a 14 1007 and split-thickness skin graft from the medial aspect of the right anterior thigh.  The graft was meshed 1-1/2-1.  On hearing that the frozen section  examination showed no residual tumor the graft was placed on the scalp defect using staples for fixation.  A bulky dressing much like a stent was sewn over this area being held in place with 2-0 silk interrupted simple sutures.  The extra skin was returned to the donor site of the right thigh were also was stapled into position with the intent to hasten healing of that area.  Light dressings were placed on the right thigh.  The patient tolerated this procedure well and was taken to the recovery area in good condition.      SIGNATURE: Lavon Altamirano MD  DATE: June 25, 2025  TIME: 10:11 AM                 None

## 2025-07-01 ENCOUNTER — PROCEDURE VISIT (OUTPATIENT)
Dept: NEUROLOGY | Facility: CLINIC | Age: 75
End: 2025-07-01
Payer: MEDICARE

## 2025-07-01 VITALS — TEMPERATURE: 97.8 F | HEART RATE: 66 BPM | SYSTOLIC BLOOD PRESSURE: 170 MMHG | DIASTOLIC BLOOD PRESSURE: 92 MMHG

## 2025-07-01 DIAGNOSIS — R94.4 DECREASED GFR: ICD-10-CM

## 2025-07-01 DIAGNOSIS — G43.719 INTRACTABLE CHRONIC MIGRAINE WITHOUT AURA AND WITHOUT STATUS MIGRAINOSUS: ICD-10-CM

## 2025-07-01 DIAGNOSIS — G43.701 CHRONIC MIGRAINE WITHOUT AURA WITH STATUS MIGRAINOSUS, NOT INTRACTABLE: Primary | ICD-10-CM

## 2025-07-01 DIAGNOSIS — E53.8 B12 DEFICIENCY: ICD-10-CM

## 2025-07-01 PROCEDURE — 88305 TISSUE EXAM BY PATHOLOGIST: CPT | Performed by: PATHOLOGY

## 2025-07-01 PROCEDURE — 64615 CHEMODENERV MUSC MIGRAINE: CPT | Performed by: PHYSICIAN ASSISTANT

## 2025-07-01 RX ORDER — PROCHLORPERAZINE MALEATE 10 MG
10 TABLET ORAL EVERY 6 HOURS PRN
Qty: 30 TABLET | Refills: 2 | Status: SHIPPED | OUTPATIENT
Start: 2025-07-01

## 2025-07-01 RX ORDER — KETOROLAC TROMETHAMINE 30 MG/ML
INJECTION, SOLUTION INTRAMUSCULAR; INTRAVENOUS
Qty: 10 ML | Refills: 0 | Status: SHIPPED | OUTPATIENT
Start: 2025-07-01

## 2025-07-01 RX ORDER — RIZATRIPTAN BENZOATE 10 MG/1
TABLET, ORALLY DISINTEGRATING ORAL
Qty: 12 TABLET | Refills: 5 | Status: SHIPPED | OUTPATIENT
Start: 2025-07-01

## 2025-07-01 RX ORDER — SYRINGE W-NEEDLE,DISPOSAB,3 ML 25GX5/8"
SYRINGE, EMPTY DISPOSABLE MISCELLANEOUS
Qty: 30 EACH | Refills: 0 | Status: SHIPPED | OUTPATIENT
Start: 2025-07-01

## 2025-07-01 NOTE — PROGRESS NOTES
Universal Protocol   Consent: Verbal consent obtained. Written consent obtained  Risks and benefits: risks, benefits and alternatives were discussed  Consent given by: patient  Patient understanding: patient states understanding of the procedure being performed  Patient consent: the patient's understanding of the procedure matches consent given  Procedure consent: procedure consent matches procedure scheduled      Chemodenervation     Date/Time  7/1/2025 1:00 PM     Performed by  Mady Brannon PA-C   Authorized by  Mady Brannon PA-C     Pre-procedure details      Prepped With: Alcohol     Procedure details      Position:  Upright   Botox      Botox Type:  Type A    Brand:  Botox    mL's of Botulinum Toxin:  200    Final Concentration per CC:  100 units    Needle Gauge:  30 G 2.5 inch   Procedures      Botox Procedures: chronic headache      Indications: migraines     Injection Location      Head / Face:  L superior trapezius, R superior trapezius, L superior cervical paraspinal, R superior cervical paraspinal, L , R , procerus, L temporalis, R temporalis, R frontalis, L frontalis, R medial occipitalis and L medial occipitalis    L  injection amount:  5 unit(s)    R  injection amount:  5 unit(s)    L lateral frontalis:  5 unit(s)    R lateral frontalis:  5 unit(s)    L medial frontalis:  5 unit(s)    R medial frontalis:  5 unit(s)    L temporalis injection amount:  20 unit(s)    R temporalis injection amount:  20 unit(s)    Procerus injection amount:  5 unit(s)    L medial occipitalis injection amount:  15 unit(s)    R medial occipitalis injection amount:  15 unit(s)    L superior cervical paraspinal injection amount:  10 unit(s)    R superior cervical paraspinal injection amount:  10 unit(s)    L superior trapezius injection amount:  15 unit(s)    R superior trapezius injection amount:  15 unit(s)   Total Units      Total units used:  200    Total units discarded:  0    Post-procedure details      Chemodenervation:  Chronic migraine    Facial Nerve Location::  Bilateral facial nerve    Patient tolerance of procedure:  Tolerated well, no immediate complications   Comments       Extra medically necessary:  - 20 R occipitalis (lateral)  - 15 between the right and left suboccipital regions, lateral aspects  - 10 right lateral orbicularis oculi       Blood pressure 170/92, pulse 66, temperature 97.8 °F (36.6 °C), temperature source Temporal.

## 2025-07-09 ENCOUNTER — NURSE TRIAGE (OUTPATIENT)
Age: 75
End: 2025-07-09

## 2025-07-09 DIAGNOSIS — G43.701 CHRONIC MIGRAINE WITHOUT AURA WITH STATUS MIGRAINOSUS, NOT INTRACTABLE: Primary | ICD-10-CM

## 2025-07-09 RX ORDER — SUMATRIPTAN 20 MG/1
SPRAY NASAL
Qty: 24 EACH | Refills: 0 | Status: SHIPPED | OUTPATIENT
Start: 2025-07-09

## 2025-07-09 RX ORDER — SODIUM CHLORIDE 9 MG/ML
20 INJECTION, SOLUTION INTRAVENOUS ONCE
OUTPATIENT
Start: 2025-07-09

## 2025-07-09 NOTE — TELEPHONE ENCOUNTER
"REASON FOR CONVERSATION: Infusion     SYMPTOMS: Patient said she has had a lingering migraine and is now interested in \"infusions\" mentioned. Patient states she also did not get a refill on her triptan nasal spray and would like it refilled.     OTHER HEALTH INFORMATION: Patient's last procedure visit was 07/01/2025.     PROTOCOL DISPOSITION: Discuss with Provider and Call Back Patient (overriding Discuss With PCP and Callback by Nurse Within 1 Hour)    CARE ADVICE PROVIDED: CALL BACK IF: * You have any more questions * You become worse    PRACTICE FOLLOW-UP: Rosalba ARAYA Please advise, thank you!    Reason for Disposition   Caller has URGENT medicine question about med that PCP or specialist prescribed and triager unable to answer question    Answer Assessment - Initial Assessment Questions  1. NAME of MEDICINE: \"What medicine(s) are you calling about?\"      \"Infusions\"    2. QUESTION: \"What is your question?\" (e.g., double dose of medicine, side effect)      Patient said she has been having a lingering migraine since she saw Rosalba ARAYA last 07/01/2025. Patient said at that appointment they had discussed \"infusions\" but the Patient had wanted to wait at that time. Caridadtent states she is now willing to try infusions.     3. PRESCRIBER: \"Who prescribed the medicine?\" Reason: if prescribed by specialist, call should be referred to that group.      Rosalba ARAAY    4. SYMPTOMS: \"Do you have any symptoms?\" If Yes, ask: \"What symptoms are you having?\"  \"How bad are the symptoms (e.g., mild, moderate, severe)      Patient states she is to the point where she wants to do something more. Patient mentioned she is not sure if the heat is making it worse. Caridadtent also mentioned she recently had an operation to \"remove cancer from her head.\" Patient did not want to have CTS triage the migraine (how it feels, location.) Patient states the provider \"has that information.\"    Protocols used: Medication " Question Call-Adult-[place]

## 2025-07-09 NOTE — TELEPHONE ENCOUNTER
[employer] enrollment initiated. Portal message sent to patient for infusion site preference.    Will follow up.

## 2025-07-11 ENCOUNTER — TELEPHONE (OUTPATIENT)
Dept: NEUROLOGY | Facility: CLINIC | Age: 75
End: 2025-07-11

## 2025-07-11 NOTE — TELEPHONE ENCOUNTER
Received via [employer] request for prior auth for sumatriptan.  Request scanned into media manager.

## 2025-07-14 NOTE — TELEPHONE ENCOUNTER
PA for SUMAtriptan (IMITREX) 20 MG/ACT nasal spray SUBMITTED to [facility]     via    []CMM-KEY:   [x]Surescripts-Case ID # [id number]   []Availity-Auth ID # NDC #   []Faxed to plan   []Other website   []Phone call Case ID #     []PA sent as URGENT    All office notes, labs and other pertaining documents and studies sent. Clinical questions answered. Awaiting determination from insurance company.     Turnaround time for your insurance to make a decision on your Prior Authorization can take 7-21 business days.

## 2025-07-17 NOTE — TELEPHONE ENCOUNTER
"Anesthesia Transfer of Care Note    Patient: Munira Schafer    Procedure(s) Performed: Procedure(s) (LRB):  COLONOSCOPY (N/A)    Patient location: PACU    Anesthesia Type: general    Transport from OR: Transported from OR on room air with adequate spontaneous ventilation    Post pain: adequate analgesia    Post assessment: no apparent anesthetic complications    Post vital signs: stable    Level of consciousness: awake    Nausea/Vomiting: no nausea/vomiting    Complications: none    Transfer of care protocol was followed      Last vitals:   Visit Vitals  /60 (BP Location: Left arm, Patient Position: Sitting)   Pulse 62   Temp 36.1 °C (97 °F) (Temporal)   Resp 15   Ht 5' 1.5" (1.562 m)   Wt 79.3 kg (174 lb 13.2 oz)   SpO2 100%   Breastfeeding No   BMI 32.50 kg/m²     " Received via [employer] from [employer] Summary of Benefits.  Summary scanned into .

## 2025-07-17 NOTE — TELEPHONE ENCOUNTER
Called [employer] [phone], spoke with [employer] regarding benefits investigation. [employer] faxed determination on 7/16/25 to the office. Per [employer], [employer] unable to verify as information from [facility] would not be given; only to patient or providers office    Investigation was not run for Medicare A & B - [employer] will run. Provided patient's Medicare ID#.    Will follow up.

## 2025-08-20 ENCOUNTER — TELEPHONE (OUTPATIENT)
Dept: NEUROLOGY | Facility: CLINIC | Age: 75
End: 2025-08-20

## (undated) DEVICE — NEEDLE BLUNT 18 G X 1 1/2IN

## (undated) DEVICE — DERMATOME BLADES: Brand: DERMATOME

## (undated) DEVICE — LIGHT GLOVE GREEN

## (undated) DEVICE — SYRINGE 30ML LL

## (undated) DEVICE — SYRINGE 10ML LL CONTROL TOP

## (undated) DEVICE — TIBURON SPLIT SHEET: Brand: CONVERTORS

## (undated) DEVICE — STAPLER SKIN MULTIFIRE PREMIUM 35

## (undated) DEVICE — NEEDLE 25G X 1 1/2

## (undated) DEVICE — STERILE POLYISOPRENE POWDER-FREE SURGICAL GLOVES WITH EMOLLIENT COATING: Brand: PROTEXIS

## (undated) DEVICE — DISPOSABLE OR TOWEL: Brand: CARDINAL HEALTH

## (undated) DEVICE — GAUZE SPONGES,16 PLY: Brand: CURITY

## (undated) DEVICE — STERILE POLYISOPRENE POWDER-FREE SURGICAL GLOVES: Brand: PROTEXIS

## (undated) DEVICE — CABLE BIPOLAR DISP MEGADYNE

## (undated) DEVICE — SCD SEQUENTIAL COMPRESSION COMFORT SLEEVE MEDIUM KNEE LENGTH: Brand: KENDALL SCD

## (undated) DEVICE — LAMINECTOMY ARM CRADLE FOAM POSITIONER: Brand: CARDINAL HEALTH

## (undated) DEVICE — SOLUTION BOWL: Brand: KENDALL

## (undated) DEVICE — INTENDED FOR TISSUE SEPARATION, AND OTHER PROCEDURES THAT REQUIRE A SHARP SURGICAL BLADE TO PUNCTURE OR CUT.: Brand: BARD-PARKER ® SAFETYLOCK CARBON RIB-BACK BLADES

## (undated) DEVICE — UNDERGLOVE PROTEXIS  BLUE SZ 7

## (undated) DEVICE — GLOVE INDICATOR PI UNDERGLOVE SZ 6.5 BLUE

## (undated) DEVICE — SPONGE 4 X 4 XRAY 16 PLY STRL LF RFD

## (undated) DEVICE — REM POLYHESIVE ADULT PATIENT RETURN ELECTRODE: Brand: VALLEYLAB

## (undated) DEVICE — SKIN MARKER DUAL TIP WITH RULER CAP, FLEXIBLE RULER AND LABELS: Brand: DEVON

## (undated) DEVICE — STANDARD SURGICAL GOWN, L: Brand: CONVERTORS

## (undated) DEVICE — CRADLE EXTREMITY UNIVERSAL CONTOURED

## (undated) DEVICE — 1.5 TO 1 DERMACARRIER: Brand: MESHGRAFTTM  II TISSUE EXPANSION SYSTEM

## (undated) DEVICE — 1820 FOAM BLOCK NEEDLE COUNTER: Brand: DEVON

## (undated) DEVICE — BRUSH EZ SCRUB PCMX W/NAIL CLEANER

## (undated) DEVICE — INSULATED NEEDLE ELECTRODE: Brand: EDGE

## (undated) DEVICE — ASTOUND STANDARD SURGICAL GOWN, XL: Brand: CONVERTORS

## (undated) DEVICE — USED IN CONJUNCTION WITH A SYRINGE AS AN ADDITIVE DEVICE FOR ASPIRATION FROM MULTI-DOSE MEDICINE VIALS OR INJECTION INTO I.V. SYSTEMS AND PRE-SLIT SEPTUMS COVERING INJECTION SITES.: Brand: SOL-M™ BLUNT FILL NEEDLE

## (undated) DEVICE — NEPTUNE E-SEP SMOKE EVACUATION PENCIL, COATED, 70MM BLADE, PUSH BUTTON SWITCH: Brand: NEPTUNE E-SEP

## (undated) DEVICE — COTTON TIP APPLICTOR 2 PK

## (undated) DEVICE — BETHLEHEM UNIVERSAL MINOR GEN: Brand: CARDINAL HEALTH

## (undated) DEVICE — SINGLE PORT MANIFOLD: Brand: NEPTUNE 2

## (undated) DEVICE — SUT ETHILON 6-0 P-3 18 IN 1698G

## (undated) DEVICE — ICE PACK EYE

## (undated) DEVICE — TUBING SUCTION 5MM X 12 FT

## (undated) DEVICE — BASIC PACK: Brand: CONVERTORS

## (undated) DEVICE — GLOVE PI ULTRA TOUCH SZ.6.5